# Patient Record
Sex: MALE | Race: WHITE | NOT HISPANIC OR LATINO | Employment: OTHER | URBAN - NONMETROPOLITAN AREA
[De-identification: names, ages, dates, MRNs, and addresses within clinical notes are randomized per-mention and may not be internally consistent; named-entity substitution may affect disease eponyms.]

---

## 2017-02-28 ENCOUNTER — TELEPHONE (OUTPATIENT)
Dept: CARDIOLOGY | Facility: CLINIC | Age: 72
End: 2017-02-28

## 2017-02-28 ENCOUNTER — OFFICE VISIT (OUTPATIENT)
Dept: CARDIOLOGY | Facility: CLINIC | Age: 72
End: 2017-02-28

## 2017-02-28 VITALS
BODY MASS INDEX: 31.35 KG/M2 | SYSTOLIC BLOOD PRESSURE: 120 MMHG | WEIGHT: 219 LBS | HEART RATE: 64 BPM | DIASTOLIC BLOOD PRESSURE: 68 MMHG | HEIGHT: 70 IN

## 2017-02-28 DIAGNOSIS — I10 ESSENTIAL HYPERTENSION: Primary | ICD-10-CM

## 2017-02-28 DIAGNOSIS — I25.10 CORONARY ARTERY DISEASE INVOLVING NATIVE CORONARY ARTERY OF NATIVE HEART WITHOUT ANGINA PECTORIS: Primary | ICD-10-CM

## 2017-02-28 DIAGNOSIS — E78.49 OTHER HYPERLIPIDEMIA: ICD-10-CM

## 2017-02-28 DIAGNOSIS — E03.8 OTHER SPECIFIED HYPOTHYROIDISM: ICD-10-CM

## 2017-02-28 DIAGNOSIS — I10 ESSENTIAL HYPERTENSION: ICD-10-CM

## 2017-02-28 DIAGNOSIS — I73.9 CLAUDICATION (HCC): ICD-10-CM

## 2017-02-28 PROBLEM — E03.9 HYPOTHYROID: Status: ACTIVE | Noted: 2017-02-28

## 2017-02-28 PROBLEM — E78.5 HYPERLIPEMIA: Status: ACTIVE | Noted: 2017-02-28

## 2017-02-28 PROCEDURE — 99214 OFFICE O/P EST MOD 30 MIN: CPT | Performed by: NURSE PRACTITIONER

## 2017-02-28 RX ORDER — ROSUVASTATIN CALCIUM 10 MG/1
10 TABLET, COATED ORAL DAILY
Qty: 90 TABLET | Refills: 2 | Status: SHIPPED | OUTPATIENT
Start: 2017-02-28 | End: 2017-08-31 | Stop reason: SDUPTHER

## 2017-02-28 RX ORDER — ROSUVASTATIN CALCIUM 10 MG/1
10 TABLET, COATED ORAL DAILY
Qty: 30 TABLET | Refills: 8 | Status: SHIPPED | OUTPATIENT
Start: 2017-02-28 | End: 2017-02-28 | Stop reason: SDUPTHER

## 2017-02-28 RX ORDER — ESOMEPRAZOLE MAGNESIUM 40 MG/1
40 CAPSULE, DELAYED RELEASE ORAL
COMMUNITY
End: 2017-08-31

## 2017-02-28 NOTE — PROGRESS NOTES
Chief Complaint   Patient presents with   • Follow-up     He reports has some sharp pain to right upper chest that comes and goes. He states has dizziness. Had labs obtained today.   • Shortness of Breath     He states all the time. He reports that he does not have PCP at this time.   • Med Refill     Needs 90 day refill on Rosvastatin.       Cardiac Complaints  chest pressure/discomfort, claudication and dyspnea      Subjective   Steve Santoyo is a 71 y.o. male diagnosed with ischemic heart disease in 2001. He underwent bypass surgery in 2003 and stenting in 2014. In the beginning of 2016, he started having some chest pain and dizziness. Cardiac workup showed ischemia involving the inferolateral wall and medication changes were made and the patient felt much better.  He returns today for follow up and reports he has chest discomfort that is improved but does still have some pain in the right side of his chest that comes and goes, he does not have to take any NTG and it is unrelated to exertion, denies left sided pain.  He also reports that he has shortness of breath but this is no worse than prior. Dizziness is present according to patient at times but he reports this as same as prior.  Lab order given today with more recommendations to follow.  Refills of crestor requested.        Cardiac History  Past Surgical History   Procedure Laterality Date   • Coronary artery bypass graft  12/18/2003     LIMA-LAD, SVG-OM.   • Cardiovascular stress test  09/20/2007     7 min, 70% THR, negative   • Echo - converted  09/20/2007     EF >60%, Mild to mod MR.   • Cardiovascular stress test  12/01/2008     7 min, 30 sec., 68% THR. R/O lateral ischemia   • Echo - converted  12/01/2008     EF>60%, Lateral WMA. RVSP- 42mmHg   • Cardiovascular stress test  04/12/2010     S. Echo- 7min, 30sec, 79% THR. Negative   • Us carotid unilateral  04/14/2011     no sig stenosis   • Other surgical history  04/14/2011     CT head- negative   •  Echo - converted  04/13/2012     EF 60-65%, aortic root 4.1cm   • Cardiovascular stress test  04/13/2012     L. Myoview- small apical ischemia   • Echo - converted  07/01/2015     EF 65%   • Cardiac catheterization  08/13/2001     100% LCX   • Cardiac catheterization  04/24/2002     100% LCX   • Cardiac catheterization  12/16/2003     50% LM, 70% LAD, 100% LCX   • Cardiac catheterization  06/24/2014     patent LIMA-LAD. 100% SVG-OM, 90% RCA-3.0x26 resolute   • Cardiovascular stress test  02/10/2016     L. Myoview- Inferolateral Ischemia.   • Echo - converted  02/10/2016     EF 60%       Current Outpatient Prescriptions   Medication Sig Dispense Refill   • aspirin 81 MG EC tablet Take 81 mg by mouth daily.     • esomeprazole (nexIUM) 40 MG capsule Take 40 mg by mouth Every Morning Before Breakfast.     • isosorbide mononitrate (IMDUR) 30 MG 24 hr tablet Take 1 tablet by mouth every morning. 90 tablet 3   • levothyroxine (SYNTHROID, LEVOTHROID) 75 MCG tablet Take 75 mcg by mouth daily.     • lisinopril (PRINIVIL,ZESTRIL) 10 MG tablet Take 1 tablet by mouth daily. 90 tablet 3   • meclizine (ANTIVERT) 25 MG tablet Take 25 mg by mouth 2 (Two) Times a Day.     • metoprolol tartrate (LOPRESSOR) 50 MG tablet Take 1 tablet by mouth 2 (two) times a day. 180 tablet 3   • rosuvastatin (CRESTOR) 10 MG tablet Take 1 tablet by mouth Daily. 90 tablet 2     No current facility-administered medications for this visit.        Review of patient's allergies indicates no known allergies.    Past Medical History   Diagnosis Date   • Arthritis    • GERD (gastroesophageal reflux disease)    • Hypercholesterolemia    • Hyperlipidemia    • Hypertension    • Hypothyroidism    • IHD (ischemic heart disease)    • PUD (peptic ulcer disease)        Social History     Social History   • Marital status:      Spouse name: N/A   • Number of children: N/A   • Years of education: N/A     Occupational History   • Not on file.     Social History  "Main Topics   • Smoking status: Former Smoker     Quit date: 1986   • Smokeless tobacco: Never Used   • Alcohol use No   • Drug use: No   • Sexual activity: Not on file     Other Topics Concern   • Not on file     Social History Narrative       Family History   Problem Relation Age of Onset   • Heart attack Mother    • Heart disease Mother      stent placement    • Heart attack Father      multiple   • Lung disease Father    • Heart attack Brother    • Heart disease Brother      CABG at age 42       Review of Systems   Constitutional: Negative.    HENT: Negative.    Respiratory: Positive for shortness of breath.    Cardiovascular: Positive for chest pain.   Gastrointestinal: Negative.    Endocrine: Negative.    Skin: Negative.    Neurological: Negative.    Psychiatric/Behavioral: Negative.        DiabetesNo  Thyroidnormal    Objective     Visit Vitals   • /68 (BP Location: Right arm)   • Pulse 64   • Ht 70\" (177.8 cm)   • Wt 219 lb (99.3 kg)   • BMI 31.42 kg/m2       Physical Exam   Constitutional: He is oriented to person, place, and time. He appears well-developed and well-nourished.   HENT:   Head: Normocephalic and atraumatic.   Eyes: EOM are normal. Pupils are equal, round, and reactive to light.   Neck: Normal range of motion. Neck supple.   Cardiovascular: Normal rate and regular rhythm.    Murmur heard.  Pulmonary/Chest: Effort normal and breath sounds normal.   Abdominal: Soft.   Musculoskeletal: Normal range of motion.   Neurological: He is alert and oriented to person, place, and time.   Skin: Skin is warm and dry.   Psychiatric: He has a normal mood and affect.       Procedures    Assessment/Plan     HR and BP are both stable.  No changes in meds will be made.  Since he remains short of breath, we did discuss cardiac cath since last stress testing was positive but he feels like it is no worse than before, he would like to wait on that.  Labs were done today but no copy available yet.  Refills of " crestor sent.  BISHNU recommended to look for any stenosis that may be present since patient reports a great deal of claudication pain with walking, more recommendations to follow. Good cardiac diet and activity as tolerated advised.  6 month follow up advised or sooner if needed.      Problems Addressed this Visit        Cardiovascular and Mediastinum    Coronary artery disease involving native coronary artery of native heart without angina pectoris - Primary    HTN (hypertension)    Hyperlipemia    Relevant Medications    rosuvastatin (CRESTOR) 10 MG tablet       Endocrine    Hypothyroid      Other Visit Diagnoses     Claudication        Relevant Orders    US Ankle / Brachial Indices Extremity Complete              Electronically signed by MARTHA Adames February 28, 2017 4:16 PM

## 2017-02-28 NOTE — TELEPHONE ENCOUNTER
Received call from patient reports has appt. Today and is wanting to know if you would like to order labs would like to get those done prior to appt. At [facility] lab, patient reports has not had labs done for quiet a while. Do you want to order labs for patient? Thank you.

## 2017-03-02 ENCOUNTER — TELEPHONE (OUTPATIENT)
Dept: CARDIOLOGY | Facility: CLINIC | Age: 72
End: 2017-03-02

## 2017-03-02 DIAGNOSIS — E87.5 HYPERKALEMIA: Primary | ICD-10-CM

## 2017-03-13 ENCOUNTER — TELEPHONE (OUTPATIENT)
Dept: CARDIOLOGY | Facility: CLINIC | Age: 72
End: 2017-03-13

## 2017-03-13 RX ORDER — SILDENAFIL CITRATE 100 MG
TABLET ORAL
Qty: 10 TABLET | Refills: 0 | Status: SHIPPED | OUTPATIENT
Start: 2017-03-13 | End: 2018-09-10

## 2017-03-13 NOTE — TELEPHONE ENCOUNTER
Patient returned instructed patient not to take viagara with Imdur, patient verbalizes understanding of instructions.

## 2017-03-13 NOTE — TELEPHONE ENCOUNTER
Received refill request from pharmacy for sildenafil Citrate  and is not on patient's medication list at last visit, is it okay to fill? Thank you.

## 2017-03-14 ENCOUNTER — TELEPHONE (OUTPATIENT)
Dept: CARDIOLOGY | Facility: CLINIC | Age: 72
End: 2017-03-14

## 2017-03-14 ENCOUNTER — DOCUMENTATION (OUTPATIENT)
Dept: CARDIOLOGY | Facility: CLINIC | Age: 72
End: 2017-03-14

## 2017-03-14 NOTE — TELEPHONE ENCOUNTER
"Patient aware that Viagra is not safe to take with Imdur. I explained to him that Sylwia said she could change the Imdur to Ranexa but she would not prescribe Viagra otherwise. He said that he has been taking Viagra while on the Imdur. I stressed the risk of the two medications together and he said \"Well, we can talk about this later\".  I got a sense he was avoiding the issue. So PA for Viagra or Cialis is discontinued.  "

## 2017-03-14 NOTE — TELEPHONE ENCOUNTER
Viagra needing PA. His insurance is suggesting Cialis which is on the formulary. Do you want to change? Dose/directions please.

## 2017-03-14 NOTE — PROGRESS NOTES
PA for Viagra completed and [facility] to [facility] 03/14/2017  Changing to Cialis

## 2017-03-14 NOTE — TELEPHONE ENCOUNTER
Called [facility] pharmacy and cancelled script for Viagara 100mg due to patient reports is taking Imdur.

## 2017-05-08 RX ORDER — ESOMEPRAZOLE MAGNESIUM 40 MG/1
CAPSULE, DELAYED RELEASE ORAL
Qty: 90 CAPSULE | Refills: 3 | OUTPATIENT
Start: 2017-05-08

## 2017-08-31 ENCOUNTER — OFFICE VISIT (OUTPATIENT)
Dept: CARDIOLOGY | Facility: CLINIC | Age: 72
End: 2017-08-31

## 2017-08-31 VITALS
HEIGHT: 70 IN | HEART RATE: 60 BPM | DIASTOLIC BLOOD PRESSURE: 94 MMHG | BODY MASS INDEX: 30.06 KG/M2 | WEIGHT: 210 LBS | SYSTOLIC BLOOD PRESSURE: 150 MMHG

## 2017-08-31 DIAGNOSIS — I10 ESSENTIAL HYPERTENSION: ICD-10-CM

## 2017-08-31 DIAGNOSIS — I25.10 CORONARY ARTERY DISEASE INVOLVING NATIVE CORONARY ARTERY OF NATIVE HEART WITHOUT ANGINA PECTORIS: Primary | ICD-10-CM

## 2017-08-31 PROCEDURE — 99214 OFFICE O/P EST MOD 30 MIN: CPT | Performed by: NURSE PRACTITIONER

## 2017-08-31 RX ORDER — ROSUVASTATIN CALCIUM 10 MG/1
10 TABLET, COATED ORAL DAILY
Qty: 90 TABLET | Refills: 3 | Status: SHIPPED | OUTPATIENT
Start: 2017-08-31 | End: 2018-09-07 | Stop reason: SDUPTHER

## 2017-08-31 RX ORDER — ISOSORBIDE MONONITRATE 60 MG/1
60 TABLET, EXTENDED RELEASE ORAL EVERY MORNING
Qty: 90 TABLET | Refills: 3 | Status: SHIPPED | OUTPATIENT
Start: 2017-08-31 | End: 2018-09-06 | Stop reason: ALTCHOICE

## 2017-08-31 RX ORDER — RANITIDINE 150 MG/1
150 CAPSULE ORAL 2 TIMES DAILY
Qty: 60 CAPSULE | Refills: 3 | Status: SHIPPED | OUTPATIENT
Start: 2017-08-31 | End: 2017-08-31 | Stop reason: SDUPTHER

## 2017-08-31 RX ORDER — LISINOPRIL 10 MG/1
10 TABLET ORAL DAILY
Qty: 90 TABLET | Refills: 3 | Status: SHIPPED | OUTPATIENT
Start: 2017-08-31 | End: 2018-03-07 | Stop reason: DRUGHIGH

## 2017-08-31 RX ORDER — RANITIDINE 150 MG/1
150 CAPSULE ORAL 2 TIMES DAILY
Qty: 60 CAPSULE | Refills: 6 | Status: SHIPPED | OUTPATIENT
Start: 2017-08-31 | End: 2018-03-05 | Stop reason: ALTCHOICE

## 2017-08-31 RX ORDER — METOPROLOL TARTRATE 50 MG/1
50 TABLET, FILM COATED ORAL 2 TIMES DAILY
Qty: 180 TABLET | Refills: 3 | Status: SHIPPED | OUTPATIENT
Start: 2017-08-31 | End: 2018-09-07 | Stop reason: SDUPTHER

## 2017-09-01 RX ORDER — NITROGLYCERIN 0.4 MG/1
TABLET SUBLINGUAL
Qty: 25 TABLET | Refills: 1 | Status: SHIPPED | OUTPATIENT
Start: 2017-09-01

## 2017-10-09 DIAGNOSIS — I10 ESSENTIAL HYPERTENSION: ICD-10-CM

## 2017-10-09 DIAGNOSIS — I25.10 CORONARY ARTERY DISEASE INVOLVING NATIVE CORONARY ARTERY OF NATIVE HEART WITHOUT ANGINA PECTORIS: ICD-10-CM

## 2017-10-09 RX ORDER — METOPROLOL TARTRATE 50 MG/1
TABLET, FILM COATED ORAL
Qty: 180 TABLET | Refills: 3 | Status: SHIPPED | OUTPATIENT
Start: 2017-10-09 | End: 2018-03-05 | Stop reason: SDUPTHER

## 2017-10-24 DIAGNOSIS — I25.10 CORONARY ARTERY DISEASE INVOLVING NATIVE CORONARY ARTERY OF NATIVE HEART WITHOUT ANGINA PECTORIS: ICD-10-CM

## 2017-10-24 RX ORDER — ISOSORBIDE MONONITRATE 30 MG/1
TABLET, EXTENDED RELEASE ORAL
Qty: 90 TABLET | Refills: 3 | OUTPATIENT
Start: 2017-10-24

## 2018-03-05 ENCOUNTER — OFFICE VISIT (OUTPATIENT)
Dept: CARDIOLOGY | Facility: CLINIC | Age: 73
End: 2018-03-05

## 2018-03-05 VITALS
SYSTOLIC BLOOD PRESSURE: 140 MMHG | BODY MASS INDEX: 30.64 KG/M2 | WEIGHT: 214 LBS | HEIGHT: 70 IN | HEART RATE: 64 BPM | DIASTOLIC BLOOD PRESSURE: 90 MMHG

## 2018-03-05 DIAGNOSIS — I10 ESSENTIAL HYPERTENSION: Primary | ICD-10-CM

## 2018-03-05 DIAGNOSIS — I25.10 CORONARY ARTERY DISEASE INVOLVING NATIVE CORONARY ARTERY OF NATIVE HEART WITHOUT ANGINA PECTORIS: ICD-10-CM

## 2018-03-05 DIAGNOSIS — E78.00 PURE HYPERCHOLESTEROLEMIA: ICD-10-CM

## 2018-03-05 DIAGNOSIS — E66.9 OBESITY (BMI 30-39.9): ICD-10-CM

## 2018-03-05 DIAGNOSIS — R39.15 URINARY URGENCY: ICD-10-CM

## 2018-03-05 DIAGNOSIS — E03.9 ACQUIRED HYPOTHYROIDISM: ICD-10-CM

## 2018-03-05 DIAGNOSIS — R39.11 URINARY HESITANCY: ICD-10-CM

## 2018-03-05 PROCEDURE — 99214 OFFICE O/P EST MOD 30 MIN: CPT | Performed by: NURSE PRACTITIONER

## 2018-03-05 RX ORDER — LEVOTHYROXINE SODIUM 0.1 MG/1
100 TABLET ORAL DAILY
COMMUNITY
End: 2018-09-06 | Stop reason: DRUGHIGH

## 2018-03-05 RX ORDER — ESOMEPRAZOLE MAGNESIUM 40 MG/1
40 CAPSULE, DELAYED RELEASE ORAL
COMMUNITY
End: 2019-06-20 | Stop reason: SDUPTHER

## 2018-03-05 NOTE — PROGRESS NOTES
Chief Complaint   Patient presents with   • Follow-up     For cardiac management. Would like to talk about getting some Viagara. Reports that he had some chest pains after he had the flu, but says they have gone away and feels better than he has in a long. Reports he always has shortness of breath. Last lab work was done when he was in [facility] ER for the flu, will attempt to obtain, in January. Reports he is dizzy a lot.   • Med Refill     Does not need refills at this time.       Cardiac Complaints  none      Subjective   Steve Santoyo is a 72 y.o. male with hypertension, hypothyroidism, hyperlipidemia, and  ischemic heart disease diagnosed in 2001. He underwent bypass surgery in 2003 and stenting in 2014. In the beginning of 2016, he started having some chest pain and dizziness. Cardiac workup showed ischemia involving the inferolateral wall and medication changes were made and the patient felt much better. He returns today for follow up and says from a cardiac standpoint he feels great.  Chest pain, shortness of breath, palpitations, and dizziness.  He does state he had some chest pain after the flu but this has since subsided and he has done better.  He does have shortness of breath but this is unchanged from prior.  His biggest problem today is urinary frequency/urgency, which he has been having problems with lately.  He does have some questions about possibly adding viagra to current routine.  No recent labs have been done. No refills are needed.      Cardiac History  Past Surgical History:   Procedure Laterality Date   • CARDIAC CATHETERIZATION  08/13/2001    100% LCX   • CARDIAC CATHETERIZATION  04/24/2002    100% LCX   • CARDIAC CATHETERIZATION  12/16/2003    50% LM, 70% LAD, 100% LCX   • CARDIAC CATHETERIZATION  06/24/2014    patent LIMA-LAD. 100% SVG-OM, 90% RCA-3.0x26 resolute   • CARDIOVASCULAR STRESS TEST  09/20/2007    7 min, 70% THR, negative   • CARDIOVASCULAR STRESS TEST  12/01/2008    7 min, 30  sec., 68% THR. R/O lateral ischemia   • CARDIOVASCULAR STRESS TEST  04/12/2010    S. Echo- 7min, 30sec, 79% THR. Negative   • CARDIOVASCULAR STRESS TEST  04/13/2012    L. Myoview- small apical ischemia   • CARDIOVASCULAR STRESS TEST  02/10/2016    L. Myoview- Inferolateral Ischemia.   • CORONARY ARTERY BYPASS GRAFT  12/18/2003    LIMA-LAD, SVG-OM.   • ECHO - CONVERTED  09/20/2007    EF >60%, Mild to mod MR.   • ECHO - CONVERTED  12/01/2008    EF>60%, Lateral WMA. RVSP- 42mmHg   • ECHO - CONVERTED  04/13/2012    EF 60-65%, aortic root 4.1cm   • ECHO - CONVERTED  07/01/2015    EF 65%   • ECHO - CONVERTED  02/10/2016    EF 60%   • OTHER SURGICAL HISTORY  04/14/2011    CT head- negative   • OTHER SURGICAL HISTORY  03/06/2017    BISHNU- no hemodynamically significant stenosis noted either side   • US CAROTID UNILATERAL  04/14/2011    no sig stenosis       Current Outpatient Prescriptions   Medication Sig Dispense Refill   • aspirin 81 MG EC tablet Take 81 mg by mouth daily.     • esomeprazole (nexIUM) 40 MG capsule Take 40 mg by mouth Every Morning Before Breakfast.     • isosorbide mononitrate (IMDUR) 60 MG 24 hr tablet Take 1 tablet by mouth Every Morning. 90 tablet 3   • levothyroxine (SYNTHROID, LEVOTHROID) 100 MCG tablet Take 100 mcg by mouth Daily.     • lisinopril (PRINIVIL,ZESTRIL) 10 MG tablet Take 1 tablet by mouth Daily. 90 tablet 3   • meclizine (ANTIVERT) 25 MG tablet Take 25 mg by mouth 2 (Two) Times a Day.     • metoprolol tartrate (LOPRESSOR) 50 MG tablet Take 1 tablet by mouth 2 (Two) Times a Day. 180 tablet 3   • nitroglycerin (NITROSTAT) 0.4 MG SL tablet 1 under the tongue as needed for angina, may repeat q5mins for up three doses 25 tablet 1   • rosuvastatin (CRESTOR) 10 MG tablet Take 1 tablet by mouth Daily. 90 tablet 3   • VIAGRA 100 MG tablet TAKE 1 TABLET BY MOUTH DAILY 10 tablet 0     No current facility-administered medications for this visit.        Review of patient's allergies indicates no  "known allergies.    Past Medical History:   Diagnosis Date   • Arthritis    • GERD (gastroesophageal reflux disease)    • Hypercholesterolemia    • Hyperlipidemia    • Hypertension    • Hypothyroidism    • IHD (ischemic heart disease)    • PUD (peptic ulcer disease)        Social History     Social History   • Marital status:      Spouse name: N/A   • Number of children: N/A   • Years of education: N/A     Occupational History   • Not on file.     Social History Main Topics   • Smoking status: Former Smoker     Quit date: 1986   • Smokeless tobacco: Never Used   • Alcohol use No   • Drug use: No   • Sexual activity: Not on file     Other Topics Concern   • Not on file     Social History Narrative       Family History   Problem Relation Age of Onset   • Heart attack Mother    • Heart disease Mother      stent placement    • Heart attack Father      multiple   • Lung disease Father    • Heart attack Brother    • Heart disease Brother      CABG at age 42       Review of Systems   Constitution: Negative for weakness and malaise/fatigue.   Cardiovascular: Negative for chest pain, dyspnea on exertion, irregular heartbeat, leg swelling, near-syncope, palpitations and syncope.   Respiratory: Negative for cough, shortness of breath and wheezing.    Musculoskeletal: Negative for falls, joint pain and muscle weakness.   Gastrointestinal: Negative for anorexia, heartburn and nausea.   Genitourinary: Positive for frequency, hesitancy and nocturia. Negative for dysuria.   Neurological: Negative for dizziness, light-headedness and loss of balance.   Psychiatric/Behavioral: Negative for depression and memory loss. The patient is not nervous/anxious.        DiabetesNo  Thyroidnormal    Objective     /90 (BP Location: Left arm)  Pulse 64  Ht 177.8 cm (70\")  Wt 97.1 kg (214 lb)  BMI 30.71 kg/m2    Physical Exam   Constitutional: He is oriented to person, place, and time. He appears well-developed and well-nourished. "   HENT:   Head: Normocephalic and atraumatic.   Eyes: EOM are normal. Pupils are equal, round, and reactive to light.   Neck: Normal range of motion.   Cardiovascular: Normal rate and regular rhythm.    Murmur heard.  Pulmonary/Chest: Effort normal and breath sounds normal.   Abdominal: Soft.   Musculoskeletal: Normal range of motion.   Neurological: He is alert and oriented to person, place, and time.   Skin: Skin is warm and dry.   Psychiatric: He has a normal mood and affect.       Procedures    Assessment/Plan     HR is stable. BP is slightly elevated at 140/90 but patient reports at home well controlled.  He was encouraged to keep an eye on blood pressure at home, DASH diet encouraged with limited sodium advised. He continues on current for hypertension management.   He was advised to continue on imdur therapy for CAD and most recent stress showing possible inferior ischemia.  He was encouraged he can not take the viagra with current imdur therapy.  For hypothyroidism, he continues on synthroid therapy no recent TSH has been done.  For hyperlipidemia management, he continues on crestor without problems. Lab order provided with more recommendations to follow.  No refills needed.  No new cardiac workup encouraged as no new symptoms are voiced.  BMI elevated at 30, limited caloric intake and carbohydrates encouraged.  6 month follow up advised or sooner if needed.      Problems Addressed this Visit        Cardiovascular and Mediastinum    Coronary artery disease involving native coronary artery of native heart without angina pectoris    Relevant Orders    CBC & Differential    Comprehensive Metabolic Panel    Lipid Panel    TSH    HTN (hypertension) - Primary    Relevant Orders    CBC & Differential    Comprehensive Metabolic Panel    Lipid Panel    TSH    Hyperlipemia    Relevant Orders    CBC & Differential    Comprehensive Metabolic Panel    Lipid Panel    TSH       Endocrine    Hypothyroid    Relevant  Medications    levothyroxine (SYNTHROID, LEVOTHROID) 100 MCG tablet    Other Relevant Orders    TSH      Other Visit Diagnoses     Urinary urgency        Relevant Orders    CBC & Differential    Comprehensive Metabolic Panel    Lipid Panel    TSH    PSA DIAGNOSTIC    Urinary hesitancy        Relevant Orders    CBC & Differential    Comprehensive Metabolic Panel    Lipid Panel    TSH    PSA DIAGNOSTIC          Patient's BMI is above normal parameters. Follow-up plan includes:  nutrition counseling.        Electronically signed by MARTHA Adames March 5, 2018 4:24 PM

## 2018-03-07 ENCOUNTER — TELEPHONE (OUTPATIENT)
Dept: CARDIOLOGY | Facility: CLINIC | Age: 73
End: 2018-03-07

## 2018-03-07 RX ORDER — AMLODIPINE BESYLATE 2.5 MG/1
2.5 TABLET ORAL DAILY
Qty: 90 TABLET | Refills: 3 | Status: SHIPPED | OUTPATIENT
Start: 2018-03-07 | End: 2018-09-07 | Stop reason: SDUPTHER

## 2018-03-07 RX ORDER — LISINOPRIL 5 MG/1
5 TABLET ORAL DAILY
Qty: 90 TABLET | Refills: 3 | Status: SHIPPED | OUTPATIENT
Start: 2018-03-07 | End: 2018-09-06 | Stop reason: ALTCHOICE

## 2018-03-07 RX ORDER — EZETIMIBE 10 MG/1
10 TABLET ORAL DAILY
Qty: 90 TABLET | Refills: 3 | Status: SHIPPED | OUTPATIENT
Start: 2018-03-07 | End: 2018-09-07 | Stop reason: SDUPTHER

## 2018-03-07 NOTE — TELEPHONE ENCOUNTER
----- Message from MARTHA Landis sent at 3/7/2018 10:14 AM EST -----  Please tell him cholesterol is high.  Please add zetia.  Also, his kidney function is declined.  Decrease his lisinopril to 5 and add norvasc 2.5

## 2018-03-07 NOTE — TELEPHONE ENCOUNTER
Patient aware of results of lab work. He was explained that due to cholesterol he needed to add zetia and explained the need to decrease lisinopril to 5 mg and to add amlodipine 2.5 mg. He verbalized understanding. He asked for 90 day supplys to be sent in to pharmacy.     Script for zetia 10 mg QD, lisinopril 5 mg QD, and amlodipine 2.5 mg QD sent to [facility] in [place] with 90 day tablets and 3 refills. [facility] in [place] made aware to discontinue script for lisinopril 10 mg QD.

## 2018-04-16 ENCOUNTER — TELEPHONE (OUTPATIENT)
Dept: CARDIOLOGY | Facility: CLINIC | Age: 73
End: 2018-04-16

## 2018-04-16 NOTE — TELEPHONE ENCOUNTER
Patient called requesting refill on Levothyroxine, advised patient to contact PCP for refills due to noncardiac medication, patient verbalized understanding.

## 2018-09-06 ENCOUNTER — OFFICE VISIT (OUTPATIENT)
Dept: CARDIOLOGY | Facility: CLINIC | Age: 73
End: 2018-09-06

## 2018-09-06 VITALS
HEART RATE: 64 BPM | WEIGHT: 209 LBS | DIASTOLIC BLOOD PRESSURE: 80 MMHG | SYSTOLIC BLOOD PRESSURE: 128 MMHG | HEIGHT: 70 IN | BODY MASS INDEX: 29.92 KG/M2

## 2018-09-06 DIAGNOSIS — I25.10 CORONARY ARTERY DISEASE INVOLVING NATIVE CORONARY ARTERY OF NATIVE HEART WITHOUT ANGINA PECTORIS: Primary | ICD-10-CM

## 2018-09-06 DIAGNOSIS — E03.8 OTHER SPECIFIED HYPOTHYROIDISM: ICD-10-CM

## 2018-09-06 DIAGNOSIS — I10 ESSENTIAL HYPERTENSION: ICD-10-CM

## 2018-09-06 DIAGNOSIS — E78.49 OTHER HYPERLIPIDEMIA: ICD-10-CM

## 2018-09-06 PROCEDURE — 99213 OFFICE O/P EST LOW 20 MIN: CPT | Performed by: NURSE PRACTITIONER

## 2018-09-06 RX ORDER — LEVOTHYROXINE SODIUM 0.07 MG/1
75 TABLET ORAL DAILY
COMMUNITY
End: 2019-06-20 | Stop reason: SDUPTHER

## 2018-09-06 RX ORDER — ISOSORBIDE MONONITRATE 30 MG/1
30 TABLET, EXTENDED RELEASE ORAL DAILY
COMMUNITY
End: 2018-09-07

## 2018-09-06 NOTE — PROGRESS NOTES
Chief Complaint   Patient presents with   • Follow-up     For cardiac management. Patient is on aspirin. Reports that he has not had viagra due to insruance not paying. Imdur is 30 mg. Reports that he does have shortness of breath. Reports that he is always dizzy. Last lab work was done in May 2018 per PCP, not in chart, lisinopril was stopped at that time.    • Med Refill     Needs refills on cardiac medications. 90 day supplys to [facility].       Subjective       Steve Santoyo is a 73 y.o. male with hypertension, hypothyroidism, hyperlipidemia, and IHD s/p bypass surgery in 2003 followed by stenting in 2014. He developed more chest pain in 2016, and stress test showed ischemia of the inferolateral wall.  Imdur was added and his symptoms resolved. He has been chest pain free since then. He came in today for follow up. He feels well. Denies CP, SOB, palpitations, dizziness. He returns today for follow up and says from a cardiac standpoint he feels great.  Chest pain, shortness of breath, palpitations,.  He does have dizziness which is chronic and unchanged. He has some questions about Viagra and wants to know if safe to take. Labs on 3/7/18: BUN/Cr 26/1.4, K 5.2, , Tri 102, HDL 43, . TSH 1.85. Zetia added for better LDL control. Lisinopril decreased to 5 mg and amlodipine 2.5 mg added. He did follow up with PCP and labs rechecked in May and lisinopril stopped.     HPI         Cardiac History:    Past Surgical History:   Procedure Laterality Date   • CARDIAC CATHETERIZATION  08/13/2001    100% LCX   • CARDIAC CATHETERIZATION  04/24/2002    100% LCX   • CARDIAC CATHETERIZATION  12/16/2003    50% LM, 70% LAD, 100% LCX   • CARDIAC CATHETERIZATION  06/24/2014    patent LIMA-LAD. 100% SVG-OM, 90% RCA-3.0x26 resolute   • CARDIOVASCULAR STRESS TEST  09/20/2007    7 min, 70% THR, negative   • CARDIOVASCULAR STRESS TEST  12/01/2008    7 min, 30 sec., 68% THR. R/O lateral ischemia   • CARDIOVASCULAR STRESS  TEST  04/12/2010    S. Echo- 7min, 30sec, 79% THR. Negative   • CARDIOVASCULAR STRESS TEST  04/13/2012    L. Myoview- small apical ischemia   • CARDIOVASCULAR STRESS TEST  02/10/2016    L. Myoview- Inferolateral Ischemia.   • CORONARY ARTERY BYPASS GRAFT  12/18/2003    LIMA-LAD, SVG-OM.   • ECHO - CONVERTED  09/20/2007    EF >60%, Mild to mod MR.   • ECHO - CONVERTED  12/01/2008    EF>60%, Lateral WMA. RVSP- 42mmHg   • ECHO - CONVERTED  04/13/2012    EF 60-65%, aortic root 4.1cm   • ECHO - CONVERTED  07/01/2015    EF 65%   • ECHO - CONVERTED  02/10/2016    EF 60%   • OTHER SURGICAL HISTORY  04/14/2011    CT head- negative   • OTHER SURGICAL HISTORY  03/06/2017    BISHNU- no hemodynamically significant stenosis noted either side   • US CAROTID UNILATERAL  04/14/2011    no sig stenosis       Current Outpatient Prescriptions   Medication Sig Dispense Refill   • amLODIPine (NORVASC) 2.5 MG tablet Take 1 tablet by mouth Daily. 90 tablet 3   • aspirin 81 MG EC tablet Take 81 mg by mouth daily.     • esomeprazole (nexIUM) 40 MG capsule Take 40 mg by mouth Every Morning Before Breakfast.     • ezetimibe (ZETIA) 10 MG tablet Take 1 tablet by mouth Daily. 90 tablet 3   • levothyroxine (SYNTHROID, LEVOTHROID) 75 MCG tablet Take 75 mcg by mouth Daily.     • meclizine (ANTIVERT) 25 MG tablet Take 25 mg by mouth 2 (Two) Times a Day.     • metoprolol tartrate (LOPRESSOR) 50 MG tablet Take 1 tablet by mouth Every 12 (Twelve) Hours. 180 tablet 3   • nitroglycerin (NITROSTAT) 0.4 MG SL tablet 1 under the tongue as needed for angina, may repeat q5mins for up three doses 25 tablet 1   • rosuvastatin (CRESTOR) 10 MG tablet Take 1 tablet by mouth Daily. 90 tablet 3   • VIAGRA 100 MG tablet TAKE 1 TABLET BY MOUTH DAILY 10 tablet 0     No current facility-administered medications for this visit.        Patient has no known allergies.    Past Medical History:   Diagnosis Date   • Arthritis    • GERD (gastroesophageal reflux disease)    •  "Hypercholesterolemia    • Hyperlipidemia    • Hypertension    • Hypothyroidism    • IHD (ischemic heart disease)    • PUD (peptic ulcer disease)        Social History     Social History   • Marital status:      Spouse name: N/A   • Number of children: N/A   • Years of education: N/A     Occupational History   • Not on file.     Social History Main Topics   • Smoking status: Former Smoker     Quit date: 1986   • Smokeless tobacco: Never Used   • Alcohol use No   • Drug use: No   • Sexual activity: Not on file     Other Topics Concern   • Not on file     Social History Narrative   • No narrative on file       Family History   Problem Relation Age of Onset   • Heart attack Mother    • Heart disease Mother         stent placement    • Heart attack Father         multiple   • Lung disease Father    • Heart attack Brother    • Heart disease Brother         CABG at age 42       Review of Systems   Constitution: Negative for decreased appetite, weakness and malaise/fatigue.   HENT: Negative.    Eyes: Negative.    Cardiovascular: Negative for chest pain, dyspnea on exertion, leg swelling, near-syncope, orthopnea, palpitations and syncope.   Respiratory: Negative for cough and shortness of breath.    Endocrine: Negative.    Hematologic/Lymphatic: Negative.    Musculoskeletal: Positive for arthritis and joint pain. Negative for myalgias.   Gastrointestinal: Negative for abdominal pain and melena.   Genitourinary: Negative for dysuria and hematuria.   Neurological: Positive for dizziness.   Psychiatric/Behavioral: Negative for altered mental status and depression.   Allergic/Immunologic: Negative.         Diabetes- No  Thyroid-normal    Objective     /80 (BP Location: Left arm)   Pulse 64   Ht 177.8 cm (70\")   Wt 94.8 kg (209 lb)   BMI 29.99 kg/m²     Physical Exam   Constitutional: He is oriented to person, place, and time. He appears well-developed and well-nourished.   HENT:   Head: Normocephalic and " atraumatic.   Eyes: Pupils are equal, round, and reactive to light.   Neck: Normal range of motion.   Cardiovascular: Normal rate, regular rhythm and intact distal pulses.    Pulmonary/Chest: Effort normal and breath sounds normal. No respiratory distress. He has no wheezes.   Abdominal: Soft. Bowel sounds are normal.   Musculoskeletal: Normal range of motion. He exhibits no edema.   Neurological: He is alert and oriented to person, place, and time.   Skin: Skin is warm and dry.   Psychiatric: He has a normal mood and affect.   Nursing note and vitals reviewed.     Procedures          Assessment/Plan    Heart rate stable. Blood pressure well controlled without lisinopril. We reviewed findings of most recent stress showing inferolateral changes. He remains asymptomatic,therefore medical management continued. He is advised to avoid using long-acting nitrates with PDE5 inhibitors. Discussed with Dr. Tracy. He can discontinue long-acting nitrates since he is asymptomatic. Use Viagra PRN. If he develops chest pain, he can use sl nitro or we can try adding Ranexa for angina. He was given an order to recheck lipids after starting Zetia as well as CMP, CBC, and TSH. Copy will be forwarded to you. BMI is elevated. Recommend walking as tolerated. He does have joint pain which limits his ability to exercise. Heart healthy diet, limiting sodium intake to less than 1,500 mg daily recommended. He appears stable. We will see him back in six months or sooner if needed.   Steve was seen today for follow-up and med refill.    Diagnoses and all orders for this visit:    Coronary artery disease involving native coronary artery of native heart without angina pectoris  -     CBC (No Diff); Future  -     Comprehensive Metabolic Panel; Future  -     Lipid Panel; Future  -     TSH; Future  -     metoprolol tartrate (LOPRESSOR) 50 MG tablet; Take 1 tablet by mouth Every 12 (Twelve) Hours.    Essential hypertension  -     CBC (No Diff);  Future  -     Comprehensive Metabolic Panel; Future  -     Lipid Panel; Future  -     TSH; Future  -     metoprolol tartrate (LOPRESSOR) 50 MG tablet; Take 1 tablet by mouth Every 12 (Twelve) Hours.    Other hyperlipidemia  -     CBC (No Diff); Future  -     Comprehensive Metabolic Panel; Future  -     Lipid Panel; Future  -     TSH; Future    Other specified hypothyroidism  -     CBC (No Diff); Future  -     Comprehensive Metabolic Panel; Future  -     Lipid Panel; Future  -     TSH; Future    Other orders  -     amLODIPine (NORVASC) 2.5 MG tablet; Take 1 tablet by mouth Daily.  -     rosuvastatin (CRESTOR) 10 MG tablet; Take 1 tablet by mouth Daily.  -     ezetimibe (ZETIA) 10 MG tablet; Take 1 tablet by mouth Daily.        Patient's Body mass index is 29.99 kg/m². BMI is above normal parameters. Recommendations include: nutrition counseling.                 Electronically signed by MARTHA Blackburn,  September 7, 2018 4:40 PM

## 2018-09-07 ENCOUNTER — TELEPHONE (OUTPATIENT)
Dept: CARDIOLOGY | Facility: CLINIC | Age: 73
End: 2018-09-07

## 2018-09-07 RX ORDER — AMLODIPINE BESYLATE 2.5 MG/1
2.5 TABLET ORAL DAILY
Qty: 90 TABLET | Refills: 3 | Status: SHIPPED | OUTPATIENT
Start: 2018-09-07 | End: 2019-06-20 | Stop reason: SDUPTHER

## 2018-09-07 RX ORDER — EZETIMIBE 10 MG/1
10 TABLET ORAL DAILY
Qty: 90 TABLET | Refills: 3 | Status: SHIPPED | OUTPATIENT
Start: 2018-09-07 | End: 2019-06-20 | Stop reason: SDUPTHER

## 2018-09-07 RX ORDER — METOPROLOL TARTRATE 50 MG/1
50 TABLET, FILM COATED ORAL EVERY 12 HOURS SCHEDULED
Qty: 180 TABLET | Refills: 3 | Status: SHIPPED | OUTPATIENT
Start: 2018-09-07 | End: 2019-06-20 | Stop reason: SDUPTHER

## 2018-09-07 RX ORDER — ROSUVASTATIN CALCIUM 10 MG/1
10 TABLET, COATED ORAL DAILY
Qty: 90 TABLET | Refills: 3 | Status: SHIPPED | OUTPATIENT
Start: 2018-09-07 | End: 2019-06-20 | Stop reason: SDUPTHER

## 2018-09-07 NOTE — TELEPHONE ENCOUNTER
Can we let Mr. Santoyo know that Dr. Tracy agreed to Viagra 50 mg PRN. He will need to discontinue isosorbide. If he develops CP, can use sl nitro or we can add Ranexa if CP is persistent.     Let me know if he agrees, and I will call in to pharmacy.     Thanks

## 2018-09-10 RX ORDER — SILDENAFIL 50 MG/1
50 TABLET, FILM COATED ORAL DAILY PRN
Qty: 30 TABLET | Refills: 8 | Status: SHIPPED | OUTPATIENT
Start: 2018-09-10 | End: 2019-06-20 | Stop reason: SDUPTHER

## 2018-09-10 NOTE — TELEPHONE ENCOUNTER
Attempted to notify patient of recommendations, unable to reach, left message for patient to call back.

## 2018-09-10 NOTE — TELEPHONE ENCOUNTER
Patient made aware of recommendations, patient wishes to proceed with Viagra 50mg prn and he will discontinue Isosorbide, will call if has persistent chest pain.

## 2018-11-01 RX ORDER — ISOSORBIDE MONONITRATE 60 MG/1
60 TABLET, EXTENDED RELEASE ORAL EVERY MORNING
Qty: 90 TABLET | Refills: 3 | OUTPATIENT
Start: 2018-11-01

## 2018-11-12 RX ORDER — ROSUVASTATIN CALCIUM 10 MG/1
10 TABLET, COATED ORAL DAILY
Qty: 90 TABLET | Refills: 2 | Status: SHIPPED | OUTPATIENT
Start: 2018-11-12 | End: 2019-06-20 | Stop reason: SDUPTHER

## 2019-03-07 ENCOUNTER — OFFICE VISIT (OUTPATIENT)
Dept: CARDIOLOGY | Facility: CLINIC | Age: 74
End: 2019-03-07

## 2019-03-07 VITALS
HEIGHT: 70 IN | WEIGHT: 221 LBS | SYSTOLIC BLOOD PRESSURE: 144 MMHG | DIASTOLIC BLOOD PRESSURE: 80 MMHG | BODY MASS INDEX: 31.64 KG/M2 | HEART RATE: 64 BPM

## 2019-03-07 DIAGNOSIS — I10 ESSENTIAL HYPERTENSION: ICD-10-CM

## 2019-03-07 DIAGNOSIS — E03.9 ACQUIRED HYPOTHYROIDISM: ICD-10-CM

## 2019-03-07 DIAGNOSIS — I25.118 CORONARY ARTERY DISEASE INVOLVING NATIVE CORONARY ARTERY OF NATIVE HEART WITH OTHER FORM OF ANGINA PECTORIS (HCC): ICD-10-CM

## 2019-03-07 DIAGNOSIS — R06.02 SHORTNESS OF BREATH: Primary | ICD-10-CM

## 2019-03-07 DIAGNOSIS — I20.8 ANGINAL EQUIVALENT (HCC): ICD-10-CM

## 2019-03-07 DIAGNOSIS — E66.9 OBESITY (BMI 30.0-34.9): ICD-10-CM

## 2019-03-07 DIAGNOSIS — R94.39 ABNORMAL STRESS TEST: ICD-10-CM

## 2019-03-07 PROCEDURE — 99214 OFFICE O/P EST MOD 30 MIN: CPT | Performed by: NURSE PRACTITIONER

## 2019-03-07 NOTE — PROGRESS NOTES
Chief Complaint   Patient presents with   • Follow-up     For cardiac management. Patient is on aspirin. Reports that he has been having shortness of breath, reports that it is the same as before. Last lab work was done on 09/11/18 per Shyla, in chart under labs.   • Med Refill     Did not bring medication list.        Cardiac Complaints  dyspnea      Subjective   Steve Santoyo is a 73 y.o. male with hypertension, hypothyroidism, hyperlipidemia, and IHD s/p bypass surgery in 2003 followed by stenting in 2014. He developed more chest pain in 2016, and stress test showed ischemia of the inferolateral wall.  Imdur was added and his symptoms resolved. At last visit, he denied any angina and imdur was stopped.  Patient returns today for follow up and denies chest pain, palpitations, or dizziness.  He does admit to shortness of breath but states it seems to be about the same. When questioned, he reports it may have worsened some with exertion but he can not be sure.  Labs have been followed by our office, most recently done in September.  At that time they showed:  H/H 14.1/43.3, , BUN 21, creatinine 1.2, Na 146, K 5.0, LDL 75, HDL 41, and TSH 0.02.  Patient was advised to follow with PCP in regards to synthroid adjustment.  No refills currently needed.        Cardiac History  Past Surgical History:   Procedure Laterality Date   • CARDIAC CATHETERIZATION  08/13/2001    100% LCX   • CARDIAC CATHETERIZATION  04/24/2002    100% LCX   • CARDIAC CATHETERIZATION  12/16/2003    50% LM, 70% LAD, 100% LCX   • CARDIAC CATHETERIZATION  06/24/2014    patent LIMA-LAD. 100% SVG-OM, 90% RCA-3.0x26 resolute   • CARDIOVASCULAR STRESS TEST  09/20/2007    7 min, 70% THR, negative   • CARDIOVASCULAR STRESS TEST  12/01/2008    7 min, 30 sec., 68% THR. R/O lateral ischemia   • CARDIOVASCULAR STRESS TEST  04/12/2010    S. Echo- 7min, 30sec, 79% THR. Negative   • CARDIOVASCULAR STRESS TEST  04/13/2012    L. Myoview- small apical ischemia    • CARDIOVASCULAR STRESS TEST  02/10/2016    L. Myoview- Inferolateral Ischemia.   • CORONARY ARTERY BYPASS GRAFT  12/18/2003    LIMA-LAD, SVG-OM.   • ECHO - CONVERTED  09/20/2007    EF >60%, Mild to mod MR.   • ECHO - CONVERTED  12/01/2008    EF>60%, Lateral WMA. RVSP- 42mmHg   • ECHO - CONVERTED  04/13/2012    EF 60-65%, aortic root 4.1cm   • ECHO - CONVERTED  07/01/2015    EF 65%   • ECHO - CONVERTED  02/10/2016    EF 60%   • OTHER SURGICAL HISTORY  04/14/2011    CT head- negative   • OTHER SURGICAL HISTORY  03/06/2017    BISHNU- no hemodynamically significant stenosis noted either side   • US CAROTID UNILATERAL  04/14/2011    no sig stenosis       Current Outpatient Medications   Medication Sig Dispense Refill   • amLODIPine (NORVASC) 2.5 MG tablet Take 1 tablet by mouth Daily. 90 tablet 3   • aspirin 81 MG EC tablet Take 81 mg by mouth daily.     • esomeprazole (nexIUM) 40 MG capsule Take 40 mg by mouth Every Morning Before Breakfast.     • ezetimibe (ZETIA) 10 MG tablet Take 1 tablet by mouth Daily. 90 tablet 3   • levothyroxine (SYNTHROID, LEVOTHROID) 75 MCG tablet Take 75 mcg by mouth Daily.     • meclizine (ANTIVERT) 25 MG tablet Take 25 mg by mouth 2 (Two) Times a Day.     • metoprolol tartrate (LOPRESSOR) 50 MG tablet Take 1 tablet by mouth Every 12 (Twelve) Hours. 180 tablet 3   • nitroglycerin (NITROSTAT) 0.4 MG SL tablet 1 under the tongue as needed for angina, may repeat q5mins for up three doses 25 tablet 1   • rosuvastatin (CRESTOR) 10 MG tablet Take 1 tablet by mouth Daily. 90 tablet 3   • rosuvastatin (CRESTOR) 10 MG tablet TAKE 1 TABLET BY MOUTH DAILY. 90 tablet 2   • sildenafil (VIAGRA) 50 MG tablet Take 1 tablet by mouth Daily As Needed for erectile dysfunction. Do not take with nitroglycerin or isosorbide 30 tablet 8     No current facility-administered medications for this visit.        Patient has no known allergies.    Past Medical History:   Diagnosis Date   • Arthritis    • GERD  (gastroesophageal reflux disease)    • Hypercholesterolemia    • Hyperlipidemia    • Hypertension    • Hypothyroidism    • IHD (ischemic heart disease)    • PUD (peptic ulcer disease)        Social History     Socioeconomic History   • Marital status:      Spouse name: Not on file   • Number of children: Not on file   • Years of education: Not on file   • Highest education level: Not on file   Social Needs   • Financial resource strain: Not on file   • Food insecurity - worry: Not on file   • Food insecurity - inability: Not on file   • Transportation needs - medical: Not on file   • Transportation needs - non-medical: Not on file   Occupational History   • Not on file   Tobacco Use   • Smoking status: Former Smoker     Last attempt to quit:      Years since quittin.2   • Smokeless tobacco: Never Used   Substance and Sexual Activity   • Alcohol use: No   • Drug use: No   • Sexual activity: Not on file   Other Topics Concern   • Not on file   Social History Narrative   • Not on file       Family History   Problem Relation Age of Onset   • Heart attack Mother    • Heart disease Mother         stent placement    • Heart attack Father         multiple   • Lung disease Father    • Heart attack Brother    • Heart disease Brother         CABG at age 42       Review of Systems   Constitution: Negative for weakness and malaise/fatigue.   Cardiovascular: Positive for dyspnea on exertion. Negative for chest pain, claudication, irregular heartbeat, leg swelling, orthopnea, palpitations and syncope.   Respiratory: Positive for shortness of breath. Negative for cough and wheezing.    Musculoskeletal: Negative for back pain, joint pain and joint swelling.   Gastrointestinal: Negative for anorexia, heartburn, nausea and vomiting.   Genitourinary: Negative for dysuria, hematuria, hesitancy and nocturia.   Neurological: Negative for dizziness, light-headedness and loss of balance.   Psychiatric/Behavioral: Negative  "for depression and memory loss. The patient is not nervous/anxious.            Objective     /80 (BP Location: Left arm)   Pulse 64   Ht 177.8 cm (70\")   Wt 100 kg (221 lb)   BMI 31.71 kg/m²     Physical Exam   Constitutional: He is oriented to person, place, and time. He appears well-developed and well-nourished.   HENT:   Head: Normocephalic and atraumatic.   Eyes: EOM are normal. Pupils are equal, round, and reactive to light.   Neck: Normal range of motion. Neck supple.   Cardiovascular: Normal rate and regular rhythm.   Murmur heard.  Pulmonary/Chest: Effort normal and breath sounds normal.   Abdominal: Soft.   Musculoskeletal: Normal range of motion.   Neurological: He is alert and oriented to person, place, and time.   Skin: Skin is warm and dry.   Psychiatric: He has a normal mood and affect. His behavior is normal.       Procedures    Assessment/Plan     HR is stable and regular.  HTN well controlled on present, same continued.  Repeat cardiac workup will be advised due to length of time since last testing and inferolateral ischemia noted on last stress.  He has stopped his imdur therapy due to wishing to take viagra, so patient advised LV function and assessment of ischemic burden needed as shortness of breath still reported. More recommendations to follow. For history of IHD, he has remained on ASA daily and tolerates well.  Bleeding and bruising denied.  Most recent FLP available shows lipids at goal with addition of zetia. He was advised to continue on both crestor and zetia as hyperlipidemia well managed.  Most recent labs show TSH low but unsure if synthroid dosing has been adjusted for his hypothyroidism. Repeat lab order provided, no refills needed. BMI elevated at 31.71, good cardiac diet with limited caloric intake, carbs, and activity as tolerated advised.  6 month follow up recommended or sooner if needed.        Problems Addressed this Visit        Cardiovascular and Mediastinum    " HTN (hypertension)    Relevant Orders    Adult Transthoracic Echo Complete W/ Cont if Necessary Per Protocol    Stress Test With Myocardial Perfusion One Day    CBC & Differential (Completed)    Comprehensive Metabolic Panel (Completed)    Lipid Panel (Completed)    TSH (Completed)       Endocrine    Hypothyroid    Relevant Orders    TSH (Completed)      Other Visit Diagnoses     Shortness of breath    -  Primary    Relevant Orders    Adult Transthoracic Echo Complete W/ Cont if Necessary Per Protocol    Stress Test With Myocardial Perfusion One Day    CBC & Differential (Completed)    Comprehensive Metabolic Panel (Completed)    Lipid Panel (Completed)    TSH (Completed)    Coronary artery disease involving native coronary artery of native heart with other form of angina pectoris (CMS/HCC)        Relevant Orders    Adult Transthoracic Echo Complete W/ Cont if Necessary Per Protocol    Stress Test With Myocardial Perfusion One Day    CBC & Differential (Completed)    Comprehensive Metabolic Panel (Completed)    Lipid Panel (Completed)    TSH (Completed)    Anginal equivalent (CMS/HCC)        Relevant Orders    Adult Transthoracic Echo Complete W/ Cont if Necessary Per Protocol    Stress Test With Myocardial Perfusion One Day    CBC & Differential (Completed)    Comprehensive Metabolic Panel (Completed)    Lipid Panel (Completed)    TSH (Completed)    Abnormal stress test        Obesity (BMI 30.0-34.9)              Patient's Body mass index is 31.71 kg/m². BMI is above normal parameters. Recommendations include: nutrition counseling.            Electronically signed by MARTHA Adames March 8, 2019 12:18 PM

## 2019-03-08 NOTE — PROGRESS NOTES
Not on any medication to increase K.  Please see eating potassium rich food.  Potatoes, tomatoes, green veggies, bananas?  If so, limit and repeat labs in one week. Push water.  Limit soda

## 2019-03-13 ENCOUNTER — HOSPITAL ENCOUNTER (OUTPATIENT)
Dept: CARDIOLOGY | Facility: HOSPITAL | Age: 74
Discharge: HOME | End: 2019-03-13

## 2019-03-13 DIAGNOSIS — R06.02 SHORTNESS OF BREATH: ICD-10-CM

## 2019-03-13 DIAGNOSIS — I25.118 CORONARY ARTERY DISEASE INVOLVING NATIVE CORONARY ARTERY OF NATIVE HEART WITH OTHER FORM OF ANGINA PECTORIS (HCC): ICD-10-CM

## 2019-03-13 DIAGNOSIS — I20.8 ANGINAL EQUIVALENT (HCC): ICD-10-CM

## 2019-03-13 DIAGNOSIS — I10 ESSENTIAL HYPERTENSION: ICD-10-CM

## 2019-03-13 LAB
BH CV ECHO MEAS - ACS: 2.2 CM
BH CV ECHO MEAS - AO MEAN PG: 3 MMHG
BH CV ECHO MEAS - AO ROOT AREA (BSA CORRECTED): 1.6
BH CV ECHO MEAS - AO ROOT AREA: 9.8 CM^2
BH CV ECHO MEAS - AO ROOT DIAM: 3.5 CM
BH CV ECHO MEAS - AO V2 MEAN: 82.9 CM/SEC
BH CV ECHO MEAS - AO V2 VTI: 25.2 CM
BH CV ECHO MEAS - BSA(HAYCOCK): 2.3 M^2
BH CV ECHO MEAS - BSA: 2.2 M^2
BH CV ECHO MEAS - BZI_BMI: 31.7 KILOGRAMS/M^2
BH CV ECHO MEAS - BZI_METRIC_HEIGHT: 177.8 CM
BH CV ECHO MEAS - BZI_METRIC_WEIGHT: 100.2 KG
BH CV ECHO MEAS - EDV(CUBED): 62.6 ML
BH CV ECHO MEAS - EDV(MOD-SP4): 60 ML
BH CV ECHO MEAS - EDV(TEICH): 68.8 ML
BH CV ECHO MEAS - EF(CUBED): 82.2 %
BH CV ECHO MEAS - EF(MOD-SP4): 53.3 %
BH CV ECHO MEAS - EF(TEICH): 75.5 %
BH CV ECHO MEAS - ESV(CUBED): 11.1 ML
BH CV ECHO MEAS - ESV(MOD-SP4): 28 ML
BH CV ECHO MEAS - ESV(TEICH): 16.8 ML
BH CV ECHO MEAS - FS: 43.7 %
BH CV ECHO MEAS - IVS/LVPW: 0.95
BH CV ECHO MEAS - IVSD: 1.2 CM
BH CV ECHO MEAS - LA DIMENSION: 3.9 CM
BH CV ECHO MEAS - LA/AO: 1.1
BH CV ECHO MEAS - LV DIASTOLIC VOL/BSA (35-75): 27.6 ML/M^2
BH CV ECHO MEAS - LV IVRT: 0.12 SEC
BH CV ECHO MEAS - LV MASS(C)D: 176.4 GRAMS
BH CV ECHO MEAS - LV MASS(C)DI: 81 GRAMS/M^2
BH CV ECHO MEAS - LV SYSTOLIC VOL/BSA (12-30): 12.9 ML/M^2
BH CV ECHO MEAS - LVIDD: 4 CM
BH CV ECHO MEAS - LVIDS: 2.2 CM
BH CV ECHO MEAS - LVLD AP4: 7.1 CM
BH CV ECHO MEAS - LVLS AP4: 5.8 CM
BH CV ECHO MEAS - LVOT AREA (M): 3.5 CM^2
BH CV ECHO MEAS - LVOT AREA: 3.5 CM^2
BH CV ECHO MEAS - LVOT DIAM: 2.1 CM
BH CV ECHO MEAS - LVPWD: 1.3 CM
BH CV ECHO MEAS - MV A MAX VEL: 85.9 CM/SEC
BH CV ECHO MEAS - MV DEC SLOPE: 319.3 CM/SEC^2
BH CV ECHO MEAS - MV E MAX VEL: 80.9 CM/SEC
BH CV ECHO MEAS - MV E/A: 0.94
BH CV ECHO MEAS - RVDD: 3 CM
BH CV ECHO MEAS - SI(AO): 113.8 ML/M^2
BH CV ECHO MEAS - SI(CUBED): 23.6 ML/M^2
BH CV ECHO MEAS - SI(MOD-SP4): 14.7 ML/M^2
BH CV ECHO MEAS - SI(TEICH): 23.9 ML/M^2
BH CV ECHO MEAS - SV(AO): 247.9 ML
BH CV ECHO MEAS - SV(CUBED): 51.5 ML
BH CV ECHO MEAS - SV(MOD-SP4): 32 ML
BH CV ECHO MEAS - SV(TEICH): 51.9 ML
BH CV STRESS COMMENTS STAGE 1: NORMAL
BH CV STRESS DOSE REGADENOSON STAGE 1: 0.4
BH CV STRESS DURATION MIN STAGE 1: 0
BH CV STRESS DURATION SEC STAGE 1: 10
BH CV STRESS PROTOCOL 1: NORMAL
BH CV STRESS RECOVERY BP: NORMAL MMHG
BH CV STRESS RECOVERY HR: 85 BPM
BH CV STRESS STAGE 1: 1
LV EF NUC BP: 66 %
MAXIMAL PREDICTED HEART RATE: 146 BPM
MAXIMAL PREDICTED HEART RATE: 146 BPM
PERCENT MAX PREDICTED HR: 58.9 %
STRESS BASELINE BP: NORMAL MMHG
STRESS BASELINE HR: 66 BPM
STRESS PERCENT HR: 69 %
STRESS POST PEAK BP: NORMAL MMHG
STRESS POST PEAK HR: 86 BPM
STRESS TARGET HR: 124 BPM
STRESS TARGET HR: 124 BPM

## 2019-03-13 PROCEDURE — 93306 TTE W/DOPPLER COMPLETE: CPT | Performed by: INTERNAL MEDICINE

## 2019-03-13 PROCEDURE — 25010000002 REGADENOSON 0.4 MG/5ML SOLUTION: Performed by: INTERNAL MEDICINE

## 2019-03-13 PROCEDURE — 0 TECHNETIUM SESTAMIBI: Performed by: INTERNAL MEDICINE

## 2019-03-13 PROCEDURE — A9500 TC99M SESTAMIBI: HCPCS | Performed by: INTERNAL MEDICINE

## 2019-03-13 PROCEDURE — 78452 HT MUSCLE IMAGE SPECT MULT: CPT | Performed by: INTERNAL MEDICINE

## 2019-03-13 PROCEDURE — 93017 CV STRESS TEST TRACING ONLY: CPT

## 2019-03-13 PROCEDURE — 93018 CV STRESS TEST I&R ONLY: CPT | Performed by: INTERNAL MEDICINE

## 2019-03-13 PROCEDURE — 93306 TTE W/DOPPLER COMPLETE: CPT

## 2019-03-13 PROCEDURE — 78452 HT MUSCLE IMAGE SPECT MULT: CPT

## 2019-03-13 RX ADMIN — TECHNETIUM TC 99M SESTAMIBI 1 DOSE: 1 INJECTION INTRAVENOUS at 13:53

## 2019-03-13 RX ADMIN — REGADENOSON 0.4 MG: 0.08 INJECTION, SOLUTION INTRAVENOUS at 13:57

## 2019-03-13 RX ADMIN — TECHNETIUM TC 99M SESTAMIBI 1 DOSE: 1 INJECTION INTRAVENOUS at 13:57

## 2019-03-18 ENCOUNTER — TELEPHONE (OUTPATIENT)
Dept: CARDIOLOGY | Facility: CLINIC | Age: 74
End: 2019-03-18

## 2019-03-18 DIAGNOSIS — E87.5 HYPERKALEMIA: Primary | ICD-10-CM

## 2019-06-19 ENCOUNTER — TELEPHONE (OUTPATIENT)
Dept: CARDIOLOGY | Facility: CLINIC | Age: 74
End: 2019-06-19

## 2019-06-19 NOTE — TELEPHONE ENCOUNTER
Patient has called and asked for recommendation/ appointment for a PCP . Do you have any suggestions ?

## 2019-06-19 NOTE — TELEPHONE ENCOUNTER
[facility] left [facility] on answering machine  [facility]  For [facility] for establish of care for patient.

## 2019-06-19 NOTE — TELEPHONE ENCOUNTER
If Dr. Mary Stinson is still taking new patients we can request appointment for him to establish care. Thank you

## 2019-06-20 DIAGNOSIS — I10 ESSENTIAL HYPERTENSION: ICD-10-CM

## 2019-06-20 DIAGNOSIS — E03.8 OTHER SPECIFIED HYPOTHYROIDISM: ICD-10-CM

## 2019-06-20 DIAGNOSIS — I25.10 CORONARY ARTERY DISEASE INVOLVING NATIVE CORONARY ARTERY OF NATIVE HEART WITHOUT ANGINA PECTORIS: ICD-10-CM

## 2019-06-20 RX ORDER — EZETIMIBE 10 MG/1
10 TABLET ORAL DAILY
Qty: 90 TABLET | Refills: 3 | Status: SHIPPED | OUTPATIENT
Start: 2019-06-20 | End: 2020-03-12 | Stop reason: SDUPTHER

## 2019-06-20 RX ORDER — ESOMEPRAZOLE MAGNESIUM 40 MG/1
40 CAPSULE, DELAYED RELEASE ORAL
Qty: 30 CAPSULE | Refills: 3 | Status: SHIPPED | OUTPATIENT
Start: 2019-06-20 | End: 2023-03-13 | Stop reason: SDUPTHER

## 2019-06-20 RX ORDER — EZETIMIBE 10 MG/1
10 TABLET ORAL DAILY
Qty: 90 TABLET | Refills: 3 | Status: SHIPPED | OUTPATIENT
Start: 2019-06-20 | End: 2019-06-20 | Stop reason: SDUPTHER

## 2019-06-20 RX ORDER — ASPIRIN 81 MG/1
81 TABLET ORAL DAILY
Qty: 30 TABLET | Refills: 3 | Status: SHIPPED | OUTPATIENT
Start: 2019-06-20

## 2019-06-20 RX ORDER — SILDENAFIL 50 MG/1
50 TABLET, FILM COATED ORAL DAILY PRN
Qty: 30 TABLET | Refills: 8 | Status: SHIPPED | OUTPATIENT
Start: 2019-06-20 | End: 2020-10-29

## 2019-06-20 RX ORDER — LEVOTHYROXINE SODIUM 0.07 MG/1
75 TABLET ORAL DAILY
Qty: 30 TABLET | Refills: 3 | Status: SHIPPED | OUTPATIENT
Start: 2019-06-20 | End: 2019-09-11 | Stop reason: DRUGHIGH

## 2019-06-20 RX ORDER — ROSUVASTATIN CALCIUM 10 MG/1
10 TABLET, COATED ORAL DAILY
Qty: 90 TABLET | Refills: 3 | Status: SHIPPED | OUTPATIENT
Start: 2019-06-20 | End: 2019-06-20 | Stop reason: SDUPTHER

## 2019-06-20 RX ORDER — ESOMEPRAZOLE MAGNESIUM 40 MG/1
40 CAPSULE, DELAYED RELEASE ORAL
Qty: 30 CAPSULE | Refills: 3 | Status: SHIPPED | OUTPATIENT
Start: 2019-06-20 | End: 2019-06-20 | Stop reason: SDUPTHER

## 2019-06-20 RX ORDER — ROSUVASTATIN CALCIUM 10 MG/1
10 TABLET, COATED ORAL DAILY
Qty: 90 TABLET | Refills: 2 | Status: SHIPPED | OUTPATIENT
Start: 2019-06-20 | End: 2019-09-11 | Stop reason: SDUPTHER

## 2019-06-20 RX ORDER — AMLODIPINE BESYLATE 2.5 MG/1
2.5 TABLET ORAL DAILY
Qty: 90 TABLET | Refills: 3 | Status: SHIPPED | OUTPATIENT
Start: 2019-06-20 | End: 2019-06-20 | Stop reason: SDUPTHER

## 2019-06-20 RX ORDER — METOPROLOL TARTRATE 50 MG/1
50 TABLET, FILM COATED ORAL EVERY 12 HOURS SCHEDULED
Qty: 180 TABLET | Refills: 3 | Status: SHIPPED | OUTPATIENT
Start: 2019-06-20 | End: 2020-03-12 | Stop reason: SDUPTHER

## 2019-06-20 RX ORDER — AMLODIPINE BESYLATE 2.5 MG/1
2.5 TABLET ORAL DAILY
Qty: 90 TABLET | Refills: 3 | Status: SHIPPED | OUTPATIENT
Start: 2019-06-20 | End: 2019-09-11 | Stop reason: SDUPTHER

## 2019-06-20 RX ORDER — METOPROLOL TARTRATE 50 MG/1
50 TABLET, FILM COATED ORAL EVERY 12 HOURS SCHEDULED
Qty: 180 TABLET | Refills: 3 | Status: SHIPPED | OUTPATIENT
Start: 2019-06-20 | End: 2019-06-20 | Stop reason: SDUPTHER

## 2019-06-20 RX ORDER — MECLIZINE HYDROCHLORIDE 25 MG/1
25 TABLET ORAL 2 TIMES DAILY
Qty: 30 TABLET | Refills: 3 | Status: SHIPPED | OUTPATIENT
Start: 2019-06-20 | End: 2019-09-11 | Stop reason: SDUPTHER

## 2019-06-20 RX ORDER — LEVOTHYROXINE SODIUM 0.07 MG/1
75 TABLET ORAL DAILY
Qty: 30 TABLET | Refills: 3 | Status: SHIPPED | OUTPATIENT
Start: 2019-06-20 | End: 2019-06-20 | Stop reason: SDUPTHER

## 2019-06-20 NOTE — TELEPHONE ENCOUNTER
Patient has called in office requesting refills  be sent into [facility] pharmacy.   He has appointment with PCP 8-5-19 with Mary Stinson .

## 2019-06-20 NOTE — TELEPHONE ENCOUNTER
I have made him an appointment with Mary Stinson 8-5-19 at 2pm  . He is almost out of medication refills , is it ok to send in his refills on all meds to do until he sees Mary Stinson ?

## 2019-09-11 ENCOUNTER — OFFICE VISIT (OUTPATIENT)
Dept: CARDIOLOGY | Facility: CLINIC | Age: 74
End: 2019-09-11

## 2019-09-11 VITALS
HEIGHT: 70 IN | DIASTOLIC BLOOD PRESSURE: 70 MMHG | HEART RATE: 60 BPM | OXYGEN SATURATION: 98 % | SYSTOLIC BLOOD PRESSURE: 140 MMHG | WEIGHT: 214.5 LBS | BODY MASS INDEX: 30.71 KG/M2

## 2019-09-11 DIAGNOSIS — R94.39 ABNORMAL NUCLEAR STRESS TEST: ICD-10-CM

## 2019-09-11 DIAGNOSIS — E03.8 OTHER SPECIFIED HYPOTHYROIDISM: ICD-10-CM

## 2019-09-11 DIAGNOSIS — E78.49 OTHER HYPERLIPIDEMIA: ICD-10-CM

## 2019-09-11 DIAGNOSIS — R42 DIZZINESS: ICD-10-CM

## 2019-09-11 DIAGNOSIS — I25.10 CORONARY ARTERY DISEASE INVOLVING NATIVE CORONARY ARTERY OF NATIVE HEART WITHOUT ANGINA PECTORIS: Primary | ICD-10-CM

## 2019-09-11 DIAGNOSIS — I10 ESSENTIAL HYPERTENSION: ICD-10-CM

## 2019-09-11 PROCEDURE — 99213 OFFICE O/P EST LOW 20 MIN: CPT | Performed by: NURSE PRACTITIONER

## 2019-09-11 RX ORDER — ROSUVASTATIN CALCIUM 10 MG/1
10 TABLET, COATED ORAL DAILY
Qty: 90 TABLET | Refills: 2 | Status: SHIPPED | OUTPATIENT
Start: 2019-09-11 | End: 2020-03-12 | Stop reason: SDUPTHER

## 2019-09-11 RX ORDER — LEVOTHYROXINE SODIUM 0.1 MG/1
100 TABLET ORAL DAILY
COMMUNITY

## 2019-09-11 RX ORDER — MECLIZINE HYDROCHLORIDE 25 MG/1
25 TABLET ORAL 2 TIMES DAILY
Qty: 30 TABLET | Refills: 3 | Status: SHIPPED | OUTPATIENT
Start: 2019-09-11 | End: 2019-09-11 | Stop reason: SDUPTHER

## 2019-09-11 RX ORDER — AMLODIPINE BESYLATE 2.5 MG/1
2.5 TABLET ORAL DAILY
Qty: 90 TABLET | Refills: 3 | Status: SHIPPED | OUTPATIENT
Start: 2019-09-11 | End: 2020-03-12 | Stop reason: SDUPTHER

## 2019-09-11 RX ORDER — MECLIZINE HYDROCHLORIDE 25 MG/1
25 TABLET ORAL 2 TIMES DAILY
Qty: 180 TABLET | Refills: 3 | Status: SHIPPED | OUTPATIENT
Start: 2019-09-11 | End: 2020-11-16

## 2019-09-11 RX ORDER — LISINOPRIL 10 MG/1
10 TABLET ORAL DAILY
COMMUNITY
End: 2021-03-30 | Stop reason: SDUPTHER

## 2019-09-11 NOTE — PATIENT INSTRUCTIONS
Potassium Content of Foods    Potassium is a mineral found in many foods and drinks. It affects how the heart works, and helps keep fluids and minerals balanced in the body.  The amount of potassium you need each day depends on your age and any medical conditions you may have. Talk to your health care provider or dietitian about how much potassium you need.  The following lists of foods provide the general serving size for foods and the approximate amount of potassium in each serving, listed in milligrams (mg). Actual values may vary depending on the product and how it is processed.  High in potassium  The following foods and beverages have 200 mg or more of potassium per serving:  · Apricots (raw) - 2 have 200 mg of potassium.  · Apricots (dry) - 5 have 200 mg of potassium.  · Artichoke - 1 medium has 345 mg of potassium.  · Avocado - ¼ fruit has 245 mg of potassium.  · Banana - 1 medium fruit has 425 mg of potassium.  · Lima or baked beans (canned) - ½ cup has 280 mg of potassium.  · White beans (canned) - ½ cup has 595 mg potassium.  · Beef roast - 3 oz has 320 mg of potassium.  · Ground beef - 3 oz has 270 mg of potassium.  · Beets (raw or cooked) - ½ cup has 260 mg of potassium.  · Bran muffin - 2 oz has 300 mg of potassium.  · Broccoli (cooked) - ½ cup has 230 mg of potassium.  · [place] sprouts - ½ cup has 250 mg of potassium.  · Cantaloupe - ½ cup has 215 mg of potassium.  · Cereal, 100% bran - ½ cup has 200-400 mg of potassium.  · Cheeseburger -1 single fast food burger has 225-400 mg of potassium.  · Chicken - 3 oz has 220 mg of potassium.  · Clams (canned) - 3 oz has 535 mg of potassium.  · Crab - 3 oz has 225 mg of potassium.  · Dates - 5 have 270 mg of potassium.  · Dried beans and peas - ½ cup has 300-475 mg of potassium.  · Figs (dried) - 2 have 260 mg of potassium.  · Fish (halibut, tuna, cod, snapper) - 3 oz has 480 mg of potassium.  · Fish (salmon, cristian, swordfish, perch) - 3 oz has 300 mg of  potassium.  · Fish (tuna, canned) - 3 oz has 200 mg of potassium.  · French fries (fast food) - 3 oz has 470 mg of potassium.  · Granola with fruit and nuts - ½ cup has 200 mg of potassium.  · Grapefruit juice - ½ cup has 200 mg of potassium.  · Honeydew melon - ½ cup has 200 mg of potassium.  · Kale (raw) - 1 cup has 300 mg of potassium.  · Kiwi - 1 medium fruit has 240 mg of potassium.  · Kohlrabi, rutabaga, parsnips - ½ cup has 280 mg of potassium.  · Lentils - ½ cup has 365 mg of potassium.  · Shukri - 1 each has 325 mg of potassium.  · Milk (nonfat, low-fat, whole, buttermilk) - 1 cup has 350-380 mg of potassium.  · Milk (chocolate) - 1 cup has 420 mg of potassium  · Molasses - 1 Tbsp has 295 mg of potassium.  · Mushrooms - ½ cup has 280 mg of potassium.  · Nectarine - 1 each has 275 mg of potassium.  · Nuts (almonds, peanuts, hazelnuts, [place], cashew, mixed) - 1 oz has 200 mg of potassium.  · Nuts (pistachios) - 1 oz has 295 mg of potassium.  · Orange - 1 fruit has 240 mg of potassium.  · Orange juice - ½ cup has 235 mg of potassium.  · Papaya - ½ medium fruit has 390 mg of potassium.  · Peanut butter (chunky) - 2 Tbsp has 240 mg of potassium.  · Peanut butter (smooth) - 2 Tbsp has 210 mg of potassium.  · Pear - 1 medium (200 mg) of potassium.  · Pomegranate - 1 whole fruit has 400 mg of potassium.  · Pomegranate juice - ½ cup has 215 mg of potassium.  · Pork - 3 oz has 350 mg of potassium.  · Potato chips (salted) - 1 oz has 465 mg of potassium.  · Potato (baked with skin) - 1 medium has 925 mg of potassium.  · Potato (boiled) - ½ cup has 255 mg of potassium.  · Potato (Mashed) - ½ cup has 330 mg of potassium.  · Prune juice - ½ cup has 370 mg of potassium.  · Prunes - 5 have 305 mg of potassium.  · Pudding (chocolate) - ½ cup has 230 mg of potassium.  · Pumpkin (canned) - ½ cup has 250 mg of potassium.  · Raisins (seedless) - ¼ cup has 270 mg of potassium.  · Seeds (sunflower or pumpkin) - 1 oz has 240 mg of  potassium.  · Soy milk - 1 cup has 300 mg of potassium.  · Spinach (cooked) - 1/2 cup has 420 mg of potassium.  · Spinach (canned) - ½ cup has 370 mg of potassium.  · Sweet potato (baked with skin) - 1 medium has 450 mg of potassium.  · Swiss chard - ½ cup has 480 mg of potassium.  · Tomato or vegetable juice - ½ cup has 275 mg of potassium.  · Tomato (sauce or puree) - ½ cup has 400-550 mg of potassium.  · Tomato (raw) - 1 medium has 290 mg of potassium.  · Tomato (canned) - ½ cup has 200-300 mg of potassium.  · Turkey - 3 oz has 250 mg of potassium.  · Wheat germ - 1 oz has 250 mg of potassium.  · Winter squash - ½ cup has 250 mg of potassium.  · Yogurt (plain or fruited) - 6 oz has 260-435 mg of potassium.  · Zucchini - ½ cup has 220 mg of potassium.  Moderate in potassium  The following foods and beverages have  mg of potassium per serving:  · Apple - 1 fruit has 150 mg of potassium  · Apple juice - ½ cup has 150 mg of potassium  · Applesauce - ½ cup has 90 mg of potassium  · Apricot nectar - ½ cup has 140 mg of potassium  · Asparagus (small milton) - ½ cup has 155 mg of potassium  · Asparagus (large milton) - 6 have 155 mg of potassium  · Bagel (cinnamon raisin) - 1 four-inch bagel has 130 mg of potassium  · Bagel (egg or plain) - 1 four- inch bagel has 70 mg of potassium  · Beans (green) - ½ cup has 90 mg of potassium  · Beans (yellow) - ½ cup has 190 mg of potassium  · Beer, regular - 12 oz has 100 mg of potassium  · Beets (canned) - ½ cup has 125 mg of potassium  · Blackberries - ½ cup has 115 mg of potassium  · Blueberries - ½ cup has 60 mg of potassium  · Bread (whole wheat) - 1 slice has 70 mg of potassium  · Broccoli (raw) - ½ cup has 145 mg of potassium  · Cabbage - ½ cup has 150 mg of potassium  · Carrots (cooked or raw) - ½ cup has 180 mg of potassium  · Cauliflower (raw) - ½ cup has 150 mg of potassium  · Celery (raw) - ½ cup has 155 mg of potassium  · Cereal, bran flakes - ½ cup has 120-150 mg  of potassium  · Cheese (cottage) - ½ cup has 110 mg of potassium  · Cherries - 10 have 150 mg of potassium  · Chocolate - 1½ oz bar has 165 mg of potassium  · Coffee (brewed) - 6 oz has 90 mg of potassium  · Corn - ½ cup or 1 ear has 195 mg of potassium  · Cucumbers - ½ cup has 80 mg of potassium  · Egg - 1 large egg has 60 mg of potassium  · Eggplant - ½ cup has 60 mg of potassium  · Endive (raw) - ½ cup has 80 mg of potassium  · English muffin - 1 has 65 mg of potassium  · Fish (ocean perch) - 3 oz has 192 mg of potassium  · Frankfurter, beef or pork - 1 has 75 mg of potassium  · Fruit cocktail - ½ cup has 115 mg of potassium  · Grape juice - ½ cup has 170 mg of potassium  · Grapefruit - ½ fruit has 175 mg of potassium  · Grapes - ½ cup has 155 mg of potassium  · Greens: kale, turnip, carlitos - ½ cup has 110-150 mg of potassium  · Ice cream or frozen yogurt (chocolate) - ½ cup has 175 mg of potassium  · Ice cream or frozen yogurt (vanilla) - ½ cup has 120-150 mg of potassium  · Get, limes - 1 each has 80 mg of potassium  · Lettuce - 1 cup has 100 mg of potassium  · Mixed vegetables - ½ cup has 150 mg of potassium  · Mushrooms, raw - ½ cup has 110 mg of potassium  · Nuts (walnuts, pecans, or macadamia) - 1 oz has 125 mg of potassium  · Oatmeal - ½ cup has 80 mg of potassium  · Okra - ½ cup has 110 mg of potassium  · Onions - ½ cup has 120 mg of potassium  · Peach - 1 has 185 mg of potassium  · Peaches (canned) - ½ cup has 120 mg of potassium  · Pears (canned) - ½ cup has 120 mg of potassium  · Peas, green (frozen) - ½ cup has 90 mg of potassium  · Peppers (Green) - ½ cup has 130 mg of potassium  · Peppers (Red) - ½ cup has 160 mg of potassium  · Pineapple juice - ½ cup has 165 mg of potassium  · Pineapple (fresh or canned) - ½ cup has 100 mg of potassium  · Plums - 1 has 105 mg of potassium  · Pudding, vanilla - ½ cup has 150 mg of potassium  · Raspberries - ½ cup has 90 mg of potassium  · Rhubarb - ½ cup has  115 mg of potassium  · Rice, wild - ½ cup has 80 mg of potassium  · Shrimp - 3 oz has 155 mg of potassium  · Spinach (raw) - 1 cup has 170 mg of potassium  · Strawberries - ½ cup has 125 mg of potassium  · Summer squash - ½ cup has 175-200 mg of potassium  · Swiss chard (raw) - 1 cup has 135 mg of potassium  · Tangerines - 1 fruit has 140 mg of potassium  · Tea, brewed - 6 oz has 65 mg of potassium  · Turnips - ½ cup has 140 mg of potassium  · Watermelon - ½ cup has 85 mg of potassium  · Wine (Red, table) - 5 oz has 180 mg of potassium  · Wine (White, table) - 5 oz 100 mg of potassium  Low in potassium  The following foods and beverages have less than 50 mg of potassium per serving.  · Bread (white) - 1 slice has 30 mg of potassium  · Carbonated beverages - 12 oz has less than 5 mg of potassium  · Cheese - 1 oz has 20-30 mg of potassium  · Cranberries - ½ cup has 45 mg of potassium  · Cranberry juice cocktail - ½ cup has 20 mg of potassium  · Fats and oils - 1 Tbsp has less than 5 mg of potassium  · Hummus - 1 Tbsp has 32 mg of potassium  · Nectar (papaya, marilin, or pear) - ½ cup has 35 mg of potassium  · Rice (white or brown) - ½ cup has 50 mg of potassium  · Spaghetti or macaroni (cooked) - ½ cup has 30 mg of potassium  · Tortilla, flour or corn - 1 has 50 mg of potassium  · Waffle - 1 four-inch waffle has 50 mg of potassium  · Water chestnuts - ½ cup has 40 mg of potassium  Summary  · Potassium is a mineral found in many foods and drinks. It affects how the heart works, and helps keep fluids and minerals balanced in the body.  · The amount of potassium you need each day depends on your age and any existing medical conditions you may have. Your health care provider or dietitian may recommend an amount of potassium that you should have each day.  This information is not intended to replace advice given to you by your health care provider. Make sure you discuss any questions you have with your health care  provider.  Document Released: 08/01/2006 Document Revised: 03/14/2018 Document Reviewed: 03/14/2018  [email or web address] Interactive Patient Education © 2019 [email or web address]    Physical Activity With Heart Disease  Being active has many benefits, especially if you have heart disease. Physical activity can help you do more and feel healthier. Start slowly, and increase the amount of time you spend being active. Most adults should aim for physical activity that:  · Makes you breathe harder and raises your heart rate (aerobic activity). Try to get at least 150 minutes of aerobic activity each week. This is about 30 minutes each day, 5 days a week.  · Helps build muscle strength (strengthening activity). Do this at least 2 times a week.  Always talk with your health care provider before starting any new activity program or if you have any changes in your condition.  What are the benefits of physical activity?  When you have heart disease, physical activity can help:  · Lower your blood pressure.  · Lower your cholesterol.  · Control your weight.  · Improve your sleep.  · Help control your blood sugar.  · Improve your heart and lung function.  · Reduce your risk for blood clots (thrombophlebitis).  · Improve your energy level.  · Reduce stress.  What are some types of physical activity I could try?  There are many ways to be active. Talk with your health care provider about what types and intensity of activity is right for you.  Aerobic activity    Aerobic (cardiovascular) activity can be moderate or vigorous intensity, depending on how hard you are working.  Moderate-intensity activity includes:  · Walking.  · Slow bicycling.  · Water aerobics.  · Dancing.  · Light gardening or house work.  Vigorous-intensity activity includes:  · Jogging or running.  · Stair climbing.  · Swimming laps.  · Hiking uphill.  · Heavy gardening, such as digging trenches.    Strengthening activity  Strengthening activities work your muscles to build  strength. Some examples include:  · Doing push-ups, sit-ups, or pull-ups.  · Lifting small weights.  · Using resistance bands.    Flexibility  Flexibility activities lengthen your muscles to keep them flexible and less tight and improve your balance. Some examples include:  · Stretching.  · Yoga.  · Hal chi.  · Ballet barre.    Follow these instructions at home:  How to get started  · Talk with your health care provider about:  ? What types of activities are safe for you.  ? If you should check your pulse or take other precautions during physical activity.  · Get a calendar. Write down a schedule and plan for your new routine.  · Take time to find out what works for you. Consider:  ? Joining a community program, such as a biking group, yoga class, local gym, or swimming pool membership.  ? Be active on your own by downloading free workout applications on a smartphone or other devices, or by purchasing workout DVDs.  · If you have not been active, begin with sessions that last 10-15 minutes. Gradually work up to sessions that last 20-30 minutes, 5 times a week. Follow all of your health care provider's recommendations.  · Be patient with yourself. It takes time to build up strength and lung capacity.  Safety  · Exercise in an indoor, climate-controlled facility, as told by your health care provider. You may need to do this if:  ? There are extreme outdoor conditions, such as heat, humidity, or cold.  ? There is an air pollution advisory. Your local news, board of health, or hospital can provide information on air quality.  · Take extra precautions as told by your health care provider. This may include:  ? Monitoring your heart rate.  ? Avoiding heavy lifting.  ? Understanding how your medicines can affect you during physical activity. Certain medicines may cause heat intolerance or changes in blood sugar.  ? Slowing down to rest when you need to.  ? Keeping nitroglycerin spray and tablets with you at all times if you  have angina. Use them as told to prevent and treat symptoms.  · Drink plenty of water before, during, and after physical activity.  · Know what symptoms may be signs of a problem. Stop physical activity right away if you have any of these symptoms.  Get help right away if you have any of the following during exercise:  · Chest pain, shortness of breath, or feel very tired.  · Pain in the arm, shoulder, neck, or jaw.  · Feel weak, dizzy, or light-headed.  · An irregular heart rate, or your heart rate is greater than 100 beats per minute (bpm) before exercise.  These symptoms may represent a serious problem that is an emergency. Do not wait to see if the symptoms go away. Get medical help right away. Call your local emergency services (911 in the [place] Do not drive yourself to the hospital.   Summary  · Physical activity has many benefits, especially if you have heart disease.  · Before starting an activity program, talk with your health care provider about how often to be active and what type of activity is safe for you.  · Your physical activity plan may include moderate or vigorous aerobic activity, strengthening activities, and flexibility.  · Know what symptoms may be signs of a problem. Stop physical activity right away and call emergency services (911 in the [place] if you have any of these symptoms.  This information is not intended to replace advice given to you by your health care provider. Make sure you discuss any questions you have with your health care provider.  Document Released: 07/15/2015 Document Revised: 01/09/2019 Document Reviewed: 01/09/2019  [employer] Interactive Patient Education © 2019 [employer]

## 2019-09-11 NOTE — PROGRESS NOTES
"Chief Complaint   Patient presents with   • Follow-up     Cardiac management. Denies chest pain, palpitations some shortness of breath.   • Med Refill     Needs refills on Amlodipine, Rosuvastatin, Meclizine 90 days sent to [facility]. Unsure about the Metoprolol -Dr. Stinson increased to  mg but he wants to be sure to take corredt dose       Subjective       Steve Santoyo is a 74 y.o. male with hypertension, hypothyroidism, hyperlipidemia, and IHD s/p bypass surgery in 2003 followed by stenting in 2014. He developed more chest pain in 2016, and stress test showed ischemia of the inferolateral wall.  Imdur was added and his symptoms resolved.  Later in 2018, he denied any angina and imdur was stopped.  In March 2019 repeat cardiac work-up was advised due to history and length of time from prior testing.  Lexiscan stress test showed area of ischemia in the basal inferior lateral wall area, similar to prior stress test.  Medical management was advised with plan for elective cardiac catheterization if developed symptoms.  Lab work showed mild increase in potassium and advised on low potassium foods.  Most recent labs showed potassium improved from 5.4 to 5.3.    Today he comes to the office for a follow-up visit.  He maintains his normal daily activities.  With exertion he does develop shortness of breath and a brief sharp chest pain that resolves quickly with rest.  He has not had to use Nitrostat.  He denies fatigue or palpitations.  He has had dizziness for \"years\" which he manages with meclizine.    HPI     Cardiac History:    Past Surgical History:   Procedure Laterality Date   • CARDIAC CATHETERIZATION  08/13/2001    100% LCX   • CARDIAC CATHETERIZATION  04/24/2002    100% LCX   • CARDIAC CATHETERIZATION  12/16/2003    50% LM, 70% LAD, 100% LCX   • CARDIAC CATHETERIZATION  06/24/2014    patent LIMA-LAD. 100% SVG-OM, 90% RCA-3.0x26 resolute   • CARDIOVASCULAR STRESS TEST  09/20/2007    7 min, 70% THR, negative   • " CARDIOVASCULAR STRESS TEST  12/01/2008    7 min, 30 sec., 68% THR. R/O lateral ischemia   • CARDIOVASCULAR STRESS TEST  04/12/2010    S. Echo- 7min, 30sec, 79% THR. Negative   • CARDIOVASCULAR STRESS TEST  04/13/2012    L. Myoview- small apical ischemia   • CARDIOVASCULAR STRESS TEST  02/10/2016    L. Myoview- Inferolateral Ischemia. EF > 65%   • CARDIOVASCULAR STRESS TEST  03/13/2019    L.Cardiolite- EF 66%. Inferolateral Ischemia.   • CORONARY ARTERY BYPASS GRAFT  12/18/2003    LIMA-LAD, SVG-OM.   • ECHO - CONVERTED  09/20/2007    EF >60%, Mild to mod MR.   • ECHO - CONVERTED  12/01/2008    EF>60%, Lateral WMA. RVSP- 42mmHg   • ECHO - CONVERTED  04/13/2012    EF 60-65%, aortic root 4.1cm   • ECHO - CONVERTED  07/01/2015    EF 65%   • ECHO - CONVERTED  02/10/2016    EF 60%   • ECHO - CONVERTED  03/13/2019    EF 65%. Mild MR. LA- 3.9 Cm   • OTHER SURGICAL HISTORY  04/14/2011    CT head- negative   • OTHER SURGICAL HISTORY  03/06/2017    BISHNU- no hemodynamically significant stenosis noted either side   • US CAROTID UNILATERAL  04/14/2011    no sig stenosis       Current Outpatient Medications   Medication Sig Dispense Refill   • aspirin 81 MG EC tablet Take 1 tablet by mouth Daily. 30 tablet 3   • esomeprazole (nexIUM) 40 MG capsule Take 1 capsule by mouth Every Morning Before Breakfast. 30 capsule 3   • ezetimibe (ZETIA) 10 MG tablet Take 1 tablet by mouth Daily. 90 tablet 3   • levothyroxine (SYNTHROID, LEVOTHROID) 100 MCG tablet Take 100 mcg by mouth Daily.     • lisinopril (PRINIVIL,ZESTRIL) 10 MG tablet Take 10 mg by mouth Daily.     • meclizine (ANTIVERT) 25 MG tablet Take 1 tablet by mouth 2 (Two) Times a Day. 180 tablet 3   • metoprolol tartrate (LOPRESSOR) 50 MG tablet Take 1 tablet by mouth Every 12 (Twelve) Hours. 180 tablet 3   • sildenafil (VIAGRA) 50 MG tablet Take 1 tablet by mouth Daily As Needed for erectile dysfunction. Do not take with nitroglycerin or isosorbide 30 tablet 8   • amLODIPine (NORVASC)  2.5 MG tablet Take 1 tablet by mouth Daily. 90 tablet 3   • nitroglycerin (NITROSTAT) 0.4 MG SL tablet 1 under the tongue as needed for angina, may repeat q5mins for up three doses 25 tablet 1   • rosuvastatin (CRESTOR) 10 MG tablet Take 1 tablet by mouth Daily. 90 tablet 2     No current facility-administered medications for this visit.        Patient has no known allergies.    Past Medical History:   Diagnosis Date   • Arthritis    • GERD (gastroesophageal reflux disease)    • Hypercholesterolemia    • Hyperlipidemia    • Hypertension    • Hypothyroidism    • IHD (ischemic heart disease)    • PUD (peptic ulcer disease)        Social History     Socioeconomic History   • Marital status:      Spouse name: Not on file   • Number of children: Not on file   • Years of education: Not on file   • Highest education level: Not on file   Tobacco Use   • Smoking status: Former Smoker     Last attempt to quit:      Years since quittin.7   • Smokeless tobacco: Never Used   Substance and Sexual Activity   • Alcohol use: No   • Drug use: No       Family History   Problem Relation Age of Onset   • Heart attack Mother    • Heart disease Mother         stent placement    • Heart attack Father         multiple   • Lung disease Father    • Heart attack Brother    • Heart disease Brother         CABG at age 42       Review of Systems   Constitution: Negative for decreased appetite and malaise/fatigue.   HENT: Negative for congestion, hoarse voice, nosebleeds and sore throat.    Eyes: Negative for blurred vision.   Cardiovascular: Negative for chest pain, irregular heartbeat, leg swelling, near-syncope and palpitations.   Respiratory: Positive for shortness of breath. Negative for cough.    Endocrine: Negative for cold intolerance and heat intolerance.   Hematologic/Lymphatic: Negative for adenopathy. Does not bruise/bleed easily.   Skin: Negative for color change, dry skin and itching.   Musculoskeletal: Positive for  "arthritis. Negative for muscle cramps and myalgias.   Gastrointestinal: Negative for abdominal pain, change in bowel habit, dysphagia, heartburn, melena and nausea.   Genitourinary: Negative for dysuria and hematuria.   Neurological: Negative for dizziness and light-headedness.   Psychiatric/Behavioral: Negative for altered mental status. The patient does not have insomnia.    Allergic/Immunologic: Negative for environmental allergies and hives.        Objective      Labs 3/8/2019: CBC unremarkable, glucose 96, BUN 19, creatinine 1.5, sodium 140, potassium 5.4, calcium 9.6, total protein 7.5, albumin 4.5, AST 26, ALT 26, ALP 72, GFR 49, total cholesterol 137, triglycerides 91, HDL 47, LDL 72, TSH 2.38.    /70 (BP Location: Left arm)   Pulse 60   Ht 177.8 cm (70\")   Wt 97.3 kg (214 lb 8 oz)   SpO2 98%   BMI 30.78 kg/m²     Physical Exam   Constitutional: He is oriented to person, place, and time. Vital signs are normal. He appears well-nourished.   HENT:   Head: Normocephalic.   Eyes: Conjunctivae are normal. Pupils are equal, round, and reactive to light.   Neck: Normal range of motion. Neck supple. Carotid bruit is not present.   Cardiovascular: Normal rate, regular rhythm, S1 normal and S2 normal.   No murmur heard.  Pulses:       Radial pulses are 2+ on the right side, and 2+ on the left side.   Pulmonary/Chest: Breath sounds normal.   Abdominal: Soft. Bowel sounds are normal.   Musculoskeletal: Normal range of motion.   Neurological: He is alert and oriented to person, place, and time.   Skin: Skin is warm and dry. No pallor.   Psychiatric: He has a normal mood and affect. His behavior is normal.        Procedures: none today         Assessment/Plan      Steve was seen today for follow-up and med refill.    Diagnoses and all orders for this visit:    Coronary artery disease involving native coronary artery of native heart without angina pectoris    Abnormal nuclear stress test    Essential " hypertension  -     amLODIPine (NORVASC) 2.5 MG tablet; Take 1 tablet by mouth Daily.    Other hyperlipidemia  -     rosuvastatin (CRESTOR) 10 MG tablet; Take 1 tablet by mouth Daily.    Other specified hypothyroidism    Dizziness    Other orders  -     Discontinue: meclizine (ANTIVERT) 25 MG tablet; Take 1 tablet by mouth 2 (Two) Times a Day.  -     meclizine (ANTIVERT) 25 MG tablet; Take 1 tablet by mouth 2 (Two) Times a Day.      The reports of his recent cardiac stress test and echocardiogram were reviewed.  He has mild area of ischemia which is similar to previous stress test in 2016.  Since he remains asymptomatic he has continued medical management.  If symptoms or concerns develop then elective cardiac catheterization will be advised.    His blood pressure, heart rate, and heart rhythm today are normal.  No change in cardiac medications made.  Refills given for Norvasc.  He is currently on metoprolol at 50 mg twice a day.  I advised him to monitor blood pressure and heart rate.     For hypercholesterolemia he is on high intensity statin in the form of Crestor.  He appears to be tolerating well and most recent lipid panel at goal.  Refills given to continue Crestor at 10 mg daily.    Patient's Body mass index is 30.78 kg/m². BMI is above normal parameters. Recommendations include: nutrition counseling.  His weight is down 7 pounds since last visit.  I encouraged him on diet for weight loss.  I gave him information handout on exercise which will also benefit weight management.    Historically he has had issues with dizziness managed with meclizine.  Refills given to continue same.  Should dizziness worsen then repeat carotid ultrasound will be considered.     A 6-month follow-up visit scheduled.  Please call sooner for any cardiac concerns.             Electronically signed by MARTHA Ellsworth,  September 11, 2019 2:30 PM

## 2019-09-26 RX ORDER — ESOMEPRAZOLE MAGNESIUM 40 MG/1
CAPSULE, DELAYED RELEASE ORAL
Qty: 90 CAPSULE | Refills: 1 | OUTPATIENT
Start: 2019-09-26

## 2020-03-12 ENCOUNTER — OFFICE VISIT (OUTPATIENT)
Dept: CARDIOLOGY | Facility: CLINIC | Age: 75
End: 2020-03-12

## 2020-03-12 VITALS
DIASTOLIC BLOOD PRESSURE: 84 MMHG | SYSTOLIC BLOOD PRESSURE: 168 MMHG | HEIGHT: 70 IN | HEART RATE: 68 BPM | BODY MASS INDEX: 31.78 KG/M2 | WEIGHT: 222 LBS

## 2020-03-12 DIAGNOSIS — R06.02 SHORTNESS OF BREATH: ICD-10-CM

## 2020-03-12 DIAGNOSIS — I10 ESSENTIAL HYPERTENSION: ICD-10-CM

## 2020-03-12 DIAGNOSIS — I25.9 IHD (ISCHEMIC HEART DISEASE): Primary | ICD-10-CM

## 2020-03-12 DIAGNOSIS — E88.81 METABOLIC SYNDROME: ICD-10-CM

## 2020-03-12 DIAGNOSIS — E03.9 HYPOTHYROIDISM, UNSPECIFIED TYPE: ICD-10-CM

## 2020-03-12 DIAGNOSIS — E78.00 HYPERCHOLESTEREMIA: ICD-10-CM

## 2020-03-12 PROBLEM — E78.5 HYPERLIPEMIA: Status: RESOLVED | Noted: 2017-02-28 | Resolved: 2020-03-12

## 2020-03-12 PROBLEM — E88.810 METABOLIC SYNDROME: Status: ACTIVE | Noted: 2020-03-12

## 2020-03-12 PROBLEM — I25.10 CORONARY ARTERY DISEASE INVOLVING NATIVE CORONARY ARTERY OF NATIVE HEART WITHOUT ANGINA PECTORIS: Status: RESOLVED | Noted: 2017-02-28 | Resolved: 2020-03-12

## 2020-03-12 PROCEDURE — 99213 OFFICE O/P EST LOW 20 MIN: CPT | Performed by: INTERNAL MEDICINE

## 2020-03-12 RX ORDER — ROSUVASTATIN CALCIUM 10 MG/1
10 TABLET, COATED ORAL DAILY
Qty: 90 TABLET | Refills: 3 | Status: SHIPPED | OUTPATIENT
Start: 2020-03-12 | End: 2021-03-30 | Stop reason: SDUPTHER

## 2020-03-12 RX ORDER — METOPROLOL TARTRATE 50 MG/1
50 TABLET, FILM COATED ORAL EVERY 12 HOURS SCHEDULED
Qty: 180 TABLET | Refills: 3 | Status: SHIPPED | OUTPATIENT
Start: 2020-03-12 | End: 2021-03-30 | Stop reason: SDUPTHER

## 2020-03-12 RX ORDER — HYDROCHLOROTHIAZIDE 12.5 MG/1
12.5 CAPSULE, GELATIN COATED ORAL DAILY
Qty: 90 CAPSULE | Refills: 3 | Status: SHIPPED | OUTPATIENT
Start: 2020-03-12 | End: 2021-03-30 | Stop reason: SDUPTHER

## 2020-03-12 RX ORDER — AMLODIPINE BESYLATE 2.5 MG/1
2.5 TABLET ORAL DAILY
Qty: 90 TABLET | Refills: 3 | Status: SHIPPED | OUTPATIENT
Start: 2020-03-12 | End: 2021-03-19

## 2020-03-12 RX ORDER — EZETIMIBE 10 MG/1
10 TABLET ORAL DAILY
Qty: 90 TABLET | Refills: 3 | Status: SHIPPED | OUTPATIENT
Start: 2020-03-12 | End: 2021-03-30 | Stop reason: SDUPTHER

## 2020-03-12 NOTE — PROGRESS NOTES
Chief Complaint   Patient presents with   • Follow-up     for cardiac management   • Med Refill     refills needed on cardiac meds.  90 days to [facility]   • Labs     PCP checked 2/7/20   • Hypertension     BP elevated over past few weeks, 168/84 today, comparable to his readings at home.    • Medication Problem     PCP stopped Lisinopril due to K+being elevated several months ago.        CARDIAC COMPLAINTS  Fatigue, BP Problem        Subjective   Steve Santoyo is a 74 y.o. male came in today for his follow-up visit.  He has history of ischemic heart disease diagnosed in 2001 when he was found to have an occluded circumflex which was confirmed in 2002 also.  In 2003 found to have worsening of disease involving the left main and the LAD and underwent two-vessel bypass surgery.  In 2014 found that the vein graft to the obtuse marginal was occluded and RCA had new lesion for which he underwent stenting.  Since then he has undergone stress test twice once in 2016 other one is 2019 and both of them showed some changes in the inferior wall.  He did not have any more anginal symptoms have been managing him medically.  He came today stating that his blood pressure has been little more elevated recently.  Apparently the lisinopril was stopped secondary to elevated potassium level.  He has some occasional problems of feeling tired and fatigue and also when the blood pressure goes up he feels different.  He had some labs done recently and according to him other than the elevated potassium everything else was normal.  His renal function though was borderline.              Cardiac History  Past Surgical History:   Procedure Laterality Date   • CARDIAC CATHETERIZATION  08/13/2001    100% LCX   • CARDIAC CATHETERIZATION  04/24/2002    100% LCX   • CARDIAC CATHETERIZATION  12/16/2003    50% LM, 70% LAD, 100% LCX   • CARDIAC CATHETERIZATION  06/24/2014    patent LIMA-LAD. 100% SVG-OM, 90% RCA-3.0x26 resolute   • CARDIOVASCULAR STRESS  TEST  09/20/2007    7 min, 70% THR, negative   • CARDIOVASCULAR STRESS TEST  12/01/2008    7 min, 30 sec., 68% THR. R/O lateral ischemia   • CARDIOVASCULAR STRESS TEST  04/12/2010    S. Echo- 7min, 30sec, 79% THR. Negative   • CARDIOVASCULAR STRESS TEST  04/13/2012    L. Myoview- small apical ischemia   • CARDIOVASCULAR STRESS TEST  02/10/2016    L. Myoview- Inferolateral Ischemia. EF > 65%   • CARDIOVASCULAR STRESS TEST  03/13/2019    L.Cardiolite- EF 66%. Inferolateral Ischemia.   • CORONARY ARTERY BYPASS GRAFT  12/18/2003    LIMA-LAD, SVG-OM.   • ECHO - CONVERTED  09/20/2007    EF >60%, Mild to mod MR.   • ECHO - CONVERTED  12/01/2008    EF>60%, Lateral WMA. RVSP- 42mmHg   • ECHO - CONVERTED  04/13/2012    EF 60-65%, aortic root 4.1cm   • ECHO - CONVERTED  07/01/2015    EF 65%   • ECHO - CONVERTED  02/10/2016    EF 60%   • ECHO - CONVERTED  03/13/2019    EF 65%. Mild MR. LA- 3.9 Cm   • OTHER SURGICAL HISTORY  04/14/2011    CT head- negative   • OTHER SURGICAL HISTORY  03/06/2017    BISHNU- no hemodynamically significant stenosis noted either side   • US CAROTID UNILATERAL  04/14/2011    no sig stenosis       Current Outpatient Medications   Medication Sig Dispense Refill   • amLODIPine (NORVASC) 2.5 MG tablet Take 1 tablet by mouth Daily. 90 tablet 3   • aspirin 81 MG EC tablet Take 1 tablet by mouth Daily. 30 tablet 3   • esomeprazole (nexIUM) 40 MG capsule Take 1 capsule by mouth Every Morning Before Breakfast. 30 capsule 3   • ezetimibe (ZETIA) 10 MG tablet Take 1 tablet by mouth Daily. 90 tablet 3   • levothyroxine (SYNTHROID, LEVOTHROID) 100 MCG tablet Take 100 mcg by mouth Daily.     • meclizine (ANTIVERT) 25 MG tablet Take 1 tablet by mouth 2 (Two) Times a Day. 180 tablet 3   • metoprolol tartrate (LOPRESSOR) 50 MG tablet Take 1 tablet by mouth Every 12 (Twelve) Hours. 180 tablet 3   • nitroglycerin (NITROSTAT) 0.4 MG SL tablet 1 under the tongue as needed for angina, may repeat q5mins for up three doses 25  tablet 1   • rosuvastatin (CRESTOR) 10 MG tablet Take 1 tablet by mouth Daily. 90 tablet 3   • sildenafil (VIAGRA) 50 MG tablet Take 1 tablet by mouth Daily As Needed for erectile dysfunction. Do not take with nitroglycerin or isosorbide 30 tablet 8   • hydroCHLOROthiazide (MICROZIDE) 12.5 MG capsule Take 1 capsule by mouth Daily. 90 capsule 3   • lisinopril (PRINIVIL,ZESTRIL) 10 MG tablet Take 10 mg by mouth Daily.       No current facility-administered medications for this visit.        Allergies  :  Patient has no known allergies.       Past Medical History:   Diagnosis Date   • Arthritis    • GERD (gastroesophageal reflux disease)    • Hypercholesterolemia    • Hyperlipidemia    • Hypertension    • Hypothyroidism    • IHD (ischemic heart disease)    • PUD (peptic ulcer disease)        Social History     Socioeconomic History   • Marital status:      Spouse name: Not on file   • Number of children: Not on file   • Years of education: Not on file   • Highest education level: Not on file   Tobacco Use   • Smoking status: Former Smoker     Last attempt to quit:      Years since quittin.2   • Smokeless tobacco: Never Used   Substance and Sexual Activity   • Alcohol use: No   • Drug use: No       Family History   Problem Relation Age of Onset   • Heart attack Mother    • Heart disease Mother         stent placement    • Heart attack Father         multiple   • Lung disease Father    • Heart attack Brother    • Heart disease Brother         CABG at age 42       Review of Systems   Constitution: Positive for malaise/fatigue. Negative for decreased appetite.   HENT: Negative for congestion and sore throat.    Eyes: Negative for blurred vision.   Cardiovascular: Negative for chest pain and dyspnea on exertion.   Respiratory: Negative for shortness of breath and snoring.    Endocrine: Negative for cold intolerance and heat intolerance.   Hematologic/Lymphatic: Negative for adenopathy. Does not bruise/bleed  "easily.   Skin: Negative for itching, nail changes and skin cancer.   Musculoskeletal: Negative for arthritis and myalgias.   Gastrointestinal: Negative for abdominal pain, dysphagia and heartburn.   Genitourinary: Negative for bladder incontinence and frequency.   Neurological: Negative for dizziness, light-headedness, seizures and vertigo.   Psychiatric/Behavioral: Negative for altered mental status.   Allergic/Immunologic: Negative for environmental allergies and hives.       Diabetes- No  Thyroid- normal    Objective     /84   Pulse 68   Ht 177.8 cm (70\")   Wt 101 kg (222 lb)   BMI 31.85 kg/m²     Physical Exam   Constitutional: He is oriented to person, place, and time. He appears well-developed and well-nourished.   HENT:   Head: Normocephalic.   Nose: Nose normal.   Eyes: Pupils are equal, round, and reactive to light. EOM are normal.   Neck: Normal range of motion. Neck supple.   Cardiovascular: Normal rate, regular rhythm, S1 normal and S2 normal.   Murmur heard.  Pulmonary/Chest: Effort normal and breath sounds normal.   Abdominal: Soft. Bowel sounds are normal.   Musculoskeletal: Normal range of motion. He exhibits no edema.   Neurological: He is alert and oriented to person, place, and time.   Skin: Skin is warm and dry.   Psychiatric: He has a normal mood and affect.     Procedures            Assessment/Plan   Patient's Body mass index is 31.85 kg/m². BMI is above normal parameters. Recommendations include: educational material, exercise counseling and nutrition counseling.     Steve was seen today for follow-up, med refill, labs, hypertension and medication problem.    Diagnoses and all orders for this visit:    IHD (ischemic heart disease)  -     metoprolol tartrate (LOPRESSOR) 50 MG tablet; Take 1 tablet by mouth Every 12 (Twelve) Hours.  -     CBC & Differential; Future    Essential hypertension  -     metoprolol tartrate (LOPRESSOR) 50 MG tablet; Take 1 tablet by mouth Every 12 (Twelve) " Hours.  -     amLODIPine (NORVASC) 2.5 MG tablet; Take 1 tablet by mouth Daily.  -     hydroCHLOROthiazide (MICROZIDE) 12.5 MG capsule; Take 1 capsule by mouth Daily.  -     Comprehensive Metabolic Panel; Future    Hypercholesteremia  -     rosuvastatin (CRESTOR) 10 MG tablet; Take 1 tablet by mouth Daily.  -     ezetimibe (ZETIA) 10 MG tablet; Take 1 tablet by mouth Daily.  -     Lipid Panel; Future    Hypothyroidism, unspecified type    Metabolic syndrome    Shortness of breath       At baseline his heart rate is stable.  His blood pressure is moderately elevated.  His BMI is around 32.  His cardiovascular examination reveals slightly loud second heart sound and short systolic murmur at the mitral area.  He has no pedal edema and normal peripheral pulse.    Regarding his ischemic heart disease, he is not having any anginal symptoms. At this time continue the low-dose of beta-blockers, aspirin and a statin.    Regarding his elevated blood pressure, he is on metoprolol and amlodipine.  Continue the same.  Since he is off the ACE inhibitors, I did add very low-dose of diuretic in the form of HCTZ 12.5 mg once a day.  He apparently eats a lot of salt.  Especially at night he does eat potato chips.    Regarding his hypercholesterolemia, continue the Crestor and Zetia.  During his next blood test, I did advise him to check his electrolytes, liver function, lipids as well as the blood count.    Regarding the metabolic syndrome, I had a very long talk with him about his diet.  I talked to him about different diets and things to be avoided.  I did give him copy of Mediterranean diet.    Overall his cardiac status appears stable, I will see him back in 6 months or sooner if needed.                Electronically signed by Ronen Tracy MD March 12, 2020 15:17

## 2020-03-12 NOTE — PATIENT INSTRUCTIONS
Mediterranean Diet  A Mediterranean diet refers to food and lifestyle choices that are based on the traditions of countries located on the [place]. This way of eating has been shown to help prevent certain conditions and improve outcomes for people who have chronic diseases, like kidney disease and heart disease.  What are tips for following this plan?  Lifestyle  · Cook and eat meals together with your family, when possible.  · Drink enough fluid to keep your urine clear or pale yellow.  · Be physically active every day. This includes:  ? Aerobic exercise like running or swimming.  ? Leisure activities like gardening, walking, or housework.  · Get 7-8 hours of sleep each night.  · If recommended by your health care provider, drink red wine in moderation. This means 1 glass a day for nonpregnant women and 2 glasses a day for men. A glass of wine equals 5 oz (150 mL).  Reading food labels    · Check the serving size of packaged foods. For foods such as rice and pasta, the serving size refers to the amount of cooked product, not dry.  · Check the total fat in packaged foods. Avoid foods that have saturated fat or trans fats.  · Check the ingredients list for added sugars, such as corn syrup.  Shopping  · At the grocery store, buy most of your food from the areas near the walls of the store. This includes:  ? Fresh fruits and vegetables (produce).  ? Grains, beans, nuts, and seeds. Some of these may be available in unpackaged forms or large amounts (in bulk).  ? Fresh seafood.  ? Poultry and eggs.  ? Low-fat dairy products.  · Buy whole ingredients instead of prepackaged foods.  · Buy fresh fruits and vegetables in-season from local farmers markets.  · Buy frozen fruits and vegetables in resealable bags.  · If you do not have access to quality fresh seafood, buy precooked frozen shrimp or canned fish, such as tuna, salmon, or sardines.  · Buy small amounts of raw or cooked vegetables, salads, or olives from  the deli or salad bar at your store.  · Stock your pantry so you always have certain foods on hand, such as olive oil, canned tuna, canned tomatoes, rice, pasta, and beans.  Cooking  · Cook foods with extra-virgin olive oil instead of using butter or other vegetable oils.  · Have meat as a side dish, and have vegetables or grains as your main dish. This means having meat in small portions or adding small amounts of meat to foods like pasta or stew.  · Use beans or vegetables instead of meat in common dishes like chili or lasagna.  · [place] with different cooking methods. Try roasting or broiling vegetables instead of steaming or sautéeing them.  · Add frozen vegetables to soups, stews, pasta, or rice.  · Add nuts or seeds for added healthy fat at each meal. You can add these to yogurt, salads, or vegetable dishes.  · Marinate fish or vegetables using olive oil, lemon juice, garlic, and fresh herbs.  Meal planning    · Plan to eat 1 vegetarian meal one day each week. Try to work up to 2 vegetarian meals, if possible.  · Eat seafood 2 or more times a week.  · Have healthy snacks readily available, such as:  ? Vegetable sticks with hummus.  ? Greek yogurt.  ? Fruit and nut trail mix.  · Eat balanced meals throughout the week. This includes:  ? Fruit: 2-3 servings a day  ? Vegetables: 4-5 servings a day  ? Low-fat dairy: 2 servings a day  ? Fish, poultry, or lean meat: 1 serving a day  ? Beans and legumes: 2 or more servings a week  ? Nuts and seeds: 1-2 servings a day  ? Whole grains: 6-8 servings a day  ? Extra-virgin olive oil: 3-4 servings a day  · Limit red meat and sweets to only a few servings a month  What are my food choices?  · Mediterranean diet  ? Recommended  ? Grains: Whole-grain pasta. Brown rice. Bulgar wheat. Polenta. Couscous. Whole-wheat bread. Oatmeal. Quinoa.  ? Vegetables: Artichokes. Beets. Broccoli. Cabbage. Carrots. Eggplant. Green beans. Chard. Kale. Spinach. Onions. Leeks. Peas. Squash.  Tomatoes. Peppers. Radishes.  ? Fruits: Apples. Apricots. Avocado. Berries. Bananas. Cherries. Dates. Figs. Grapes. Get. Melon. Oranges. Peaches. Plums. Pomegranate.  ? Meats and other protein foods: Beans. Almonds. Sunflower seeds. Pine nuts. Peanuts. Cod. [place]. Scallops. Shrimp. Tuna. Tilapia. Clams. Oysters. Eggs.  ? Dairy: Low-fat milk. Cheese. Greek yogurt.  ? Beverages: Water. Red wine. Herbal tea.  ? Fats and oils: Extra virgin olive oil. Avocado oil. Grape seed oil.  ? Sweets and desserts: Greek yogurt with honey. Baked apples. Poached pears. Trail mix.  ? Seasoning and other foods: Basil. Cilantro. Coriander. Cumin. Mint. Parsley. Rhys. Rosemary. Tarragon. Garlic. Oregano. Thyme. Pepper. Balsalmic vinegar. Tahini. Hummus. Tomato sauce. Olives. Mushrooms.  ? Limit these  ? Grains: Prepackaged pasta or rice dishes. Prepackaged cereal with added sugar.  ? Vegetables: Deep fried potatoes (french fries).  ? Fruits: Fruit canned in syrup.  ? Meats and other protein foods: Beef. Pork. Lamb. Poultry with skin. Hot dogs. Patel.  ? Dairy: Ice cream. Sour cream. Whole milk.  ? Beverages: Juice. Sugar-sweetened soft drinks. Beer. Liquor and spirits.  ? Fats and oils: Butter. Canola oil. Vegetable oil. Beef fat (tallow). Lard.  ? Sweets and desserts: Cookies. Cakes. Pies. Candy.  ? Seasoning and other foods: Mayonnaise. Premade sauces and marinades.  ? The items listed may not be a complete list. Talk with your dietitian about what dietary choices are right for you.  Summary  · The Mediterranean diet includes both food and lifestyle choices.  · Eat a variety of fresh fruits and vegetables, beans, nuts, seeds, and whole grains.  · Limit the amount of red meat and sweets that you eat.  · Talk with your health care provider about whether it is safe for you to drink red wine in moderation. This means 1 glass a day for nonpregnant women and 2 glasses a day for men. A glass of wine equals 5 oz (150 mL).  This information  is not intended to replace advice given to you by your health care provider. Make sure you discuss any questions you have with your health care provider.  Document Released: 08/10/2017 Document Revised: 09/12/2017 Document Reviewed: 08/10/2017  [employer] Interactive Patient Education © 2020 [employer]

## 2020-09-22 ENCOUNTER — TELEPHONE (OUTPATIENT)
Dept: CARDIOLOGY | Facility: CLINIC | Age: 75
End: 2020-09-22

## 2020-09-22 NOTE — TELEPHONE ENCOUNTER
Patient called in asking if you could send a lab order for his labs to be done at [facility] lab prior to his next office visit .

## 2020-09-23 DIAGNOSIS — I10 ESSENTIAL HYPERTENSION: Primary | ICD-10-CM

## 2020-09-23 DIAGNOSIS — E03.9 HYPOTHYROIDISM, UNSPECIFIED TYPE: ICD-10-CM

## 2020-09-23 DIAGNOSIS — Z79.899 MEDICATION MANAGEMENT: ICD-10-CM

## 2020-09-23 DIAGNOSIS — E78.00 HYPERCHOLESTEREMIA: ICD-10-CM

## 2020-09-24 ENCOUNTER — APPOINTMENT (OUTPATIENT)
Dept: LAB | Facility: HOSPITAL | Age: 75
End: 2020-09-24

## 2020-09-29 ENCOUNTER — OFFICE VISIT (OUTPATIENT)
Dept: CARDIOLOGY | Facility: CLINIC | Age: 75
End: 2020-09-29

## 2020-09-29 VITALS
BODY MASS INDEX: 31.18 KG/M2 | WEIGHT: 217.8 LBS | HEIGHT: 70 IN | SYSTOLIC BLOOD PRESSURE: 140 MMHG | HEART RATE: 72 BPM | DIASTOLIC BLOOD PRESSURE: 70 MMHG | TEMPERATURE: 97 F

## 2020-09-29 DIAGNOSIS — R06.02 SHORTNESS OF BREATH: ICD-10-CM

## 2020-09-29 DIAGNOSIS — E78.00 HYPERCHOLESTEREMIA: ICD-10-CM

## 2020-09-29 DIAGNOSIS — I25.9 IHD (ISCHEMIC HEART DISEASE): Primary | ICD-10-CM

## 2020-09-29 DIAGNOSIS — I10 ESSENTIAL HYPERTENSION: ICD-10-CM

## 2020-09-29 PROCEDURE — 99213 OFFICE O/P EST LOW 20 MIN: CPT | Performed by: NURSE PRACTITIONER

## 2020-09-29 NOTE — PROGRESS NOTES
"Chief Complaint   Patient presents with   • Follow-up     Cardiac management .    • Labs     Last labs done 9-, results on chart   • Shortness of Breath     Reports he gets short of breath \" all the time \" ,. He adds that he had A CT scan on lung and 3 small nodules were found. He is following with PCP on this matter .   • Med Refill     No refills needed . Reviewed med list verbally       Subjective       Steve Santoyo is a 75 y.o. male  history of ischemic heart disease diagnosed in 2001 when he was found to have an occluded circumflex which was confirmed in 2002 also.  In 2003 found to have worsening of disease involving the left main and the LAD and underwent two-vessel bypass surgery.  In 2014 found that the vein graft to the obtuse marginal was occluded and RCA had new lesion for which he underwent stenting.  Since then he has undergone stress test twice once in 2016 other one is 2019 and both of them showed some changes in the inferior wall.  He did not have any more anginal symptoms have been managing him medically.    Today comes the office for follow-up visit.  He continues to have shortness of breath but denies chest discomfort, palpitations, dizziness, or edema.  No recent change in cardiac medications noted.  He has not had to use Nitrostat but states he does have it available if needed.  He is maintaining his normal daily activities only limited by lower back hip and leg pain.  According to patient he has had a recent CT scan that did show nodules with plan to repeat scan for monitoring.       Cardiac History:    Past Surgical History:   Procedure Laterality Date   • CARDIAC CATHETERIZATION  08/13/2001    100% LCX   • CARDIAC CATHETERIZATION  04/24/2002    100% LCX   • CARDIAC CATHETERIZATION  12/16/2003    50% LM, 70% LAD, 100% LCX   • CARDIAC CATHETERIZATION  06/24/2014    patent LIMA-LAD. 100% SVG-OM, 90% [id number] resolute   • CARDIOVASCULAR STRESS TEST  09/20/2007    7 min, 70% THR, " negative   • CARDIOVASCULAR STRESS TEST  12/01/2008    7 min, 30 sec., 68% THR. R/O lateral ischemia   • CARDIOVASCULAR STRESS TEST  04/12/2010    S. Echo- 7min, 30sec, 79% THR. Negative   • CARDIOVASCULAR STRESS TEST  04/13/2012    L. Myoview- small apical ischemia   • CARDIOVASCULAR STRESS TEST  02/10/2016    L. Myoview- Inferolateral Ischemia. EF > 65%   • CARDIOVASCULAR STRESS TEST  03/13/2019    L.Cardiolite- EF 66%. Inferolateral Ischemia.   • CORONARY ARTERY BYPASS GRAFT  12/18/2003    LIMA-LAD, SVG-OM.   • ECHO - CONVERTED  09/20/2007    EF >60%, Mild to mod MR.   • ECHO - CONVERTED  12/01/2008    EF>60%, Lateral WMA. RVSP- 42mmHg   • ECHO - CONVERTED  04/13/2012    EF 60-65%, aortic root 4.1cm   • ECHO - CONVERTED  07/01/2015    EF 65%   • ECHO - CONVERTED  02/10/2016    EF 60%   • ECHO - CONVERTED  03/13/2019    EF 65%. Mild MR. LA- 3.9 Cm   • OTHER SURGICAL HISTORY  04/14/2011    CT head- negative   • OTHER SURGICAL HISTORY  03/06/2017    BISHNU- no hemodynamically significant stenosis noted either side   • US CAROTID UNILATERAL  04/14/2011    no sig stenosis       Current Outpatient Medications   Medication Sig Dispense Refill   • amLODIPine (NORVASC) 2.5 MG tablet Take 1 tablet by mouth Daily. 90 tablet 3   • aspirin 81 MG EC tablet Take 1 tablet by mouth Daily. 30 tablet 3   • esomeprazole (nexIUM) 40 MG capsule Take 1 capsule by mouth Every Morning Before Breakfast. 30 capsule 3   • ezetimibe (ZETIA) 10 MG tablet Take 1 tablet by mouth Daily. 90 tablet 3   • hydroCHLOROthiazide (MICROZIDE) 12.5 MG capsule Take 1 capsule by mouth Daily. 90 capsule 3   • levothyroxine (SYNTHROID, LEVOTHROID) 100 MCG tablet Take 100 mcg by mouth Daily.     • lisinopril (PRINIVIL,ZESTRIL) 10 MG tablet Take 10 mg by mouth Daily.     • meclizine (ANTIVERT) 25 MG tablet Take 1 tablet by mouth 2 (Two) Times a Day. 180 tablet 3   • metoprolol tartrate (LOPRESSOR) 50 MG tablet Take 1 tablet by mouth Every 12 (Twelve) Hours. 180  tablet 3   • nitroglycerin (NITROSTAT) 0.4 MG SL tablet 1 under the tongue as needed for angina, may repeat q5mins for up three doses 25 tablet 1   • rosuvastatin (CRESTOR) 10 MG tablet Take 1 tablet by mouth Daily. 90 tablet 3   • sildenafil (VIAGRA) 50 MG tablet Take 1 tablet by mouth Daily As Needed for erectile dysfunction. Do not take with nitroglycerin or isosorbide 30 tablet 8     No current facility-administered medications for this visit.        Patient has no known allergies.    Past Medical History:   Diagnosis Date   • Arthritis    • GERD (gastroesophageal reflux disease)    • Hypercholesterolemia    • Hyperlipidemia    • Hypertension    • Hypothyroidism    • IHD (ischemic heart disease)    • PUD (peptic ulcer disease)        Social History     Socioeconomic History   • Marital status:      Spouse name: Not on file   • Number of children: Not on file   • Years of education: Not on file   • Highest education level: Not on file   Tobacco Use   • Smoking status: Former Smoker     Quit date:      Years since quittin.7   • Smokeless tobacco: Never Used   Substance and Sexual Activity   • Alcohol use: No   • Drug use: No       Family History   Problem Relation Age of Onset   • Heart attack Mother    • Heart disease Mother         stent placement    • Heart attack Father         multiple   • Lung disease Father    • Heart attack Brother    • Heart disease Brother         CABG at age 42       Review of Systems   Constitution: Negative for decreased appetite, diaphoresis and malaise/fatigue.   HENT: Negative for nosebleeds.    Eyes: Negative for blurred vision.   Cardiovascular: Positive for dyspnea on exertion. Negative for chest pain, claudication, cyanosis, irregular heartbeat, leg swelling, near-syncope, orthopnea, palpitations, paroxysmal nocturnal dyspnea and syncope.   Respiratory: Positive for shortness of breath.    Endocrine: Negative for cold intolerance and heat intolerance.  "  Hematologic/Lymphatic: Does not bruise/bleed easily.   Skin: Negative for rash.   Musculoskeletal: Positive for back pain, joint swelling and stiffness. Negative for falls and myalgias.   Gastrointestinal: Negative for heartburn, melena and nausea.   Genitourinary: Negative for dysuria and hematuria.   Neurological: Negative for dizziness, light-headedness and loss of balance.   Psychiatric/Behavioral: The patient does not have insomnia and is not nervous/anxious.         Objective      Labs 9/24/2020: CBC within normal limits, glucose 98, BUN 14, creatinine 1.3, sodium 140, potassium 5.2, CO2 26, calcium 9.6, total protein 7.3, albumin 4.6, AST 26, ALP 68, total bili 0.6, GFR 57 total cholesterol 147, triglycerides 93, LDL 80, HDL 48, TSH 2.3    /70 (BP Location: Left arm)   Pulse 72   Temp 97 °F (36.1 °C)   Ht 177.8 cm (70\")   Wt 98.8 kg (217 lb 12.8 oz)   BMI 31.25 kg/m²     Eyes:      Pupils: Pupils are equal, round, and reactive to light.   HENT:      Head: Normocephalic.   Neck:      Musculoskeletal: Normal range of motion.   Pulmonary:      Breath sounds: Normal breath sounds.   Cardiovascular:      Normal rate. Regular rhythm.   Edema:     Peripheral edema absent.   Abdominal:      General: Bowel sounds are normal.      Palpations: Abdomen is soft.   Musculoskeletal: Normal range of motion.   Skin:     General: Skin is warm.   Neurological:      Mental Status: Alert and oriented to person, place, and time.          Procedures : none today       Problem List Items Addressed This Visit        Cardiovascular and Mediastinum    IHD (ischemic heart disease) - Primary    Essential hypertension    Hypercholesteremia       Respiratory    Shortness of breath         IHD-last stress test in 2019 was similar to prior with small area of ischemia being managed medically.  Patient continues to have shortness of breath but denies being any worse over the last year.  He denies chest pain or palpitations.  " Continue aspirin, statin, blood pressure management.  He admits to having Nitrostat.  No medication refills needed at this time.  Should he have increased shortness of breath or develop other cardiac symptoms then repeat cardiac work-up will be advised including consider cardiac catheterization.    His blood pressure, heart rate, and heart rhythm are normal.  Continue Norvasc 2.5 daily, hydrochlorothiazide 12.5 mg daily, lisinopril 10 mg daily, and Lopressor 50 mg twice daily.    Hypercholesterolemia- on Crestor without side effects noted.  Recent lab report reviewed which showed lipids well controlled.  Continue Crestor at 10 mg daily.  Continue diet and exercise.    Patient's Body mass index is 31.25 kg/m². BMI is above normal parameters. Recommendations include: nutrition counseling. His weight is down 5 pounds.  He admits to recently monitoring caloric intake for benefit of weight loss.     A 6-month follow-up visit scheduled.  Please call sooner for cardiac concerns.         Electronically signed by MARTHA Ellsworth,  September 29, 2020 14:47 EDT

## 2020-10-29 RX ORDER — SILDENAFIL 50 MG/1
TABLET, FILM COATED ORAL
Qty: 10 TABLET | Refills: 6 | Status: SHIPPED | OUTPATIENT
Start: 2020-10-29 | End: 2021-11-08

## 2020-11-16 RX ORDER — MECLIZINE HYDROCHLORIDE 25 MG/1
TABLET ORAL
Qty: 180 TABLET | Refills: 0 | Status: SHIPPED | OUTPATIENT
Start: 2020-11-16 | End: 2023-01-05

## 2020-11-16 NOTE — TELEPHONE ENCOUNTER
Please inform patient gave refill for meclizine 180 days.  Given non-cardiac drug request PCP to take over management to ensure patient safety. Thank you.

## 2020-12-15 RX ORDER — MECLIZINE HYDROCHLORIDE 25 MG/1
TABLET ORAL
Qty: 180 TABLET | Refills: 0 | OUTPATIENT
Start: 2020-12-15

## 2020-12-23 RX ORDER — MECLIZINE HYDROCHLORIDE 25 MG/1
TABLET ORAL
Qty: 180 TABLET | Refills: 0 | OUTPATIENT
Start: 2020-12-23

## 2021-01-04 RX ORDER — MECLIZINE HYDROCHLORIDE 25 MG/1
TABLET ORAL
Qty: 180 TABLET | Refills: 0 | OUTPATIENT
Start: 2021-01-04

## 2021-01-29 RX ORDER — MECLIZINE HYDROCHLORIDE 25 MG/1
TABLET ORAL
Qty: 180 TABLET | Refills: 0 | OUTPATIENT
Start: 2021-01-29

## 2021-03-17 DIAGNOSIS — I10 ESSENTIAL HYPERTENSION: ICD-10-CM

## 2021-03-19 RX ORDER — AMLODIPINE BESYLATE 2.5 MG/1
TABLET ORAL
Qty: 90 TABLET | Refills: 3 | Status: SHIPPED | OUTPATIENT
Start: 2021-03-19 | End: 2021-03-30 | Stop reason: SDUPTHER

## 2021-03-30 ENCOUNTER — OFFICE VISIT (OUTPATIENT)
Dept: CARDIOLOGY | Facility: CLINIC | Age: 76
End: 2021-03-30

## 2021-03-30 VITALS
TEMPERATURE: 98.2 F | SYSTOLIC BLOOD PRESSURE: 130 MMHG | WEIGHT: 218 LBS | HEART RATE: 57 BPM | DIASTOLIC BLOOD PRESSURE: 82 MMHG | HEIGHT: 70 IN | BODY MASS INDEX: 31.21 KG/M2

## 2021-03-30 DIAGNOSIS — I10 ESSENTIAL HYPERTENSION: ICD-10-CM

## 2021-03-30 DIAGNOSIS — I25.9 IHD (ISCHEMIC HEART DISEASE): ICD-10-CM

## 2021-03-30 DIAGNOSIS — E03.9 ACQUIRED HYPOTHYROIDISM: Primary | ICD-10-CM

## 2021-03-30 DIAGNOSIS — R00.1 BRADYCARDIA, SINUS: ICD-10-CM

## 2021-03-30 DIAGNOSIS — E78.00 HYPERCHOLESTEREMIA: ICD-10-CM

## 2021-03-30 DIAGNOSIS — E66.9 OBESITY (BMI 30.0-34.9): ICD-10-CM

## 2021-03-30 PROCEDURE — 99214 OFFICE O/P EST MOD 30 MIN: CPT | Performed by: NURSE PRACTITIONER

## 2021-03-30 PROCEDURE — 93000 ELECTROCARDIOGRAM COMPLETE: CPT | Performed by: NURSE PRACTITIONER

## 2021-03-30 RX ORDER — METOPROLOL TARTRATE 50 MG/1
50 TABLET, FILM COATED ORAL EVERY 12 HOURS SCHEDULED
Qty: 180 TABLET | Refills: 3 | Status: SHIPPED | OUTPATIENT
Start: 2021-03-30 | End: 2021-10-25 | Stop reason: SDUPTHER

## 2021-03-30 RX ORDER — AMLODIPINE BESYLATE 2.5 MG/1
2.5 TABLET ORAL DAILY
Qty: 90 TABLET | Refills: 3 | Status: SHIPPED | OUTPATIENT
Start: 2021-03-30 | End: 2021-10-25 | Stop reason: SDUPTHER

## 2021-03-30 RX ORDER — HYDROCHLOROTHIAZIDE 12.5 MG/1
12.5 CAPSULE, GELATIN COATED ORAL DAILY
Qty: 90 CAPSULE | Refills: 3 | Status: SHIPPED | OUTPATIENT
Start: 2021-03-30 | End: 2021-10-25 | Stop reason: SDUPTHER

## 2021-03-30 RX ORDER — LISINOPRIL 10 MG/1
10 TABLET ORAL DAILY
Qty: 90 TABLET | Refills: 3 | Status: SHIPPED | OUTPATIENT
Start: 2021-03-30 | End: 2021-10-25 | Stop reason: SDUPTHER

## 2021-03-30 RX ORDER — ROSUVASTATIN CALCIUM 10 MG/1
10 TABLET, COATED ORAL DAILY
Qty: 90 TABLET | Refills: 3 | Status: SHIPPED | OUTPATIENT
Start: 2021-03-30 | End: 2021-10-25 | Stop reason: SDUPTHER

## 2021-03-30 RX ORDER — EZETIMIBE 10 MG/1
10 TABLET ORAL DAILY
Qty: 90 TABLET | Refills: 3 | Status: SHIPPED | OUTPATIENT
Start: 2021-03-30 | End: 2021-10-25 | Stop reason: SDUPTHER

## 2021-03-30 NOTE — PROGRESS NOTES
Chief Complaint   Patient presents with   • Follow-up     cardiac management. Updated EKG on file.   • Med Refill     will need cardiac meds refilled 90 days to [facility] in [place]. Brought med list with visit.   • Labs     last labs 1/21 with PCP. Not in chart.   • Aspirin     does take a daily ASA   • Shortness of Breath     on exertion.       Cardiac Complaints  dyspnea      Subjective   Steve Santoyo is a 75 y.o. 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  In 2003 found to have worsening of disease involving the left main and the LAD and underwent two-vessel bypass surgery.  In 2014 found that the vein graft to the obtuse marginal was occluded and RCA had new lesion for which he underwent stenting.  Since then he has undergone stress test twice once in 2016 other one is 2019 and both of them showed some changes in the inferior wall.  He did not have any more anginal symptoms have been managing him medically.      He comes today for follow up and new concerns are denied. He does admit to continued SOA but no worse than prior. Chest pain, palpitations, dizziness, edema, and syncope denied. He denies any use of SL NTG prn. Labs most recently done in Jan 2021, no current available. Refills requested.         Cardiac History  Past Surgical History:   Procedure Laterality Date   • CARDIAC CATHETERIZATION  08/13/2001    100% LCX   • CARDIAC CATHETERIZATION  04/24/2002    100% LCX   • CARDIAC CATHETERIZATION  12/16/2003    50% LM, 70% LAD, 100% LCX   • CARDIAC CATHETERIZATION  06/24/2014    patent LIMA-LAD. 100% SVG-OM, 90% RCA-3.0x26 resolute   • CARDIOVASCULAR STRESS TEST  09/20/2007    7 min, 70% THR, negative   • CARDIOVASCULAR STRESS TEST  12/01/2008    7 min, 30 sec., 68% THR. R/O lateral ischemia   • CARDIOVASCULAR STRESS TEST  04/12/2010    S. Echo- 7min, 30sec, 79% THR. Negative   • CARDIOVASCULAR STRESS TEST   04/13/2012    L. Myoview- small apical ischemia   • CARDIOVASCULAR STRESS TEST  02/10/2016    L. Myoview- Inferolateral Ischemia. EF > 65%   • CARDIOVASCULAR STRESS TEST  03/13/2019    L.Cardiolite- EF 66%. Inferolateral Ischemia.   • CORONARY ARTERY BYPASS GRAFT  12/18/2003    LIMA-LAD, SVG-OM.   • ECHO - CONVERTED  09/20/2007    EF >60%, Mild to mod MR.   • ECHO - CONVERTED  12/01/2008    EF>60%, Lateral WMA. RVSP- 42mmHg   • ECHO - CONVERTED  04/13/2012    EF 60-65%, aortic root 4.1cm   • ECHO - CONVERTED  07/01/2015    EF 65%   • ECHO - CONVERTED  02/10/2016    EF 60%   • ECHO - CONVERTED  03/13/2019    EF 65%. Mild MR. LA- 3.9 Cm   • OTHER SURGICAL HISTORY  04/14/2011    CT head- negative   • OTHER SURGICAL HISTORY  03/06/2017    BISHNU- no hemodynamically significant stenosis noted either side   • US CAROTID UNILATERAL  04/14/2011    no sig stenosis       Current Outpatient Medications   Medication Sig Dispense Refill   • amLODIPine (NORVASC) 2.5 MG tablet Take 1 tablet by mouth Daily. 90 tablet 3   • aspirin 81 MG EC tablet Take 1 tablet by mouth Daily. 30 tablet 3   • esomeprazole (nexIUM) 40 MG capsule Take 1 capsule by mouth Every Morning Before Breakfast. 30 capsule 3   • ezetimibe (ZETIA) 10 MG tablet Take 1 tablet by mouth Daily. 90 tablet 3   • hydroCHLOROthiazide (MICROZIDE) 12.5 MG capsule Take 1 capsule by mouth Daily. 90 capsule 3   • levothyroxine (SYNTHROID, LEVOTHROID) 100 MCG tablet Take 100 mcg by mouth Daily.     • lisinopril (PRINIVIL,ZESTRIL) 10 MG tablet Take 1 tablet by mouth Daily. 90 tablet 3   • meclizine (ANTIVERT) 25 MG tablet TAKE 1 TABLET BY MOUTH TWICE A  tablet 0   • metoprolol tartrate (LOPRESSOR) 50 MG tablet Take 1 tablet by mouth Every 12 (Twelve) Hours. 180 tablet 3   • nitroglycerin (NITROSTAT) 0.4 MG SL tablet 1 under the tongue as needed for angina, may repeat q5mins for up three doses 25 tablet 1   • rosuvastatin (CRESTOR) 10 MG tablet Take 1 tablet by mouth Daily.  90 tablet 3   • sildenafil (VIAGRA) 50 MG tablet TAKE 1 TAB ORALLY DAILY AS NEEDED FOR ERECTILE DYSFUNCTION. DONT TAKE W/ NITROGLYCERIN OR ISOSORBIDE 10 tablet 6     No current facility-administered medications for this visit.       Patient has no known allergies.    Past Medical History:   Diagnosis Date   • Arthritis    • GERD (gastroesophageal reflux disease)    • Hypercholesterolemia    • Hyperlipidemia    • Hypertension    • Hypothyroidism    • IHD (ischemic heart disease)    • PUD (peptic ulcer disease)        Social History     Socioeconomic History   • Marital status:      Spouse name: Not on file   • Number of children: Not on file   • Years of education: Not on file   • Highest education level: Not on file   Tobacco Use   • Smoking status: Former Smoker     Quit date:      Years since quittin.2   • Smokeless tobacco: Never Used   Vaping Use   • Vaping Use: Never used   Substance and Sexual Activity   • Alcohol use: No   • Drug use: No       Family History   Problem Relation Age of Onset   • Heart attack Mother    • Heart disease Mother         stent placement    • Heart attack Father         multiple   • Lung disease Father    • Heart attack Brother    • Heart disease Brother         CABG at age 42       Review of Systems   Constitutional: Negative for malaise/fatigue and night sweats.   Cardiovascular: Positive for dyspnea on exertion. Negative for chest pain, irregular heartbeat, leg swelling, near-syncope, orthopnea, palpitations and syncope.   Respiratory: Positive for shortness of breath. Negative for cough and wheezing.    Musculoskeletal: Negative for back pain, joint pain, joint swelling and stiffness.   Gastrointestinal: Negative for anorexia, heartburn, melena, nausea and vomiting.   Genitourinary: Negative for dysuria, hematuria, hesitancy and nocturia.   Neurological: Negative for dizziness, light-headedness and loss of balance.   Psychiatric/Behavioral: Negative for depression  "and memory loss. The patient is not nervous/anxious.            Objective     /82 (BP Location: Right arm)   Pulse 57   Temp 98.2 °F (36.8 °C)   Ht 177.8 cm (70\")   Wt 98.9 kg (218 lb)   BMI 31.28 kg/m²     Constitutional:       Appearance: Healthy appearance. Not in distress.   Eyes:      Pupils: Pupils are equal, round, and reactive to light.   HENT:      Nose: Nose normal.   Pulmonary:      Effort: Pulmonary effort is normal.      Breath sounds: Normal breath sounds.   Cardiovascular:      PMI at left midclavicular line. Normal rate. Regular rhythm.      Murmurs: There is a systolic murmur.   Abdominal:      Palpations: Abdomen is soft.   Musculoskeletal: Normal range of motion.      Cervical back: Normal range of motion and neck supple. Skin:     General: Skin is warm and dry.   Neurological:      Mental Status: Oriented to person, place and time.           ECG 12 Lead    Date/Time: 3/30/2021 12:39 PM  Performed by: Sylwia Syed APRN  Authorized by: Sylwia Syed APRN   Comparison: compared with previous ECG from 4/12/2012  Similar to previous ECG  Comparison to previous ECG: Rate slightly lower today  Rhythm: sinus bradycardia  BPM: 57  Conduction: 1st degree AV block            Assessment/Plan     IHD:  Status stable. No new concerns voiced. Chest pain remains well managed. ASA therapy continued at current, bleeding and bruising denied. No repeat workup will be recommended.    HTN:  Blood pressure stable. No adjustment to current norvasc, microzide, lisinopril, and lopressor therapy.  Limited sodium advised.     Bradycardia:  EKG done today in regards shows a sinus jayro with normal QT, no acute ST changes.      Hyperlipidemia:  On statin therapy with crestor and zetia therapy without concerns. FLP managed with your office. Can we have for review?      Hypothyroid:  On synthroid therapy without concerns. TSH followed by your office. Can we have for review?    BMI noted at 31.28, good " cardiac diet with limited carbs, calories, and activity as tolerated advised.     Refills per request    6 month follow up recommended or sooner if needed.       Problems Addressed this Visit        Cardiac and Vasculature    IHD (ischemic heart disease)    Relevant Medications    amLODIPine (NORVASC) 2.5 MG tablet    metoprolol tartrate (LOPRESSOR) 50 MG tablet    Essential hypertension    Relevant Medications    amLODIPine (NORVASC) 2.5 MG tablet    hydroCHLOROthiazide (MICROZIDE) 12.5 MG capsule    lisinopril (PRINIVIL,ZESTRIL) 10 MG tablet    metoprolol tartrate (LOPRESSOR) 50 MG tablet    Hypercholesteremia    Relevant Medications    ezetimibe (ZETIA) 10 MG tablet    rosuvastatin (CRESTOR) 10 MG tablet       Endocrine and Metabolic    Hypothyroidism - Primary    Relevant Medications    metoprolol tartrate (LOPRESSOR) 50 MG tablet      Other Visit Diagnoses     Bradycardia, sinus        Relevant Medications    amLODIPine (NORVASC) 2.5 MG tablet    metoprolol tartrate (LOPRESSOR) 50 MG tablet    Other Relevant Orders    ECG 12 Lead    Obesity (BMI 30.0-34.9)          Diagnoses       Codes Comments    Acquired hypothyroidism    -  Primary ICD-10-CM: E03.9  ICD-9-CM: 244.9     IHD (ischemic heart disease)     ICD-10-CM: I25.9  ICD-9-CM: 414.9     Essential hypertension     ICD-10-CM: I10  ICD-9-CM: 401.9     Bradycardia, sinus     ICD-10-CM: R00.1  ICD-9-CM: 427.89     Hypercholesteremia     ICD-10-CM: E78.00  ICD-9-CM: 272.0     Obesity (BMI 30.0-34.9)     ICD-10-CM: E66.9  ICD-9-CM: 278.00           Patient's Body mass index is 31.28 kg/m². BMI is above normal parameters. Recommendations include: nutrition counseling.              Electronically signed by MARTHA Adames March 30, 2021 15:12 EDT

## 2021-10-25 ENCOUNTER — OFFICE VISIT (OUTPATIENT)
Dept: CARDIOLOGY | Facility: CLINIC | Age: 76
End: 2021-10-25

## 2021-10-25 VITALS
TEMPERATURE: 97.3 F | WEIGHT: 219.6 LBS | HEIGHT: 70 IN | HEART RATE: 64 BPM | BODY MASS INDEX: 31.44 KG/M2 | DIASTOLIC BLOOD PRESSURE: 80 MMHG | SYSTOLIC BLOOD PRESSURE: 164 MMHG

## 2021-10-25 DIAGNOSIS — E66.2 CLASS 1 OBESITY WITH ALVEOLAR HYPOVENTILATION, SERIOUS COMORBIDITY, AND BODY MASS INDEX (BMI) OF 30.0 TO 30.9 IN ADULT (HCC): ICD-10-CM

## 2021-10-25 DIAGNOSIS — E78.00 HYPERCHOLESTEREMIA: ICD-10-CM

## 2021-10-25 DIAGNOSIS — R06.02 SHORTNESS OF BREATH: Primary | ICD-10-CM

## 2021-10-25 DIAGNOSIS — M25.50 MULTIPLE JOINT PAIN: ICD-10-CM

## 2021-10-25 DIAGNOSIS — I25.9 IHD (ISCHEMIC HEART DISEASE): ICD-10-CM

## 2021-10-25 DIAGNOSIS — I10 ESSENTIAL HYPERTENSION: ICD-10-CM

## 2021-10-25 PROCEDURE — 99214 OFFICE O/P EST MOD 30 MIN: CPT | Performed by: NURSE PRACTITIONER

## 2021-10-25 RX ORDER — AMLODIPINE BESYLATE 2.5 MG/1
2.5 TABLET ORAL DAILY
Qty: 90 TABLET | Refills: 3 | Status: SHIPPED | OUTPATIENT
Start: 2021-10-25 | End: 2022-05-17 | Stop reason: SDUPTHER

## 2021-10-25 RX ORDER — EZETIMIBE 10 MG/1
10 TABLET ORAL DAILY
Qty: 90 TABLET | Refills: 3 | Status: SHIPPED | OUTPATIENT
Start: 2021-10-25 | End: 2023-01-05 | Stop reason: SDUPTHER

## 2021-10-25 RX ORDER — METOPROLOL TARTRATE 50 MG/1
50 TABLET, FILM COATED ORAL EVERY 12 HOURS SCHEDULED
Qty: 180 TABLET | Refills: 3 | Status: SHIPPED | OUTPATIENT
Start: 2021-10-25 | End: 2022-05-17

## 2021-10-25 RX ORDER — LISINOPRIL 10 MG/1
10 TABLET ORAL DAILY
Qty: 90 TABLET | Refills: 3 | Status: SHIPPED | OUTPATIENT
Start: 2021-10-25 | End: 2022-05-17 | Stop reason: ALTCHOICE

## 2021-10-25 RX ORDER — HYDROCHLOROTHIAZIDE 12.5 MG/1
12.5 CAPSULE, GELATIN COATED ORAL DAILY
Qty: 90 CAPSULE | Refills: 3 | Status: SHIPPED | OUTPATIENT
Start: 2021-10-25 | End: 2022-05-17 | Stop reason: SDDI

## 2021-10-25 RX ORDER — ROSUVASTATIN CALCIUM 10 MG/1
10 TABLET, COATED ORAL DAILY
Qty: 90 TABLET | Refills: 3 | Status: SHIPPED | OUTPATIENT
Start: 2021-10-25 | End: 2023-01-05 | Stop reason: SDUPTHER

## 2021-10-25 NOTE — PROGRESS NOTES
Chief Complaint   Patient presents with   • Follow-up     Pt here for cardiac follow up.  Pt denies CP, dizziness or palpitations.  He does admit SOB with activity, but states he is able to recover quickly if he sits down.  Pt takes a daily ASA.   • Med Refill     Pt needs refills on cardiac meds.  He requests 90 day refills.    • Lab Work     Last labs were on 8/9/21 with his PCP- they are in his chart.       Cardiac Complaints  dyspnea      Subjective   Steve Santoyo is a 76 y.o. 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  In 2003 found to have worsening of disease involving the left main and the LAD and underwent two-vessel bypass surgery.  In 2014 found that the vein graft to the obtuse marginal was occluded and RCA had new lesion for which he underwent stenting.  Since then he has undergone stress test twice once in 2016 other one is 2019 and both of them showed some changes in the inferior wall.  He did not have any more anginal symptoms have been managing him medically.       He comes today for follow up and new concerns are denied. He does admit to continued SOA, but no worse than prior. Patient states with rest the symptoms of breathlessness quickly resolve. Chest pain, palpitations, dizziness, edema, and syncope denied. He denies any use of SL NTG prn. Labs done August 2021 and showed: HH 14.2/44.5, BUN 17, Creatinine 1.3, Na 141, K 5.2, AST 27, ALT 22, GFR 57, TRIG 107, HDL 52, LDL 72, TSH 2.34.  Refills requested.         Cardiac History  Past Surgical History:   Procedure Laterality Date   • CARDIAC CATHETERIZATION  08/13/2001    100% LCX   • CARDIAC CATHETERIZATION  04/24/2002    100% LCX   • CARDIAC CATHETERIZATION  12/16/2003    50% LM, 70% LAD, 100% LCX   • CARDIAC CATHETERIZATION  06/24/2014    patent LIMA-LAD. 100% SVG-OM, 90% RCA-3.0x26 resolute   • CARDIOVASCULAR STRESS TEST  09/20/2007    7 min,  70% THR, negative   • CARDIOVASCULAR STRESS TEST  12/01/2008    7 min, 30 sec., 68% THR. R/O lateral ischemia   • CARDIOVASCULAR STRESS TEST  04/12/2010    S. Echo- 7min, 30sec, 79% THR. Negative   • CARDIOVASCULAR STRESS TEST  04/13/2012    L. Myoview- small apical ischemia   • CARDIOVASCULAR STRESS TEST  02/10/2016    L. Myoview- Inferolateral Ischemia. EF > 65%   • CARDIOVASCULAR STRESS TEST  03/13/2019    L.Cardiolite- EF 66%. Inferolateral Ischemia.   • CORONARY ARTERY BYPASS GRAFT  12/18/2003    LIMA-LAD, SVG-OM.   • ECHO - CONVERTED  09/20/2007    EF >60%, Mild to mod MR.   • ECHO - CONVERTED  12/01/2008    EF>60%, Lateral WMA. RVSP- 42mmHg   • ECHO - CONVERTED  04/13/2012    EF 60-65%, aortic root 4.1cm   • ECHO - CONVERTED  07/01/2015    EF 65%   • ECHO - CONVERTED  02/10/2016    EF 60%   • ECHO - CONVERTED  03/13/2019    EF 65%. Mild MR. LA- 3.9 Cm   • OTHER SURGICAL HISTORY  04/14/2011    CT head- negative   • OTHER SURGICAL HISTORY  03/06/2017    BISHNU- no hemodynamically significant stenosis noted either side   • US CAROTID UNILATERAL  04/14/2011    no sig stenosis       Current Outpatient Medications   Medication Sig Dispense Refill   • amLODIPine (NORVASC) 2.5 MG tablet Take 1 tablet by mouth Daily. 90 tablet 3   • aspirin 81 MG EC tablet Take 1 tablet by mouth Daily. 30 tablet 3   • esomeprazole (nexIUM) 40 MG capsule Take 1 capsule by mouth Every Morning Before Breakfast. 30 capsule 3   • ezetimibe (ZETIA) 10 MG tablet Take 1 tablet by mouth Daily. 90 tablet 3   • levothyroxine (SYNTHROID, LEVOTHROID) 100 MCG tablet Take 100 mcg by mouth Daily.     • metoprolol tartrate (LOPRESSOR) 50 MG tablet Take 1 tablet by mouth Every 12 (Twelve) Hours. 180 tablet 3   • nitroglycerin (NITROSTAT) 0.4 MG SL tablet 1 under the tongue as needed for angina, may repeat q5mins for up three doses 25 tablet 1   • rosuvastatin (CRESTOR) 10 MG tablet Take 1 tablet by mouth Daily. 90 tablet 3   • sildenafil (VIAGRA) 50 MG  tablet TAKE 1 TAB ORALLY DAILY AS NEEDED FOR ERECTILE DYSFUNCTION. DONT TAKE W/ NITROGLYCERIN OR ISOSORBIDE 10 tablet 6   • hydroCHLOROthiazide (MICROZIDE) 12.5 MG capsule Take 1 capsule by mouth Daily. 90 capsule 3   • lisinopril (PRINIVIL,ZESTRIL) 10 MG tablet Take 1 tablet by mouth Daily. 90 tablet 3   • meclizine (ANTIVERT) 25 MG tablet TAKE 1 TABLET BY MOUTH TWICE A  tablet 0     No current facility-administered medications for this visit.       Patient has no known allergies.    Past Medical History:   Diagnosis Date   • Arthritis    • GERD (gastroesophageal reflux disease)    • Hypercholesterolemia    • Hyperlipidemia    • Hypertension    • Hypothyroidism    • IHD (ischemic heart disease)    • PUD (peptic ulcer disease)        Social History     Socioeconomic History   • Marital status:    Tobacco Use   • Smoking status: Former Smoker     Quit date:      Years since quittin.8   • Smokeless tobacco: Never Used   Vaping Use   • Vaping Use: Never used   Substance and Sexual Activity   • Alcohol use: No   • Drug use: No       Family History   Problem Relation Age of Onset   • Heart attack Mother    • Heart disease Mother         stent placement    • Heart attack Father         multiple   • Lung disease Father    • Heart attack Brother    • Heart disease Brother         CABG at age 42       Review of Systems   Constitutional: Negative for malaise/fatigue and night sweats.   Cardiovascular: Positive for dyspnea on exertion. Negative for chest pain, claudication, irregular heartbeat, leg swelling, near-syncope, orthopnea, palpitations and syncope.   Respiratory: Positive for shortness of breath. Negative for cough and wheezing.    Musculoskeletal: Positive for stiffness. Negative for back pain and joint pain.   Gastrointestinal: Negative for anorexia, heartburn, melena, nausea and vomiting.   Genitourinary: Negative for dysuria, hematuria, hesitancy and nocturia.   Neurological: Negative for  "dizziness, headaches, light-headedness and loss of balance.   Psychiatric/Behavioral: Negative for depression and memory loss. The patient is not nervous/anxious.            Objective     /80 (BP Location: Right arm, Patient Position: Sitting)   Pulse 64   Temp 97.3 °F (36.3 °C)   Ht 177.8 cm (70\")   Wt 99.6 kg (219 lb 9.6 oz)   BMI 31.51 kg/m²     Constitutional:       Appearance: Not in distress.   Eyes:      Pupils: Pupils are equal, round, and reactive to light.   HENT:      Nose: Nose normal.   Pulmonary:      Effort: Pulmonary effort is normal.      Breath sounds: Normal breath sounds.   Cardiovascular:      PMI at left midclavicular line. Normal rate. Regular rhythm.      Murmurs: There is a systolic murmur.   Abdominal:      Palpations: Abdomen is soft.   Musculoskeletal: Normal range of motion.      Cervical back: Neck supple. Skin:     General: Skin is warm and dry.   Neurological:      Mental Status: Oriented to person, place and time.         Procedures    Assessment/Plan     IHD:  Status stable. No new concerns voiced. Chest pain remains well managed. ASA therapy continued at current, bleeding and bruising denied. No repeat workup will be recommended.     HTN:  Blood pressure elevated, but after recheck, BP noted at 132/81.  He does think much of it is pain from his right knee and hip pain.  No adjustment to current norvasc, microzide, lisinopril, and lopressor therapy.  For now, current continued. Limited sodium advised.      Hyperlipidemia:  On statin therapy with crestor and zetia therapy without concerns. FLP managed with your office. Most recent showed lipids at goal.     Hypothyroid:  On synthroid therapy without concerns, HR at goal.  TSH showed hypothyroidism at goal.      BMI noted at 31.28, good cardiac diet with limited carbs, calories, and activity as tolerated advised.      Refills per request     6 month follow up recommended or sooner if needed.       Problems Addressed this " Visit        Cardiac and Vasculature    IHD (ischemic heart disease)    Relevant Medications    metoprolol tartrate (LOPRESSOR) 50 MG tablet    amLODIPine (NORVASC) 2.5 MG tablet    Essential hypertension    Relevant Medications    metoprolol tartrate (LOPRESSOR) 50 MG tablet    lisinopril (PRINIVIL,ZESTRIL) 10 MG tablet    hydroCHLOROthiazide (MICROZIDE) 12.5 MG capsule    amLODIPine (NORVASC) 2.5 MG tablet    Hypercholesteremia    Relevant Medications    rosuvastatin (CRESTOR) 10 MG tablet    ezetimibe (ZETIA) 10 MG tablet       Pulmonary and Pneumonias    Shortness of breath - Primary      Other Visit Diagnoses     Multiple joint pain        Class 1 obesity with alveolar hypoventilation, serious comorbidity, and body mass index (BMI) of 30.0 to 30.9 in adult ([facility])          Diagnoses       Codes Comments    Shortness of breath    -  Primary ICD-10-CM: R06.02  ICD-9-CM: 786.05     IHD (ischemic heart disease)     ICD-10-CM: I25.9  ICD-9-CM: 414.9     Essential hypertension     ICD-10-CM: I10  ICD-9-CM: 401.9     Hypercholesteremia     ICD-10-CM: E78.00  ICD-9-CM: 272.0     Multiple joint pain     ICD-10-CM: M25.50  ICD-9-CM: 719.49     Class 1 obesity with alveolar hypoventilation, serious comorbidity, and body mass index (BMI) of 30.0 to 30.9 in adult ([facility])     ICD-10-CM: E66.2, Z68.30  ICD-9-CM: 278.03, V85.30           Patient's Body mass index is 31.51 kg/m². indicating that he is obese. Good cardiac diet with limited carbs, calories, and activity as tolerated.                  Electronically signed by MARTHA Adames October 25, 2021 16:59 EDT

## 2021-11-08 RX ORDER — SILDENAFIL 50 MG/1
TABLET, FILM COATED ORAL
Qty: 10 TABLET | Refills: 6 | Status: SHIPPED | OUTPATIENT
Start: 2021-11-08 | End: 2023-01-05

## 2022-05-17 ENCOUNTER — OFFICE VISIT (OUTPATIENT)
Dept: CARDIOLOGY | Facility: CLINIC | Age: 77
End: 2022-05-17

## 2022-05-17 VITALS
BODY MASS INDEX: 31.21 KG/M2 | HEART RATE: 59 BPM | WEIGHT: 218 LBS | DIASTOLIC BLOOD PRESSURE: 94 MMHG | OXYGEN SATURATION: 98 % | SYSTOLIC BLOOD PRESSURE: 164 MMHG | HEIGHT: 70 IN

## 2022-05-17 DIAGNOSIS — R06.02 SHORTNESS OF BREATH: ICD-10-CM

## 2022-05-17 DIAGNOSIS — R42 DIZZINESS: ICD-10-CM

## 2022-05-17 DIAGNOSIS — I25.9 IHD (ISCHEMIC HEART DISEASE): Primary | ICD-10-CM

## 2022-05-17 DIAGNOSIS — I10 PRIMARY HYPERTENSION: ICD-10-CM

## 2022-05-17 DIAGNOSIS — I10 ESSENTIAL HYPERTENSION: ICD-10-CM

## 2022-05-17 DIAGNOSIS — I20.8 STABLE ANGINA PECTORIS: ICD-10-CM

## 2022-05-17 DIAGNOSIS — E78.00 HYPERCHOLESTEREMIA: ICD-10-CM

## 2022-05-17 PROCEDURE — 99214 OFFICE O/P EST MOD 30 MIN: CPT | Performed by: NURSE PRACTITIONER

## 2022-05-17 RX ORDER — DICYCLOMINE HYDROCHLORIDE 10 MG/1
10 CAPSULE ORAL 2 TIMES DAILY
Qty: 60 CAPSULE | Refills: 3 | Status: SHIPPED | OUTPATIENT
Start: 2022-05-17 | End: 2022-08-15

## 2022-05-17 RX ORDER — TAMSULOSIN HYDROCHLORIDE 0.4 MG/1
1 CAPSULE ORAL DAILY
COMMUNITY
End: 2023-01-05

## 2022-05-17 RX ORDER — AMLODIPINE BESYLATE 5 MG/1
TABLET ORAL
Qty: 90 TABLET | Refills: 3 | Status: SHIPPED | OUTPATIENT
Start: 2022-05-17 | End: 2022-07-07 | Stop reason: SDUPTHER

## 2022-05-17 NOTE — PROGRESS NOTES
Chief Complaint   Patient presents with   • Follow-up     Cardiac management-Patient reports occasional chest pain, dizziness when his blood pressure is elevated, and SOA with exertion. Patient denies palpitations.   • Med Refill     Patient brought medication list with him today. Verified all current medications. No refills needed at this time.   • Labs     Patient states he had labs with PCP in 02/2022, but he is not positive on this.      Subjective       Steve Santoyo is a 76 y.o. male with HTN, hyperlipidemia, and IHD diagnosed in 2001 when he was found to have an occluded circumflex which was confirmed in 2002 also.  In 2003 found to have worsening of disease involving the left main and the LAD and underwent two-vessel bypass surgery.  In 2014 found that the vein graft to the obtuse marginal was occluded and RCA had new lesion for which he underwent stenting.  Since then he has undergone stress test twice once in 2016 other one is 2019 and both of them showed some changes in the inferior wall.  He did not have any more anginal symptoms have been managing him medically.      He returns today for follow up. He admits to increasing midsternal chest pressure and SOA more with exertion, relieved with rest. He has consistently elevated blood pressure. He associates dizziness when bp high. He brought a note from Dr. Stinson recommending reducing beta blocker dose to help with dizziness. He does have bradycardia often. He admits to some amount of reflux, fullness in chest after eating.  Labs 8/9/2021 normal CBC, Cr 1.3, K 5.2, HDL 52, LDL 72, TSH 2.3.          Cardiac History:    Past Surgical History:   Procedure Laterality Date   • CARDIAC CATHETERIZATION  08/13/2001    100% LCX   • CARDIAC CATHETERIZATION  04/24/2002    100% LCX   • CARDIAC CATHETERIZATION  12/16/2003    50% LM, 70% LAD, 100% LCX   • CARDIAC CATHETERIZATION  06/24/2014    patent LIMA-LAD. 100% SVG-OM, 90% RCA-3.0x26 resolute   • CARDIOVASCULAR STRESS  TEST  09/20/2007    7 min, 70% THR, negative   • CARDIOVASCULAR STRESS TEST  12/01/2008    7 min, 30 sec., 68% THR. R/O lateral ischemia   • CARDIOVASCULAR STRESS TEST  04/12/2010    S. Echo- 7min, 30sec, 79% THR. Negative   • CARDIOVASCULAR STRESS TEST  04/13/2012    L. Myoview- small apical ischemia   • CARDIOVASCULAR STRESS TEST  02/10/2016    L. Myoview- Inferolateral Ischemia. EF > 65%   • CARDIOVASCULAR STRESS TEST  03/13/2019    L.Cardiolite- EF 66%. Inferolateral Ischemia.   • CORONARY ARTERY BYPASS GRAFT  12/18/2003    LIMA-LAD, SVG-OM.   • ECHO - CONVERTED  09/20/2007    EF >60%, Mild to mod MR.   • ECHO - CONVERTED  12/01/2008    EF>60%, Lateral WMA. RVSP- 42mmHg   • ECHO - CONVERTED  04/13/2012    EF 60-65%, aortic root 4.1cm   • ECHO - CONVERTED  07/01/2015    EF 65%   • ECHO - CONVERTED  02/10/2016    EF 60%   • ECHO - CONVERTED  03/13/2019    EF 65%. Mild MR. LA- 3.9 Cm   • OTHER SURGICAL HISTORY  04/14/2011    CT head- negative   • OTHER SURGICAL HISTORY  03/06/2017    BISHNU- no hemodynamically significant stenosis noted either side   • US CAROTID UNILATERAL  04/14/2011    no sig stenosis     Current Outpatient Medications   Medication Sig Dispense Refill   • amLODIPine (NORVASC) 2.5 MG tablet Take 1 tablet by mouth Daily. 90 tablet 3   • aspirin 81 MG EC tablet Take 1 tablet by mouth Daily. 30 tablet 3   • esomeprazole (nexIUM) 40 MG capsule Take 1 capsule by mouth Every Morning Before Breakfast. 30 capsule 3   • ezetimibe (ZETIA) 10 MG tablet Take 1 tablet by mouth Daily. 90 tablet 3   • levothyroxine (SYNTHROID, LEVOTHROID) 100 MCG tablet Take 100 mcg by mouth Daily.     • meclizine (ANTIVERT) 25 MG tablet TAKE 1 TABLET BY MOUTH TWICE A  tablet 0   • metoprolol tartrate (LOPRESSOR) 50 MG tablet Take 1 tablet by mouth Every 12 (Twelve) Hours. 180 tablet 3   • nitroglycerin (NITROSTAT) 0.4 MG SL tablet 1 under the tongue as needed for angina, may repeat q5mins for up three doses 25 tablet 1   •  rosuvastatin (CRESTOR) 10 MG tablet Take 1 tablet by mouth Daily. 90 tablet 3   • sildenafil (VIAGRA) 50 MG tablet TAKE 1 TAB ORALLY DAILY AS NEEDED FOR ERECTILE DYSFUNCTION. DONT TAKE W/ NITROGLYCERIN OR ISOSORBIDE 10 tablet 6   • tamsulosin (FLOMAX) 0.4 MG capsule 24 hr capsule Take 1 capsule by mouth Daily.     • hydroCHLOROthiazide (MICROZIDE) 12.5 MG capsule Take 1 capsule by mouth Daily. 90 capsule 3     No current facility-administered medications for this visit.     Patient has no known allergies.    Past Medical History:   Diagnosis Date   • Arthritis    • GERD (gastroesophageal reflux disease)    • Hypercholesterolemia    • Hyperlipidemia    • Hypertension    • Hypothyroidism    • IHD (ischemic heart disease)    • PUD (peptic ulcer disease)      Social History     Socioeconomic History   • Marital status:    Tobacco Use   • Smoking status: Former Smoker     Quit date:      Years since quittin.3   • Smokeless tobacco: Never Used   Vaping Use   • Vaping Use: Never used   Substance and Sexual Activity   • Alcohol use: No   • Drug use: No     Family History   Problem Relation Age of Onset   • Heart attack Mother    • Heart disease Mother         stent placement    • Heart attack Father         multiple   • Lung disease Father    • Heart attack Brother    • Heart disease Brother         CABG at age 42     Review of Systems   Constitutional: Positive for weight loss (-1). Negative for decreased appetite and malaise/fatigue.   HENT: Negative.    Eyes: Negative for blurred vision.   Cardiovascular: Positive for chest pain and dyspnea on exertion. Negative for leg swelling, palpitations and syncope.   Respiratory: Positive for shortness of breath. Negative for sleep disturbances due to breathing.    Endocrine: Negative.    Hematologic/Lymphatic: Negative for bleeding problem. Does not bruise/bleed easily.   Skin: Negative.    Musculoskeletal: Negative for falls and myalgias.   Gastrointestinal:  "Negative for abdominal pain, heartburn and melena.   Genitourinary: Negative for hematuria.   Neurological: Positive for dizziness. Negative for light-headedness.   Psychiatric/Behavioral: Negative for altered mental status.   Allergic/Immunologic: Negative.       Objective     /94 (BP Location: Right arm, Patient Position: Sitting, Cuff Size: Adult)   Pulse 59   Ht 177.8 cm (70\")   Wt 98.9 kg (218 lb)   SpO2 98%   BMI 31.28 kg/m²     Vitals and nursing note reviewed.   Constitutional:       General: Not in acute distress.     Appearance: Well-developed. Not diaphoretic.   Eyes:      Pupils: Pupils are equal, round, and reactive to light.   HENT:      Head: Normocephalic.   Pulmonary:      Effort: Pulmonary effort is normal. No respiratory distress.      Breath sounds: Normal breath sounds.   Cardiovascular:      Bradycardia present. Regular rhythm.      Murmurs: There is a grade 2/6 systolic murmur.   Pulses:     Intact distal pulses.   Abdominal:      General: Bowel sounds are normal.      Palpations: Abdomen is soft.   Musculoskeletal: Normal range of motion.      Cervical back: Normal range of motion. Skin:     General: Skin is warm and dry.   Neurological:      Mental Status: Alert and oriented to person, place, and time.        Procedures          Problem List Items Addressed This Visit        Cardiac and Vasculature    IHD (ischemic heart disease) - Primary    Relevant Orders    Stress Test With Myocardial Perfusion One Day    Adult Transthoracic Echo Complete W/ Cont if Necessary Per Protocol    US Carotid Bilateral    Hypercholesteremia    Relevant Orders    Stress Test With Myocardial Perfusion One Day    Adult Transthoracic Echo Complete W/ Cont if Necessary Per Protocol    US Carotid Bilateral       Pulmonary and Pneumonias    Shortness of breath    Relevant Orders    Stress Test With Myocardial Perfusion One Day    Adult Transthoracic Echo Complete W/ Cont if Necessary Per Protocol    US " Carotid Bilateral      Other Visit Diagnoses     Dizziness        Relevant Orders    US Carotid Bilateral    Stable angina pectoris (HCC)        Relevant Orders    Stress Test With Myocardial Perfusion One Day    Adult Transthoracic Echo Complete W/ Cont if Necessary Per Protocol    Primary hypertension        Relevant Orders    Stress Test With Myocardial Perfusion One Day    Adult Transthoracic Echo Complete W/ Cont if Necessary Per Protocol         1. CAD- s/p bypass, stenting RCA in 2014, stress in 2019 inferolateral ischemia. Repeat nuclear stress to evaluate ischemic burden with increasing chest discomfort. He was given a prescription for Bentyl to see if relieves GI symptomology. SL nitro PRN.     2. HTN- elevated. Appears lisinopril and HCTZ have been d/c'd, ?Renal function/K. Advised to increase amlodipine to 5 mg. Agree, he may reduce metoprolol from 50 mg BID to 25 mg BID secondary to dizziness and bradycardia. Advised to monitor bp.     3. Hyperlipidemia- LDL 72/HDL 52 at goal with Crestor and Zetia.     4. Dizziness- carotid US to rule out stenosis causing the dizziness.     Further recommendations to follow. FU in 6 months with Dr. Tracy as requested.     BMI is >= 30 and <= 34.9 (Class 1 obesity). The following options were offered after discussion: nutrition counseling/recommendations               Electronically signed by MARTHA Blackburn,  May 17, 2022 12:45 EDT

## 2022-07-06 ENCOUNTER — HOSPITAL ENCOUNTER (OUTPATIENT)
Dept: CARDIOLOGY | Facility: HOSPITAL | Age: 77
Discharge: HOME | End: 2022-07-06

## 2022-07-06 ENCOUNTER — APPOINTMENT (OUTPATIENT)
Dept: CARDIOLOGY | Facility: HOSPITAL | Age: 77
End: 2022-07-06

## 2022-07-06 VITALS — BODY MASS INDEX: 31.21 KG/M2 | WEIGHT: 218.03 LBS | HEIGHT: 70 IN

## 2022-07-06 DIAGNOSIS — I10 PRIMARY HYPERTENSION: ICD-10-CM

## 2022-07-06 DIAGNOSIS — I20.8 STABLE ANGINA PECTORIS: ICD-10-CM

## 2022-07-06 DIAGNOSIS — R06.02 SHORTNESS OF BREATH: ICD-10-CM

## 2022-07-06 DIAGNOSIS — I25.9 IHD (ISCHEMIC HEART DISEASE): ICD-10-CM

## 2022-07-06 DIAGNOSIS — E78.00 HYPERCHOLESTEREMIA: ICD-10-CM

## 2022-07-06 LAB
AORTIC DIMENSIONLESS INDEX: 0.89 (DI)
BH CV ECHO MEAS - ACS: 1.93 CM
BH CV ECHO MEAS - AO MAX PG: 5.6 MMHG
BH CV ECHO MEAS - AO MEAN PG: 2.8 MMHG
BH CV ECHO MEAS - AO ROOT DIAM: 3.4 CM
BH CV ECHO MEAS - AO V2 MAX: 118.8 CM/SEC
BH CV ECHO MEAS - AO V2 VTI: 25.7 CM
BH CV ECHO MEAS - EDV(CUBED): 110.1 ML
BH CV ECHO MEAS - EF_3D-VOL: 67 %
BH CV ECHO MEAS - ESV(CUBED): 35 ML
BH CV ECHO MEAS - FS: 31.8 %
BH CV ECHO MEAS - IVS/LVPW: 1.02 CM
BH CV ECHO MEAS - IVSD: 1.3 CM
BH CV ECHO MEAS - LA DIMENSION: 4.4 CM
BH CV ECHO MEAS - LAT PEAK E' VEL: 5.9 CM/SEC
BH CV ECHO MEAS - LV MASS(C)D: 241.3 GRAMS
BH CV ECHO MEAS - LV MAX PG: 4.5 MMHG
BH CV ECHO MEAS - LV MEAN PG: 2.05 MMHG
BH CV ECHO MEAS - LV V1 MAX: 106.4 CM/SEC
BH CV ECHO MEAS - LV V1 VTI: 28.1 CM
BH CV ECHO MEAS - LVIDD: 4.8 CM
BH CV ECHO MEAS - LVIDS: 3.3 CM
BH CV ECHO MEAS - LVPWD: 1.27 CM
BH CV ECHO MEAS - MED PEAK E' VEL: 4.6 CM/SEC
BH CV ECHO MEAS - MV A MAX VEL: 73.5 CM/SEC
BH CV ECHO MEAS - MV DEC SLOPE: 451.8 CM/SEC2
BH CV ECHO MEAS - MV DEC TIME: 0.25 MSEC
BH CV ECHO MEAS - MV E MAX VEL: 93.9 CM/SEC
BH CV ECHO MEAS - MV E/A: 1.28
BH CV ECHO MEAS - MV MAX PG: 4.1 MMHG
BH CV ECHO MEAS - MV MEAN PG: 1.52 MMHG
BH CV ECHO MEAS - MV P1/2T: 65.8 MSEC
BH CV ECHO MEAS - MV V2 VTI: 34.5 CM
BH CV ECHO MEAS - MVA(P1/2T): 3.3 CM2
BH CV ECHO MEAS - PA V2 MAX: 102.4 CM/SEC
BH CV ECHO MEAS - RAP SYSTOLE: 10 MMHG
BH CV ECHO MEAS - RV MAX PG: 1.87 MMHG
BH CV ECHO MEAS - RV V1 MAX: 68.3 CM/SEC
BH CV ECHO MEAS - RV V1 VTI: 19.6 CM
BH CV ECHO MEAS - RVDD: 3.3 CM
BH CV ECHO MEAS - RVSP: 23.9 MMHG
BH CV ECHO MEAS - TAPSE (>1.6): 2.21 CM
BH CV ECHO MEAS - TR MAX PG: 13.9 MMHG
BH CV ECHO MEAS - TR MAX VEL: 186.4 CM/SEC
BH CV ECHO MEASUREMENTS AVERAGE E/E' RATIO: 17.89
BH CV REST NUCLEAR ISOTOPE DOSE: 10 MCI
BH CV STRESS COMMENTS STAGE 1: NORMAL
BH CV STRESS DOSE REGADENOSON STAGE 1: 0.4
BH CV STRESS DURATION MIN STAGE 1: 0
BH CV STRESS DURATION SEC STAGE 1: 10
BH CV STRESS NUCLEAR ISOTOPE DOSE: 30 MCI
BH CV STRESS PROTOCOL 1: NORMAL
BH CV STRESS RECOVERY BP: NORMAL MMHG
BH CV STRESS RECOVERY HR: 71 BPM
BH CV STRESS STAGE 1: 1
BH CV XLRA - TDI S': 12.7 CM/SEC
IVRT: 132 MSEC
LV EF NUC BP: 61 %
MAXIMAL PREDICTED HEART RATE: 142 BPM
MAXIMAL PREDICTED HEART RATE: 142 BPM
PERCENT MAX PREDICTED HR: 56.34 %
SINUS: 3.4 CM
STRESS BASELINE BP: NORMAL MMHG
STRESS BASELINE HR: 62 BPM
STRESS PERCENT HR: 66 %
STRESS POST PEAK BP: NORMAL MMHG
STRESS POST PEAK HR: 80 BPM
STRESS TARGET HR: 121 BPM
STRESS TARGET HR: 121 BPM

## 2022-07-06 PROCEDURE — A9500 TC99M SESTAMIBI: HCPCS | Performed by: INTERNAL MEDICINE

## 2022-07-06 PROCEDURE — 93306 TTE W/DOPPLER COMPLETE: CPT

## 2022-07-06 PROCEDURE — 25010000002 REGADENOSON 0.4 MG/5ML SOLUTION: Performed by: INTERNAL MEDICINE

## 2022-07-06 PROCEDURE — 93017 CV STRESS TEST TRACING ONLY: CPT

## 2022-07-06 PROCEDURE — 93018 CV STRESS TEST I&R ONLY: CPT | Performed by: INTERNAL MEDICINE

## 2022-07-06 PROCEDURE — 78452 HT MUSCLE IMAGE SPECT MULT: CPT

## 2022-07-06 PROCEDURE — 0 TECHNETIUM SESTAMIBI: Performed by: INTERNAL MEDICINE

## 2022-07-06 PROCEDURE — 93306 TTE W/DOPPLER COMPLETE: CPT | Performed by: INTERNAL MEDICINE

## 2022-07-06 PROCEDURE — 78452 HT MUSCLE IMAGE SPECT MULT: CPT | Performed by: INTERNAL MEDICINE

## 2022-07-06 RX ADMIN — TECHNETIUM TC 99M SESTAMIBI 1 DOSE: 1 INJECTION INTRAVENOUS at 09:41

## 2022-07-06 RX ADMIN — TECHNETIUM TC 99M SESTAMIBI 1 DOSE: 1 INJECTION INTRAVENOUS at 11:15

## 2022-07-06 RX ADMIN — REGADENOSON 0.4 MG: 0.08 INJECTION, SOLUTION INTRAVENOUS at 11:14

## 2022-07-07 DIAGNOSIS — I10 ESSENTIAL HYPERTENSION: ICD-10-CM

## 2022-07-07 RX ORDER — AMLODIPINE BESYLATE 10 MG/1
TABLET ORAL
Qty: 90 TABLET | Refills: 3 | Status: SHIPPED | OUTPATIENT
Start: 2022-07-07 | End: 2023-01-05

## 2022-08-15 RX ORDER — DICYCLOMINE HYDROCHLORIDE 10 MG/1
10 CAPSULE ORAL 2 TIMES DAILY
Qty: 180 CAPSULE | Refills: 1 | Status: SHIPPED | OUTPATIENT
Start: 2022-08-15 | End: 2023-01-05

## 2022-11-10 ENCOUNTER — APPOINTMENT (OUTPATIENT)
Dept: CARDIOLOGY | Facility: HOSPITAL | Age: 77
End: 2022-11-10

## 2023-01-05 ENCOUNTER — OFFICE VISIT (OUTPATIENT)
Dept: CARDIOLOGY | Facility: CLINIC | Age: 78
End: 2023-01-05
Payer: MEDICARE

## 2023-01-05 VITALS
HEART RATE: 66 BPM | DIASTOLIC BLOOD PRESSURE: 78 MMHG | BODY MASS INDEX: 30.64 KG/M2 | WEIGHT: 214 LBS | HEIGHT: 70 IN | SYSTOLIC BLOOD PRESSURE: 148 MMHG

## 2023-01-05 DIAGNOSIS — E66.9 OBESITY (BMI 30.0-34.9): ICD-10-CM

## 2023-01-05 DIAGNOSIS — E78.00 HYPERCHOLESTEREMIA: ICD-10-CM

## 2023-01-05 DIAGNOSIS — I10 ESSENTIAL HYPERTENSION: ICD-10-CM

## 2023-01-05 DIAGNOSIS — I25.9 IHD (ISCHEMIC HEART DISEASE): Primary | ICD-10-CM

## 2023-01-05 DIAGNOSIS — R06.02 SHORTNESS OF BREATH: ICD-10-CM

## 2023-01-05 PROCEDURE — 99214 OFFICE O/P EST MOD 30 MIN: CPT | Performed by: NURSE PRACTITIONER

## 2023-01-05 RX ORDER — ROSUVASTATIN CALCIUM 10 MG/1
10 TABLET, COATED ORAL DAILY
Qty: 90 TABLET | Refills: 3 | Status: SHIPPED | OUTPATIENT
Start: 2023-01-05

## 2023-01-05 RX ORDER — AMLODIPINE BESYLATE 10 MG/1
10 TABLET ORAL DAILY
Qty: 90 TABLET | Refills: 3
Start: 2023-01-05

## 2023-01-05 RX ORDER — METOPROLOL TARTRATE 50 MG/1
50 TABLET, FILM COATED ORAL 2 TIMES DAILY
Qty: 180 TABLET | Refills: 3 | Status: SHIPPED | OUTPATIENT
Start: 2023-01-05

## 2023-01-05 RX ORDER — EZETIMIBE 10 MG/1
10 TABLET ORAL DAILY
Qty: 90 TABLET | Refills: 3 | Status: SHIPPED | OUTPATIENT
Start: 2023-01-05

## 2023-01-05 NOTE — ACP (ADVANCE CARE PLANNING)
Advance Care Planning   ACP discussion was held with the patient during this visit. Patient does not have an advance directive, information provided.

## 2023-01-05 NOTE — PROGRESS NOTES
Chief Complaint   Patient presents with   • Follow-up     Pt is here for cardiac follow up.  Pt states he does have some SOB and dizziness.  He denies CP or palpitations.  Pt does take a daily ASA     • Med Refill     Pt request 90 day refills to be sent to [facility].  Medications were verified by the pt.     • Lab Work     Pt states their last labs were 10/17/22 with the [facility]- pt brought copy in for review.         Cardiac Complaints  dyspnea      Subjective   Steve Santoyo is a 77 y.o. male with HTN, hyperlipidemia, and IHD diagnosed in 2001 when he was found to have an occluded circumflex which was confirmed in 2002 also.  In 2003 found to have worsening of disease involving the left main and the LAD and underwent two-vessel bypass surgery.  In 2014 found that the vein graft to the obtuse marginal was occluded and RCA had new lesion for which he underwent stenting.  Since then he has undergone stress test twice once in 2016 other one is 2019 and both of them showed some changes in the inferior wall.  He did not have any more anginal symptoms have been managing him medically.  In 2022, more chest pain and SOA noted, stress and echo advised. Stress showed EF 61% with inferolateral infarct and mild michela infarct ischemia, norvasc dosing increased for HTN response.    He comes today for follow up and does report some SOA and dizziness which he reports has been the same for years. No CP, palpitations, or syncope noted. He does report he is using exercises for dizziness management per [facility]. He brings list with him that shows elevated BP at home, but he admits he is only taking norvasc 5mg vs 10mg that was recommended after stress. Labs from 10/2022 showed: HH 14.8/47.3, TRIG 106, HDL 50, LDL 83, GFR 49, BUN 16, Creatinine 1.47, AST 23, ALT 24, TSH 3.6691.                 Cardiac History  Past Surgical History:   Procedure Laterality Date   • CARDIAC CATHETERIZATION  08/13/2001    100% LCX   • CARDIAC CATHETERIZATION  04/24/2002     100% LCX   • CARDIAC CATHETERIZATION  12/16/2003    50% LM, 70% LAD, 100% LCX   • CARDIAC CATHETERIZATION  06/24/2014    patent LIMA-LAD. 100% SVG-OM, 90% RCA-3.0x26 resolute   • CARDIOVASCULAR STRESS TEST  09/20/2007    7 min, 70% THR, negative   • CARDIOVASCULAR STRESS TEST  12/01/2008    7 min, 30 sec., 68% THR. R/O lateral ischemia   • CARDIOVASCULAR STRESS TEST  04/12/2010    S. Echo- 7min, 30sec, 79% THR. Negative   • CARDIOVASCULAR STRESS TEST  04/13/2012    L. Myoview- small apical ischemia   • CARDIOVASCULAR STRESS TEST  02/10/2016    L. Myoview- Inferolateral Ischemia. EF > 65%   • CARDIOVASCULAR STRESS TEST  03/13/2019    L.Cardiolite- EF 66%. Inferolateral Ischemia.   • CARDIOVASCULAR STRESS TEST  07/06/2022    Lexiscan- EF 61%. BP- 178/87. Inferolateral Infarct with Krissy Infarct Ischemia   • CORONARY ARTERY BYPASS GRAFT  12/18/2003    LIMA-LAD, SVG-OM.   • ECHO - CONVERTED  09/20/2007    EF >60%, Mild to mod MR.   • ECHO - CONVERTED  12/01/2008    EF>60%, Lateral WMA. RVSP- 42mmHg   • ECHO - CONVERTED  04/13/2012    EF 60-65%, aortic root 4.1cm   • ECHO - CONVERTED  07/01/2015    EF 65%   • ECHO - CONVERTED  02/10/2016    EF 60%   • ECHO - CONVERTED  03/13/2019    EF 65%. Mild MR. LA- 3.9 Cm   • ECHO - CONVERTED  07/06/2022    TLS. EF 65%. LA- 4.4. Mild MR. RVSP- 24 mmHg   • OTHER SURGICAL HISTORY  04/14/2011    CT head- negative   • OTHER SURGICAL HISTORY  03/06/2017    BISHNU- no hemodynamically significant stenosis noted either side   • US CAROTID UNILATERAL  04/14/2011    no sig stenosis       Current Outpatient Medications   Medication Sig Dispense Refill   • aspirin 81 MG EC tablet Take 1 tablet by mouth Daily. 30 tablet 3   • esomeprazole (nexIUM) 40 MG capsule Take 1 capsule by mouth Every Morning Before Breakfast. 30 capsule 3   • ezetimibe (ZETIA) 10 MG tablet Take 1 tablet by mouth Daily. 90 tablet 3   • levothyroxine (SYNTHROID, LEVOTHROID) 100 MCG tablet Take 100 mcg by mouth Daily.     •  nitroglycerin (NITROSTAT) 0.4 MG SL tablet 1 under the tongue as needed for angina, may repeat q5mins for up three doses 25 tablet 1   • rosuvastatin (CRESTOR) 10 MG tablet Take 1 tablet by mouth Daily. 90 tablet 3   • amLODIPine (NORVASC) 10 MG tablet Take 1 tablet by mouth Daily. 90 tablet 3   • metoprolol tartrate (LOPRESSOR) 50 MG tablet Take 1 tablet by mouth 2 (Two) Times a Day. 180 tablet 3     No current facility-administered medications for this visit.       Patient has no known allergies.    Past Medical History:   Diagnosis Date   • Arthritis    • GERD (gastroesophageal reflux disease)    • Hypercholesterolemia    • Hyperlipidemia    • Hypertension    • Hypothyroidism    • IHD (ischemic heart disease)    • PUD (peptic ulcer disease)        Social History     Socioeconomic History   • Marital status:    Tobacco Use   • Smoking status: Former     Types: Cigarettes     Quit date:      Years since quittin.0   • Smokeless tobacco: Never   Vaping Use   • Vaping Use: Never used   Substance and Sexual Activity   • Alcohol use: No   • Drug use: No       Family History   Problem Relation Age of Onset   • Heart attack Mother    • Heart disease Mother         stent placement    • Heart attack Father         multiple   • Lung disease Father    • Heart attack Brother    • Heart disease Brother         CABG at age 42       Review of Systems   Constitutional: Negative for malaise/fatigue and night sweats.   Cardiovascular: Positive for dyspnea on exertion. Negative for chest pain, irregular heartbeat, leg swelling, near-syncope, orthopnea, palpitations and syncope.   Respiratory: Positive for shortness of breath. Negative for cough and wheezing.    Musculoskeletal: Positive for stiffness. Negative for back pain and joint pain.   Gastrointestinal: Negative for anorexia, heartburn, nausea and vomiting.   Genitourinary: Negative for dysuria, hematuria, hesitancy and nocturia.   Neurological: Positive for  dizziness. Negative for headaches and light-headedness.   Psychiatric/Behavioral: Negative for depression and memory loss. The patient is not nervous/anxious.            Objective     /78 (BP Location: Left arm, Patient Position: Sitting)   Pulse 66   Ht 177.8 cm (70\")   Wt 97.1 kg (214 lb)   BMI 30.71 kg/m²     Constitutional:       Appearance: Not in distress.   Eyes:      Pupils: Pupils are equal, round, and reactive to light.   HENT:      Nose: Nose normal.   Pulmonary:      Effort: Pulmonary effort is normal.      Breath sounds: Normal breath sounds.   Cardiovascular:      PMI at left midclavicular line. Normal rate. Regular rhythm.      Murmurs: There is a systolic murmur.   Abdominal:      Palpations: Abdomen is soft.   Musculoskeletal: Normal range of motion.      Cervical back: Normal range of motion and neck supple. Skin:     General: Skin is warm and dry.   Neurological:      Mental Status: Alert.         Procedures         Diagnoses and all orders for this visit:    1. IHD (ischemic heart disease) (Primary)    2. Essential hypertension    3. Hypercholesteremia  -     ezetimibe (ZETIA) 10 MG tablet; Take 1 tablet by mouth Daily.  Dispense: 90 tablet; Refill: 3  -     rosuvastatin (CRESTOR) 10 MG tablet; Take 1 tablet by mouth Daily.  Dispense: 90 tablet; Refill: 3    4. Shortness of breath    5. Obesity (BMI 30.0-34.9)    Other orders  -     amLODIPine (NORVASC) 10 MG tablet; Take 1 tablet by mouth Daily.  Dispense: 90 tablet; Refill: 3  -     metoprolol tartrate (LOPRESSOR) 50 MG tablet; Take 1 tablet by mouth 2 (Two) Times a Day.  Dispense: 180 tablet; Refill: 3             IHD:  Status stable. No new concerns voiced.  Most recent stress showed mild ischemia, no new or worsening symptoms reported. Chest pain remains well managed. ASA therapy continued at current, bleeding and bruising denied. No repeat workup will be recommended.     HTN:  Blood pressure elevated, he has not increased norvasc  to 10mg. We will urge to do so as his log showed elevation too.  Lopressor continued at 50mg BID. Limited sodium urged.     Hyperlipidemia:  On statin therapy with crestor and zetia therapy without concerns. FLP from Oct 2022 showed lipids at goal. Most recent showed lipids at goal.     Hypothyroid:  On synthroid therapy without concerns, HR at goal.  TSH showed hypothyroidism at goal.      BMI noted at 30.70, good cardiac diet with limited carbs, calories, and activity as tolerated advised.      Refills per request     6 month follow up recommended or sooner if needed        Problems Addressed this Visit        Cardiac and Vasculature    IHD (ischemic heart disease) - Primary    Relevant Medications    amLODIPine (NORVASC) 10 MG tablet    metoprolol tartrate (LOPRESSOR) 50 MG tablet    Essential hypertension    Relevant Medications    amLODIPine (NORVASC) 10 MG tablet    metoprolol tartrate (LOPRESSOR) 50 MG tablet    Hypercholesteremia    Relevant Medications    ezetimibe (ZETIA) 10 MG tablet    rosuvastatin (CRESTOR) 10 MG tablet       Pulmonary and Pneumonias    Shortness of breath   Other Visit Diagnoses     Obesity (BMI 30.0-34.9)          Diagnoses       Codes Comments    IHD (ischemic heart disease)    -  Primary ICD-10-CM: I25.9  ICD-9-CM: 414.9     Essential hypertension     ICD-10-CM: I10  ICD-9-CM: 401.9     Hypercholesteremia     ICD-10-CM: E78.00  ICD-9-CM: 272.0     Shortness of breath     ICD-10-CM: R06.02  ICD-9-CM: 786.05     Obesity (BMI 30.0-34.9)     ICD-10-CM: E66.9  ICD-9-CM: 278.00                   Electronically signed by MARTHA Adames January 5, 2023 13:18 EST

## 2023-01-05 NOTE — LETTER
January 5, 2023     Mary Stinson DO  [address] [facility] [place] [address]    Patient: Steve Santoyo   YOB: 1945   Date of Visit: 1/5/2023       Dear Mayr Stinson DO    Steve Santoyo was in my office today. Below is a copy of my note.    If you have questions, please do not hesitate to call me. I look forward to following Steve along with you.         Sincerely,        MARTHA Adames        CC: No Recipients    Chief Complaint   Patient presents with   • Follow-up     Pt is here for cardiac follow up.  Pt states he does have some SOB and dizziness.  He denies CP or palpitations.  Pt does take a daily ASA     • Med Refill     Pt request 90 day refills to be sent to [facility].  Medications were verified by the pt.     • Lab Work     Pt states their last [facility]- pt brought copy in for review.         Cardiac Complaints  dyspnea      Subjective    Steve Santoyo is a 77 y.o. male with HTN, hyperlipidemia, and IHD diagnosed in 2001 when he was found to have an occluded circumflex which was confirmed in 2002 also.  In 2003 found to have worsening of disease involving the left main and the LAD and underwent two-vessel bypass surgery.  In 2014 found that the vein graft to the obtuse marginal was occluded and RCA had new lesion for which he underwent stenting.  Since then he has undergone stress test twice once in 2016 other one is 2019 and both of them showed some changes in the inferior wall.  He did not have any more anginal symptoms have been managing him medically.  In 2022, more chest pain and SOA noted, stress and echo advised. Stress showed EF 61% with inferolateral infarct and mild michela infarct ischemia, norvasc dosing increased for HTN response.    He comes today for follow up and does report some SOA and dizziness which he reports has been the same for years. No CP, palpitations, or syncope noted. He does report he is using exercises for dizziness [facility]. He brings  list with him that shows elevated BP at home, but he admits he is only taking norvasc 5mg vs 10mg that was recommended after stress. Labs from 10/2022 showed: HH 14.8/47.3, TRIG 106, HDL 50, LDL 83, GFR 49, BUN 16, Creatinine 1.47, AST 23, ALT 24, TSH 3.6691.                 Cardiac History  Past Surgical History:   Procedure Laterality Date   • CARDIAC CATHETERIZATION  08/13/2001    100% LCX   • CARDIAC CATHETERIZATION  04/24/2002    100% LCX   • CARDIAC CATHETERIZATION  12/16/2003    50% LM, 70% LAD, 100% LCX   • CARDIAC CATHETERIZATION  06/24/2014    patent LIMA-LAD. 100% SVG-OM, 90% RCA-3.0x26 resolute   • CARDIOVASCULAR STRESS TEST  09/20/2007    7 min, 70% THR, negative   • CARDIOVASCULAR STRESS TEST  12/01/2008    7 min, 30 sec., 68% THR. R/O lateral ischemia   • CARDIOVASCULAR STRESS TEST  04/12/2010    S. Echo- 7min, 30sec, 79% THR. Negative   • CARDIOVASCULAR STRESS TEST  04/13/2012    L. Myoview- small apical ischemia   • CARDIOVASCULAR STRESS TEST  02/10/2016    L. Myoview- Inferolateral Ischemia. EF > 65%   • CARDIOVASCULAR STRESS TEST  03/13/2019    L.Cardiolite- EF 66%. Inferolateral Ischemia.   • CARDIOVASCULAR STRESS TEST  07/06/2022    Lexiscan- EF 61%. BP- 178/87. Inferolateral Infarct with Krissy Infarct Ischemia   • CORONARY ARTERY BYPASS GRAFT  12/18/2003    LIMA-LAD, SVG-OM.   • ECHO - CONVERTED  09/20/2007    EF >60%, Mild to mod MR.   • ECHO - CONVERTED  12/01/2008    EF>60%, Lateral WMA. RVSP- 42mmHg   • ECHO - CONVERTED  04/13/2012    EF 60-65%, aortic root 4.1cm   • ECHO - CONVERTED  07/01/2015    EF 65%   • ECHO - CONVERTED  02/10/2016    EF 60%   • ECHO - CONVERTED  03/13/2019    EF 65%. Mild MR. LA- 3.9 Cm   • ECHO - CONVERTED  07/06/2022    TLS. EF 65%. LA- 4.4. Mild MR. RVSP- 24 mmHg   • OTHER SURGICAL HISTORY  04/14/2011    CT head- negative   • OTHER SURGICAL HISTORY  03/06/2017    BISHNU- no hemodynamically significant stenosis noted either side   •  CAROTID UNILATERAL  04/14/2011     no sig stenosis       Current Outpatient Medications   Medication Sig Dispense Refill   • aspirin 81 MG EC tablet Take 1 tablet by mouth Daily. 30 tablet 3   • esomeprazole (nexIUM) 40 MG capsule Take 1 capsule by mouth Every Morning Before Breakfast. 30 capsule 3   • ezetimibe (ZETIA) 10 MG tablet Take 1 tablet by mouth Daily. 90 tablet 3   • levothyroxine (SYNTHROID, LEVOTHROID) 100 MCG tablet Take 100 mcg by mouth Daily.     • nitroglycerin (NITROSTAT) 0.4 MG SL tablet 1 under the tongue as needed for angina, may repeat q5mins for up three doses 25 tablet 1   • rosuvastatin (CRESTOR) 10 MG tablet Take 1 tablet by mouth Daily. 90 tablet 3   • amLODIPine (NORVASC) 10 MG tablet Take 1 tablet by mouth Daily. 90 tablet 3   • metoprolol tartrate (LOPRESSOR) 50 MG tablet Take 1 tablet by mouth 2 (Two) Times a Day. 180 tablet 3     No current facility-administered medications for this visit.       Patient has no known allergies.    Past Medical History:   Diagnosis Date   • Arthritis    • GERD (gastroesophageal reflux disease)    • Hypercholesterolemia    • Hyperlipidemia    • Hypertension    • Hypothyroidism    • IHD (ischemic heart disease)    • PUD (peptic ulcer disease)        Social History     Socioeconomic History   • Marital status:    Tobacco Use   • Smoking status: Former     Types: Cigarettes     Quit date:      Years since quittin.0   • Smokeless tobacco: Never   Vaping Use   • Vaping Use: Never used   Substance and Sexual Activity   • Alcohol use: No   • Drug use: No       Family History   Problem Relation Age of Onset   • Heart attack Mother    • Heart disease Mother         stent placement    • Heart attack Father         multiple   • Lung disease Father    • Heart attack Brother    • Heart disease Brother         CABG at age 42       Review of Systems   Constitutional: Negative for malaise/fatigue and night sweats.   Cardiovascular: Positive for dyspnea on exertion. Negative for chest  pain, irregular heartbeat, leg swelling, near-syncope, orthopnea, palpitations and syncope.   Respiratory: Positive for shortness of breath. Negative for cough and wheezing.    Musculoskeletal: Positive for stiffness. Negative for back pain and joint pain.   Gastrointestinal: Negative for anorexia, heartburn, nausea and vomiting.   Genitourinary: Negative for dysuria, hematuria, hesitancy and nocturia.   Neurological: Positive for dizziness. Negative for headaches and light-headedness.   Psychiatric/Behavioral: Negative for depression and memory loss. The patient is not nervous/anxious.            Objective      /78 (BP Location: Left arm, Patient Position: Sitting)   Pulse 66   Ht 177.8 cm (70\")   Wt 97.1 kg (214 lb)   BMI 30.71 kg/m²     Constitutional:       Appearance: Not in distress.   Eyes:      Pupils: Pupils are equal, round, and reactive to light.   HENT:      Nose: Nose normal.   Pulmonary:      Effort: Pulmonary effort is normal.      Breath sounds: Normal breath sounds.   Cardiovascular:      PMI at left midclavicular line. Normal rate. Regular rhythm.      Murmurs: There is a systolic murmur.   Abdominal:      Palpations: Abdomen is soft.   Musculoskeletal: Normal range of motion.      Cervical back: Normal range of motion and neck supple. Skin:     General: Skin is warm and dry.   Neurological:      Mental Status: Alert.         Procedures        Diagnoses and all orders for this visit:    1. IHD (ischemic heart disease) (Primary)    2. Essential hypertension    3. Hypercholesteremia  -     ezetimibe (ZETIA) 10 MG tablet; Take 1 tablet by mouth Daily.  Dispense: 90 tablet; Refill: 3  -     rosuvastatin (CRESTOR) 10 MG tablet; Take 1 tablet by mouth Daily.  Dispense: 90 tablet; Refill: 3    4. Shortness of breath    5. Obesity (BMI 30.0-34.9)    Other orders  -     amLODIPine (NORVASC) 10 MG tablet; Take 1 tablet by mouth Daily.  Dispense: 90 tablet; Refill: 3  -     metoprolol tartrate  (LOPRESSOR) 50 MG tablet; Take 1 tablet by mouth 2 (Two) Times a Day.  Dispense: 180 tablet; Refill: 3            IHD:  Status stable. No new concerns voiced.  Most recent stress showed mild ischemia, no new or worsening symptoms reported. Chest pain remains well managed. ASA therapy continued at current, bleeding and bruising denied. No repeat workup will be recommended.     HTN:  Blood pressure elevated, he has not increased norvasc to 10mg. We will urge to do so as his log showed elevation too.  Lopressor continued at 50mg BID. Limited sodium urged.     Hyperlipidemia:  On statin therapy with crestor and zetia therapy without concerns. FLP from Oct 2022 showed lipids at goal. Most recent showed lipids at goal.     Hypothyroid:  On synthroid therapy without concerns, HR at goal.  TSH showed hypothyroidism at goal.      BMI noted at 30.70, good cardiac diet with limited carbs, calories, and activity as tolerated advised.      Refills per request     6 month follow up recommended or sooner if needed        Problems Addressed this Visit        Cardiac and Vasculature    IHD (ischemic heart disease) - Primary    Relevant Medications    amLODIPine (NORVASC) 10 MG tablet    metoprolol tartrate (LOPRESSOR) 50 MG tablet    Essential hypertension    Relevant Medications    amLODIPine (NORVASC) 10 MG tablet    metoprolol tartrate (LOPRESSOR) 50 MG tablet    Hypercholesteremia    Relevant Medications    ezetimibe (ZETIA) 10 MG tablet    rosuvastatin (CRESTOR) 10 MG tablet       Pulmonary and Pneumonias    Shortness of breath   Other Visit Diagnoses     Obesity (BMI 30.0-34.9)          Diagnoses       Codes Comments    IHD (ischemic heart disease)    -  Primary ICD-10-CM: I25.9  ICD-9-CM: 414.9     Essential hypertension     ICD-10-CM: I10  ICD-9-CM: 401.9     Hypercholesteremia     ICD-10-CM: E78.00  ICD-9-CM: 272.0     Shortness of breath     ICD-10-CM: R06.02  ICD-9-CM: 786.05     Obesity (BMI 30.0-34.9)     ICD-10-CM:  E66.9  ICD-9-CM: 278.00                   Electronically signed by Sylwia Syed, APRN January 5, 2023 13:18 EST

## 2023-03-01 RX ORDER — SILDENAFIL 50 MG/1
TABLET, FILM COATED ORAL
Qty: 10 TABLET | Refills: 6 | OUTPATIENT
Start: 2023-03-01

## 2023-03-13 ENCOUNTER — OFFICE VISIT (OUTPATIENT)
Dept: FAMILY MEDICINE CLINIC | Facility: CLINIC | Age: 78
End: 2023-03-13
Payer: MEDICARE

## 2023-03-13 VITALS
TEMPERATURE: 96.4 F | OXYGEN SATURATION: 98 % | RESPIRATION RATE: 18 BRPM | SYSTOLIC BLOOD PRESSURE: 128 MMHG | HEIGHT: 70 IN | HEART RATE: 69 BPM | DIASTOLIC BLOOD PRESSURE: 70 MMHG | WEIGHT: 212.8 LBS | BODY MASS INDEX: 30.46 KG/M2

## 2023-03-13 DIAGNOSIS — I10 ESSENTIAL HYPERTENSION: ICD-10-CM

## 2023-03-13 DIAGNOSIS — K21.9 GASTROESOPHAGEAL REFLUX DISEASE, UNSPECIFIED WHETHER ESOPHAGITIS PRESENT: ICD-10-CM

## 2023-03-13 DIAGNOSIS — N52.9 ERECTILE DYSFUNCTION, UNSPECIFIED ERECTILE DYSFUNCTION TYPE: ICD-10-CM

## 2023-03-13 DIAGNOSIS — E03.9 ACQUIRED HYPOTHYROIDISM: ICD-10-CM

## 2023-03-13 DIAGNOSIS — H53.9 VISION CHANGES: ICD-10-CM

## 2023-03-13 DIAGNOSIS — Z00.00 ANNUAL PHYSICAL EXAM: Primary | ICD-10-CM

## 2023-03-13 DIAGNOSIS — Z00.00 ANNUAL PHYSICAL EXAM: ICD-10-CM

## 2023-03-13 DIAGNOSIS — Z76.89 ENCOUNTER TO ESTABLISH CARE: ICD-10-CM

## 2023-03-13 LAB
ALBUMIN SERPL-MCNC: 4.8 G/DL (ref 3.5–5.2)
ALBUMIN/GLOB SERPL: 1.5 G/DL
ALP SERPL-CCNC: 91 U/L (ref 39–117)
ALT SERPL W P-5'-P-CCNC: 21 U/L (ref 1–41)
ANION GAP SERPL CALCULATED.3IONS-SCNC: 10.3 MMOL/L (ref 5–15)
AST SERPL-CCNC: 22 U/L (ref 1–40)
BASOPHILS # BLD AUTO: 0.04 10*3/MM3 (ref 0–0.2)
BASOPHILS NFR BLD AUTO: 0.4 % (ref 0–1.5)
BILIRUB SERPL-MCNC: 0.5 MG/DL (ref 0–1.2)
BUN SERPL-MCNC: 19 MG/DL (ref 8–23)
BUN/CREAT SERPL: 13.8 (ref 7–25)
CALCIUM SPEC-SCNC: 10 MG/DL (ref 8.6–10.5)
CHLORIDE SERPL-SCNC: 105 MMOL/L (ref 98–107)
CHOLEST SERPL-MCNC: 126 MG/DL (ref 0–200)
CO2 SERPL-SCNC: 26.7 MMOL/L (ref 22–29)
CREAT SERPL-MCNC: 1.38 MG/DL (ref 0.76–1.27)
DEPRECATED RDW RBC AUTO: 47.7 FL (ref 37–54)
EGFRCR SERPLBLD CKD-EPI 2021: 52.3 ML/MIN/1.73
EOSINOPHIL # BLD AUTO: 0.15 10*3/MM3 (ref 0–0.4)
EOSINOPHIL NFR BLD AUTO: 1.5 % (ref 0.3–6.2)
ERYTHROCYTE [DISTWIDTH] IN BLOOD BY AUTOMATED COUNT: 14.3 % (ref 12.3–15.4)
GLOBULIN UR ELPH-MCNC: 3.2 GM/DL
GLUCOSE SERPL-MCNC: 113 MG/DL (ref 65–99)
HCT VFR BLD AUTO: 44.4 % (ref 37.5–51)
HCV AB SER DONR QL: NORMAL
HDLC SERPL-MCNC: 51 MG/DL (ref 40–60)
HGB BLD-MCNC: 13.8 G/DL (ref 13–17.7)
IMM GRANULOCYTES # BLD AUTO: 0.04 10*3/MM3 (ref 0–0.05)
IMM GRANULOCYTES NFR BLD AUTO: 0.4 % (ref 0–0.5)
LDLC SERPL CALC-MCNC: 58 MG/DL (ref 0–100)
LDLC/HDLC SERPL: 1.13 {RATIO}
LYMPHOCYTES # BLD AUTO: 2.26 10*3/MM3 (ref 0.7–3.1)
LYMPHOCYTES NFR BLD AUTO: 22.9 % (ref 19.6–45.3)
MCH RBC QN AUTO: 28.2 PG (ref 26.6–33)
MCHC RBC AUTO-ENTMCNC: 31.1 G/DL (ref 31.5–35.7)
MCV RBC AUTO: 90.8 FL (ref 79–97)
MONOCYTES # BLD AUTO: 0.64 10*3/MM3 (ref 0.1–0.9)
MONOCYTES NFR BLD AUTO: 6.5 % (ref 5–12)
NEUTROPHILS NFR BLD AUTO: 6.74 10*3/MM3 (ref 1.7–7)
NEUTROPHILS NFR BLD AUTO: 68.3 % (ref 42.7–76)
NRBC BLD AUTO-RTO: 0 /100 WBC (ref 0–0.2)
PLATELET # BLD AUTO: 233 10*3/MM3 (ref 140–450)
PMV BLD AUTO: 10.4 FL (ref 6–12)
POTASSIUM SERPL-SCNC: 5.2 MMOL/L (ref 3.5–5.2)
PROT SERPL-MCNC: 8 G/DL (ref 6–8.5)
RBC # BLD AUTO: 4.89 10*6/MM3 (ref 4.14–5.8)
SODIUM SERPL-SCNC: 142 MMOL/L (ref 136–145)
TRIGL SERPL-MCNC: 87 MG/DL (ref 0–150)
TSH SERPL DL<=0.05 MIU/L-ACNC: 1.23 UIU/ML (ref 0.27–4.2)
VLDLC SERPL-MCNC: 17 MG/DL (ref 5–40)
WBC NRBC COR # BLD: 9.87 10*3/MM3 (ref 3.4–10.8)

## 2023-03-13 PROCEDURE — 90715 TDAP VACCINE 7 YRS/> IM: CPT | Performed by: NURSE PRACTITIONER

## 2023-03-13 PROCEDURE — 80061 LIPID PANEL: CPT | Performed by: NURSE PRACTITIONER

## 2023-03-13 PROCEDURE — 90471 IMMUNIZATION ADMIN: CPT | Performed by: NURSE PRACTITIONER

## 2023-03-13 PROCEDURE — 84443 ASSAY THYROID STIM HORMONE: CPT | Performed by: NURSE PRACTITIONER

## 2023-03-13 PROCEDURE — 86803 HEPATITIS C AB TEST: CPT | Performed by: NURSE PRACTITIONER

## 2023-03-13 PROCEDURE — 85025 COMPLETE CBC W/AUTO DIFF WBC: CPT | Performed by: NURSE PRACTITIONER

## 2023-03-13 PROCEDURE — 99214 OFFICE O/P EST MOD 30 MIN: CPT | Performed by: NURSE PRACTITIONER

## 2023-03-13 PROCEDURE — 80053 COMPREHEN METABOLIC PANEL: CPT | Performed by: NURSE PRACTITIONER

## 2023-03-13 RX ORDER — SILDENAFIL 50 MG/1
50 TABLET, FILM COATED ORAL DAILY PRN
Qty: 90 TABLET | Refills: 1 | Status: SHIPPED | OUTPATIENT
Start: 2023-03-13

## 2023-03-13 RX ORDER — ESOMEPRAZOLE MAGNESIUM 40 MG/1
40 CAPSULE, DELAYED RELEASE ORAL
Qty: 90 CAPSULE | Refills: 1 | Status: SHIPPED | OUTPATIENT
Start: 2023-03-13

## 2023-03-13 RX ORDER — SILDENAFIL 50 MG/1
50 TABLET, FILM COATED ORAL DAILY PRN
COMMUNITY
End: 2023-03-13 | Stop reason: SDUPTHER

## 2023-03-13 RX ORDER — ESOMEPRAZOLE MAGNESIUM 40 MG/1
40 CAPSULE, DELAYED RELEASE ORAL
Qty: 30 CAPSULE | Refills: 0 | Status: SHIPPED | OUTPATIENT
Start: 2023-03-13 | End: 2023-03-13 | Stop reason: SDUPTHER

## 2023-03-13 NOTE — TELEPHONE ENCOUNTER
DELETE AFTER REVIEWING: Send the encounter HIGH priority, If patient has less than a 3 day supply. If the patient will run out of medication over the weekend add that information to the additional details line. Send this encounter to the clinical pool.    Caller: Steve Santoyo    Relationship: Self    Best call back number: [phone]     Requested Prescriptions:   Requested Prescriptions     Pending Prescriptions Disp Refills   • esomeprazole (nexIUM) 40 MG capsule 30 capsule 3     Sig: Take 1 capsule by mouth Every Morning Before Breakfast.        Pharmacy where request should be sent: [id number] [place] [facility] [place] [facility] [phone] [phone] FX     Additional details provided by patient: PT HAS BEEN OUT OF MEDS FOR 2 WEEKS AND PHARMACY ADVISED THEY WOULD CONTACT US-PT ALSO NEEDS sildenafil (VIAGRA) 50 MG tablet     BUT REFILL WAS DENIED-PLEASE CALL PT     Does the patient have less than a 3 day supply:  [x] Yes  [] No    Would you like a call back once the refill request has been completed: [x] Yes [] No    If the office needs to give you a call back, can they leave a voicemail: [x] Yes [] No    Kedar Aden Rep   03/13/23 10:27 EDT

## 2023-03-13 NOTE — PROGRESS NOTES
Chief Complaint  Establish Care (Annual physical; cont care of htn, GERD, hyperlipidemia, ED)    Subjective        Steve Santoyo presents to [facility] to establish care (annual physical exam; cont care of HTN, hyperlipidemia, GERD, ED).    Hypertension  This is a chronic problem. Episode onset: 10 years or longer. The problem has been resolved since onset. The problem is controlled. Associated symptoms include anxiety, blurred vision, headaches, malaise/fatigue, palpitations, peripheral edema and shortness of breath. Pertinent negatives include no chest pain, neck pain, orthopnea, PND or sweats. Risk factors for coronary artery disease include male gender, obesity, dyslipidemia and sedentary lifestyle. Past treatments include beta blockers and calcium channel blockers. Current antihypertension treatment includes beta blockers and calcium channel blockers. The current treatment provides moderate improvement. Compliance problems include diet, exercise and psychosocial issues.    Heartburn  He complains of heartburn. He reports no abdominal pain, no belching, no chest pain, no choking, no coughing, no dysphagia, no early satiety, no globus sensation, no hoarse voice, no nausea, no sore throat, no stridor, no tooth decay, no water brash or no wheezing. This is a chronic problem. The current episode started more than 1 year ago. The problem occurs frequently. The problem has been gradually worsening (pt has been off of medication for approximately 2 weeks). The heartburn duration is several minutes. The heartburn is located in the substernum. The heartburn is of moderate intensity. The heartburn wakes him from sleep. The heartburn does not limit his activity. The heartburn changes with position. The symptoms are aggravated by certain foods and stress. Associated symptoms include fatigue. Pertinent negatives include no anemia, melena, muscle weakness, orthopnea or weight loss. Risk factors  include obesity and lack of exercise. He has tried a PPI for the symptoms. The treatment provided moderate relief.   Hypothyroidism  This is a chronic problem. The current episode started more than 1 year ago. The problem has been unchanged. Associated symptoms include fatigue, headaches and a visual change. Pertinent negatives include no abdominal pain, anorexia, arthralgias, change in bowel habit, chest pain, chills, congestion, coughing, diaphoresis, fever, joint swelling, myalgias, nausea, neck pain, numbness, rash, sore throat, swollen glands, urinary symptoms, vertigo, vomiting or weakness. Treatments tried: levothyroxine. The treatment provided significant relief.   Hyperlipidemia  This is a chronic problem. The current episode started more than 1 year ago. The problem is uncontrolled. Recent lipid tests were reviewed and are high. Exacerbating diseases include hypothyroidism. Factors aggravating his hyperlipidemia include beta blockers. Associated symptoms include shortness of breath. Pertinent negatives include no chest pain or myalgias. Current antihyperlipidemic treatment includes statins. The current treatment provides moderate improvement of lipids. Compliance problems include adherence to diet and adherence to exercise.  Risk factors for coronary artery disease include hypertension, dyslipidemia, male sex, obesity and a sedentary lifestyle.   Erectile Dysfunction  This is a chronic problem. The current episode started more than 1 year ago. The problem is unchanged. Obstructive symptoms include a weak stream. Obstructive symptoms do not include dribbling, incomplete emptying, an intermittent stream, a slower stream or straining. Pertinent negatives include no chills, dysuria, genital pain, hematuria, hesitancy or inability to urinate. The symptoms are aggravated by stress. Past treatments include sildenafil. The treatment provided significant relief. He has been using treatment for 2 or more years. He  "has had no adverse reactions caused by medications.     Objective   Vital Signs:  /70 (BP Location: Left arm, Patient Position: Sitting, Cuff Size: Adult)   Pulse 69   Temp 96.4 °F (35.8 °C) (Temporal)   Resp 18   Ht 177.8 cm (70\")   Wt 96.5 kg (212 lb 12.8 oz)   SpO2 98%   BMI 30.53 kg/m²   Estimated body mass index is 30.53 kg/m² as calculated from the following:    Height as of this encounter: 177.8 cm (70\").    Weight as of this encounter: 96.5 kg (212 lb 12.8 oz).       BMI is >= 30 and <35. (Class 1 Obesity). The following options were offered after discussion;: weight loss educational material (shared in after visit summary)      Physical Exam  Vitals and nursing note reviewed. Exam conducted with a chaperone present.   Constitutional:       General: He is awake.      Appearance: Normal appearance.   HENT:      Head: Normocephalic.      Right Ear: Tympanic membrane, ear canal and external ear normal. Decreased hearing noted.      Left Ear: Tympanic membrane, ear canal and external ear normal. Decreased hearing noted.      Nose: Nose normal.      Mouth/Throat:      Lips: Pink.      Mouth: Mucous membranes are moist.      Pharynx: Oropharynx is clear.   Eyes:      General: Lids are normal.      Extraocular Movements: Extraocular movements intact.      Conjunctiva/sclera: Conjunctivae normal.      Pupils: Pupils are equal, round, and reactive to light.   Cardiovascular:      Rate and Rhythm: Normal rate and regular rhythm.      Pulses: Normal pulses.      Heart sounds: Normal heart sounds.   Pulmonary:      Effort: Pulmonary effort is normal.      Breath sounds: Normal breath sounds.   Abdominal:      General: Abdomen is protuberant. Bowel sounds are normal.      Palpations: Abdomen is soft.      Tenderness: There is no abdominal tenderness.   Musculoskeletal:         General: Normal range of motion.      Cervical back: Normal range of motion and neck supple.      Right lower leg: No edema.      " Left lower leg: No edema.   Skin:     General: Skin is warm and dry.      Capillary Refill: Capillary refill takes less than 2 seconds.   Neurological:      Mental Status: He is alert and oriented to person, place, and time.      Cranial Nerves: Cranial nerves 2-12 are intact.      Sensory: Sensation is intact.      Motor: Motor function is intact.      Coordination: Coordination is intact.      Gait: Gait is intact.      Deep Tendon Reflexes: Reflexes are normal and symmetric.   Psychiatric:         Attention and Perception: Attention and perception normal.         Mood and Affect: Affect normal. Mood is anxious.         Speech: Speech is rapid and pressured.         Behavior: Behavior normal. Behavior is cooperative.         Thought Content: Thought content normal.         Cognition and Memory: Cognition normal.         Judgment: Judgment normal.        Result Review :  The following data was reviewed by: MARTHA Badillo on 03/13/2023:  CMP    CMP 3/13/23   Glucose 113 (A)   BUN 19   Creatinine 1.38 (A)   eGFR 52.3 (A)   Sodium 142   Potassium 5.2   Chloride 105   Calcium 10.0   Total Protein 8.0   Albumin 4.8   Globulin 3.2   Total Bilirubin 0.5   Alkaline Phosphatase 91   AST (SGOT) 22   ALT (SGPT) 21   Albumin/Globulin Ratio 1.5   BUN/Creatinine Ratio 13.8   Anion Gap 10.3   (A) Abnormal value            CBC w/diff    CBC w/Diff 3/13/23   WBC 9.87   RBC 4.89   Hemoglobin 13.8   Hematocrit 44.4   MCV 90.8   MCH 28.2   MCHC 31.1 (A)   RDW 14.3   Platelets 233   Neutrophil Rel % 68.3   Immature Granulocyte Rel % 0.4   Lymphocyte Rel % 22.9   Monocyte Rel % 6.5   Eosinophil Rel % 1.5   Basophil Rel % 0.4   (A) Abnormal value            Lipid Panel    Lipid Panel 3/13/23   Total Cholesterol 126   Triglycerides 87   HDL Cholesterol 51   VLDL Cholesterol 17   LDL Cholesterol  58   LDL/HDL Ratio 1.13           TSH    TSH 3/13/23   TSH 1.230           Assessment and Plan   Diagnoses and all orders for this  visit:    1. Annual physical exam (Primary)  -     CBC w AUTO Differential; Future  -     Comprehensive metabolic panel; Future  -     Lipid panel; Future  -     TSH; Future  -     Hepatitis C antibody; Future  -     Tdap Vaccine Greater Than or Equal To 8yo IM    2. Essential hypertension  Comments:  cont f/u with est cardiologist  Assessment & Plan:  Hypertension is unchanged.  Continue current treatment regimen.  Blood pressure will be reassessed at the next regular appointment.    Orders:  -     Comprehensive metabolic panel; Future  -     Lipid panel; Future  -     TSH; Future    3. Acquired hypothyroidism  Comments:  continue levothyroxine; will adjust accordingly    4. Erectile dysfunction, unspecified erectile dysfunction type  -     sildenafil (VIAGRA) 50 MG tablet; Take 1 tablet by mouth Daily As Needed for Erectile Dysfunction.  Dispense: 90 tablet; Refill: 1    5. Gastroesophageal reflux disease, unspecified whether esophagitis present  -     esomeprazole (nexIUM) 40 MG capsule; Take 1 capsule by mouth Every Morning Before Breakfast.  Dispense: 90 capsule; Refill: 1    6. Vision changes  -     Ambulatory Referral to Ophthalmology    7. Encounter to establish care         The preventive exam has been reviewed in detail.  The patient has been fully counseled on preventative guidelines for vaccines, cancer screenings, and other health maintenance needs.   The patient has been counseled on guidelines for maintaining a lifestyle to promote good health and to minimize chronic diseases.  The patient has been assisted with scheduling these healthcare procedures for the coming year and given a written document of health maintenance and anticipatory guidance for age with the AVS.    I spent 30 minutes caring for Steve on this date of service. This time includes time spent by me in the following activities:preparing for the visit, reviewing tests, obtaining and/or reviewing a separately obtained history, performing  a medically appropriate examination and/or evaluation , counseling and educating the patient/family/caregiver, ordering medications, tests, or procedures, referring and communicating with other health care professionals , documenting information in the medical record, independently interpreting results and communicating that information with the patient/family/caregiver and care coordination     Follow Up   Return in about 3 months (around 6/13/2023) for Medicare Wellness.  Patient was given instructions and counseling regarding his condition or for health maintenance advice. Please see specific information pulled into the AVS if appropriate.       This document has been electronically signed by MARTHA Badillo  March 14, 2023 06:42 EDT

## 2023-03-14 ENCOUNTER — TELEPHONE (OUTPATIENT)
Dept: FAMILY MEDICINE CLINIC | Facility: CLINIC | Age: 78
End: 2023-03-14
Payer: MEDICARE

## 2023-03-14 NOTE — PATIENT INSTRUCTIONS
"Hypertension, Adult  High blood pressure (hypertension) is when the force of blood pumping through the arteries is too strong. The arteries are the blood vessels that carry blood from the heart throughout the body. Hypertension forces the heart to work harder to pump blood and may cause arteries to become narrow or stiff. Untreated or uncontrolled hypertension can cause a heart attack, heart failure, a stroke, kidney disease, and other problems.  A blood pressure reading consists of a higher number over a lower number. Ideally, your blood pressure should be below 120/80. The first (\"top\") number is called the systolic pressure. It is a measure of the pressure in your arteries as your heart beats. The second (\"bottom\") number is called the diastolic pressure. It is a measure of the pressure in your arteries as the heart relaxes.  What are the causes?  The exact cause of this condition is not known. There are some conditions that result in or are related to high blood pressure.  What increases the risk?  Some risk factors for high blood pressure are under your control. The following factors may make you more likely to develop this condition:  Smoking.  Having type 2 diabetes mellitus, high cholesterol, or both.  Not getting enough exercise or physical activity.  Being overweight.  Having too much fat, sugar, calories, or salt (sodium) in your diet.  Drinking too much alcohol.  Some risk factors for high blood pressure may be difficult or impossible to change. Some of these factors include:  Having chronic kidney disease.  Having a family history of high blood pressure.  Age. Risk increases with age.  Race. You may be at higher risk if you are .  Gender. Men are at higher risk than women before age 45. After age 65, women are at higher risk than men.  Having obstructive sleep apnea.  Stress.  What are the signs or symptoms?  High blood pressure may not cause symptoms. Very high blood pressure " (hypertensive crisis) may cause:  Headache.  Anxiety.  Shortness of breath.  Nosebleed.  Nausea and vomiting.  Vision changes.  Severe chest pain.  Seizures.  How is this diagnosed?  This condition is diagnosed by measuring your blood pressure while you are seated, with your arm resting on a flat surface, your legs uncrossed, and your feet flat on the floor. The cuff of the blood pressure monitor will be placed directly against the skin of your upper arm at the level of your heart. It should be measured at least twice using the same arm. Certain conditions can cause a difference in blood pressure between your right and left arms.  Certain factors can cause blood pressure readings to be lower or higher than normal for a short period of time:  When your blood pressure is higher when you are in a health care provider's office than when you are at home, this is called white coat hypertension. Most people with this condition do not need medicines.  When your blood pressure is higher at home than when you are in a health care provider's office, this is called masked hypertension. Most people with this condition may need medicines to control blood pressure.  If you have a high blood pressure reading during one visit or you have normal blood pressure with other risk factors, you may be asked to:  Return on a different day to have your blood pressure checked again.  Monitor your blood pressure at home for 1 week or longer.  If you are diagnosed with hypertension, you may have other blood or imaging tests to help your health care provider understand your overall risk for other conditions.  How is this treated?  This condition is treated by making healthy lifestyle changes, such as eating healthy foods, exercising more, and reducing your alcohol intake. Your health care provider may prescribe medicine if lifestyle changes are not enough to get your blood pressure under control, and if:  Your systolic blood pressure is above  130.  Your diastolic blood pressure is above 80.  Your personal target blood pressure may vary depending on your medical conditions, your age, and other factors.  Follow these instructions at home:  Eating and drinking    Eat a diet that is high in fiber and potassium, and low in sodium, added sugar, and fat. An example eating plan is called the DASH (Dietary Approaches to Stop Hypertension) diet. To eat this way:  Eat plenty of fresh fruits and vegetables. Try to fill one half of your plate at each meal with fruits and vegetables.  Eat whole grains, such as whole-wheat pasta, brown rice, or whole-grain bread. Fill about one fourth of your plate with whole grains.  Eat or drink low-fat dairy products, such as skim milk or low-fat yogurt.  Avoid fatty cuts of meat, processed or cured meats, and poultry with skin. Fill about one fourth of your plate with lean proteins, such as fish, chicken without skin, beans, eggs, or tofu.  Avoid pre-made and processed foods. These tend to be higher in sodium, added sugar, and fat.  Reduce your daily sodium intake. Most people with hypertension should eat less than 1,500 mg of sodium a day.  Do not drink alcohol if:  Your health care provider tells you not to drink.  You are pregnant, may be pregnant, or are planning to become pregnant.  If you drink alcohol:  Limit how much you use to:  0-1 drink a day for women.  0-2 drinks a day for men.  Be aware of how much alcohol is in your drink. In the [place] one drink equals one 12 oz bottle of beer (355 mL), one 5 oz glass of wine (148 mL), or one 1½ oz glass of hard liquor (44 mL).  Lifestyle    Work with your health care provider to maintain a healthy body weight or to lose weight. Ask what an ideal weight is for you.  Get at least 30 minutes of exercise most days of the week. Activities may include walking, swimming, or biking.  Include exercise to strengthen your muscles (resistance exercise), such as Pilates or lifting weights, as  part of your weekly exercise routine. Try to do these types of exercises for 30 minutes at least 3 days a week.  Do not use any products that contain nicotine or tobacco, such as cigarettes, e-cigarettes, and chewing tobacco. If you need help quitting, ask your health care provider.  Monitor your blood pressure at home as told by your health care provider.  Keep all follow-up visits as told by your health care provider. This is important.  Medicines  Take over-the-counter and prescription medicines only as told by your health care provider. Follow directions carefully. Blood pressure medicines must be taken as prescribed.  Do not skip doses of blood pressure medicine. Doing this puts you at risk for problems and can make the medicine less effective.  Ask your health care provider about side effects or reactions to medicines that you should watch for.  Contact a health care provider if you:  Think you are having a reaction to a medicine you are taking.  Have headaches that keep coming back (recurring).  Feel dizzy.  Have swelling in your ankles.  Have trouble with your vision.  Get help right away if you:  Develop a severe headache or confusion.  Have unusual weakness or numbness.  Feel faint.  Have severe pain in your chest or abdomen.  Vomit repeatedly.  Have trouble breathing.  Summary  Hypertension is when the force of blood pumping through your arteries is too strong. If this condition is not controlled, it may put you at risk for serious complications.  Your personal target blood pressure may vary depending on your medical conditions, your age, and other factors. For most people, a normal blood pressure is less than 120/80.  Hypertension is treated with lifestyle changes, medicines, or a combination of both. Lifestyle changes include losing weight, eating a healthy, low-sodium diet, exercising more, and limiting alcohol.  This information is not intended to replace advice given to you by your health care  provider. Make sure you discuss any questions you have with your health care provider.  Document Revised: 08/28/2019 Document Reviewed: 08/28/2019  [employer] Patient Education © 2022 [employer] High Cholesterol  High cholesterol is a condition in which the blood has high levels of a white, waxy substance similar to fat (cholesterol). The liver makes all the cholesterol that the body needs. The human body needs small amounts of cholesterol to help build cells. A person gets extra or excess cholesterol from the food that he or she eats.  The blood carries cholesterol from the liver to the rest of the body. If you have high cholesterol, deposits (plaques) may build up on the walls of your arteries. Arteries are the blood vessels that carry blood away from your heart. These plaques make the arteries narrow and stiff.  Cholesterol plaques increase your risk for heart attack and stroke. Work with your health care provider to keep your cholesterol levels in a healthy range.  What increases the risk?  The following factors may make you more likely to develop this condition:  Eating foods that are high in animal fat (saturated fat) or cholesterol.  Being overweight.  Not getting enough exercise.  A family history of high cholesterol (familial hypercholesterolemia).  Use of tobacco products.  Having diabetes.  What are the signs or symptoms?  In most cases, high cholesterol does not usually cause any symptoms.  In severe cases, very high cholesterol levels can cause:  Fatty bumps under the skin (xanthomas).  A white or gray ring around the black center (pupil) of the eye.  How is this diagnosed?  This condition may be diagnosed based on the results of a blood test.  If you are older than 20 years of age, your health care provider may check your cholesterol levels every 4-6 years.  You may be checked more often if you have high cholesterol or other risk factors for heart disease.  The blood test for cholesterol  "measures:  \"Bad\" cholesterol, or LDL cholesterol. This is the main type of cholesterol that causes heart disease. The desired level is less than 100 mg/dL (2.59 mmol/L).  \"Good\" cholesterol, or HDL cholesterol. HDL helps protect against heart disease by cleaning the arteries and carrying the LDL to the liver for processing. The desired level for HDL is 60 mg/dL (1.55 mmol/L) or higher.  Triglycerides. These are fats that your body can store or burn for energy. The desired level is less than 150 mg/dL (1.69 mmol/L).  Total cholesterol. This measures the total amount of cholesterol in your blood and includes LDL, HDL, and triglycerides. The desired level is less than 200 mg/dL (5.17 mmol/L).  How is this treated?  Treatment for high cholesterol starts with lifestyle changes, such as diet and exercise.  Diet changes. You may be asked to eat foods that have more fiber and less saturated fats or added sugar.  Lifestyle changes. These may include regular exercise, maintaining a healthy weight, and quitting use of tobacco products.  Medicines. These are given when diet and lifestyle changes have not worked. You may be prescribed a statin medicine to help lower your cholesterol levels.  Follow these instructions at home:  Eating and drinking    Eat a healthy, balanced diet. This diet includes:  Daily servings of a variety of fresh, frozen, or canned fruits and vegetables.  Daily servings of whole grain foods that are rich in fiber.  Foods that are low in saturated fats and trans fats. These include poultry and fish without skin, lean cuts of meat, and low-fat dairy products.  A variety of fish, especially oily fish that contain omega-3 fatty acids. Aim to eat fish at least 2 times a week.  Avoid foods and drinks that have added sugar.  Use healthy cooking methods, such as roasting, grilling, broiling, baking, poaching, steaming, and stir-frying. Do not hutton your food except for stir-frying.  If you drink alcohol:  Limit how " "much you have to:  0-1 drink a day for women who are not pregnant.  0-2 drinks a day for men.  Know how much alcohol is in a drink. In the [place] one drink equals one 12 oz bottle of beer (355 mL), one 5 oz glass of wine (148 mL), or one 1½ oz glass of hard liquor (44 mL).  Lifestyle    Get regular exercise. Aim to exercise for a total of 150 minutes a week. Increase your activity level by doing activities such as gardening, walking, and taking the stairs.  Do not use any products that contain nicotine or tobacco. These products include cigarettes, chewing tobacco, and vaping devices, such as e-cigarettes. If you need help quitting, ask your health care provider.  General instructions  Take over-the-counter and prescription medicines only as told by your health care provider.  Keep all follow-up visits. This is important.  Where to find more information  American Heart Association: www.heart.org  National Heart, Lung, and [facility] [place]: www.nhlbi.nih.gov  Contact a health care provider if:  You have trouble achieving or maintaining a healthy diet or weight.  You are starting an exercise program.  You are unable to stop smoking.  Get help right away if:  You have chest pain.  You have trouble breathing.  You have discomfort or pain in your jaw, neck, back, shoulder, or arm.  You have any symptoms of a stroke. \"BE FAST\" is an easy way to remember the main warning signs of a stroke:  B - Balance. Signs are dizziness, sudden trouble walking, or loss of balance.  E - Eyes. Signs are trouble seeing or a sudden change in vision.  F - Face. Signs are sudden weakness or numbness of the face, or the face or eyelid drooping on one side.  A - Arms. Signs are weakness or numbness in an arm. This happens suddenly and usually on one side of the body.  S - Speech. Signs are sudden trouble speaking, slurred speech, or trouble understanding what people say.  T - Time. Time to call emergency services. Write down what time symptoms " started.  You have other signs of a stroke, such as:  A sudden, severe headache with no known cause.  Nausea or vomiting.  Seizure.  These symptoms may represent a serious problem that is an emergency. Do not wait to see if the symptoms will go away. Get medical help right away. Call your local emergency services (911 in the [place] Do not drive yourself to the hospital.  Summary  Cholesterol plaques increase your risk for heart attack and stroke. Work with your health care provider to keep your cholesterol levels in a healthy range.  Eat a healthy, balanced diet, get regular exercise, and maintain a healthy weight.  Do not use any products that contain nicotine or tobacco. These products include cigarettes, chewing tobacco, and vaping devices, such as e-cigarettes.  Get help right away if you have any symptoms of a stroke.  This information is not intended to replace advice given to you by your health care provider. Make sure you discuss any questions you have with your health care provider.  Document Revised: 03/03/2022 Document Reviewed: 02/21/2022  [employer] Patient Education © 2022 [employer] Gastroesophageal Reflux Disease, Adult  Gastroesophageal reflux (ISELA) happens when acid from the stomach flows up into the tube that connects the mouth and the stomach (esophagus). Normally, food travels down the esophagus and stays in the stomach to be digested. With ISELA, food and stomach acid sometimes move back up into the esophagus.  You may have a disease called gastroesophageal reflux disease (GERD) if the reflux:  Happens often.  Causes frequent or very bad symptoms.  Causes problems such as damage to the esophagus.  When this happens, the esophagus becomes sore and swollen. Over time, GERD can make small holes (ulcers) in the lining of the esophagus.  What are the causes?  This condition is caused by a problem with the muscle between the esophagus and the stomach. When this muscle is weak or not normal, it does  not close properly to keep food and acid from coming back up from the stomach.  The muscle can be weak because of:  Tobacco use.  Pregnancy.  Having a certain type of hernia (hiatal hernia).  Alcohol use.  Certain foods and drinks, such as coffee, chocolate, onions, and peppermint.  What increases the risk?  Being overweight.  Having a disease that affects your connective tissue.  Taking NSAIDs, such a ibuprofen.  What are the signs or symptoms?  Heartburn.  Difficult or painful swallowing.  The feeling of having a lump in the throat.  A bitter taste in the mouth.  Bad breath.  Having a lot of saliva.  Having an upset or bloated stomach.  Burping.  Chest pain. Different conditions can cause chest pain. Make sure you see your doctor if you have chest pain.  Shortness of breath or wheezing.  A long-term cough or a cough at night.  Wearing away of the surface of teeth (tooth enamel).  Weight loss.  How is this treated?  Making changes to your diet.  Taking medicine.  Having surgery.  Treatment will depend on how bad your symptoms are.  Follow these instructions at home:  Eating and drinking    Follow a diet as told by your doctor. You may need to avoid foods and drinks such as:  Coffee and tea, with or without caffeine.  Drinks that contain alcohol.  Energy drinks and sports drinks.  Bubbly (carbonated) drinks or sodas.  Chocolate and cocoa.  Peppermint and mint flavorings.  Garlic and onions.  Horseradish.  Spicy and acidic foods. These include peppers, chili powder, bello powder, vinegar, hot sauces, and BBQ sauce.  Citrus fruit juices and citrus fruits, such as oranges, los, and limes.  Tomato-based foods. These include red sauce, chili, salsa, and pizza with red sauce.  Fried and fatty foods. These include donuts, french fries, potato chips, and high-fat dressings.  High-fat meats. These include hot dogs, rib eye steak, sausage, ham, and croft.  High-fat dairy items, such as whole milk, butter, and cream  cheese.  Eat small meals often. Avoid eating large meals.  Avoid drinking large amounts of liquid with your meals.  Avoid eating meals during the 2-3 hours before bedtime.  Avoid lying down right after you eat.  Do not exercise right after you eat.  Lifestyle    Do not smoke or use any products that contain nicotine or tobacco. If you need help quitting, ask your doctor.  Try to lower your stress. If you need help doing this, ask your doctor.  If you are overweight, lose an amount of weight that is healthy for you. Ask your doctor about a safe weight loss goal.  General instructions  Pay attention to any changes in your symptoms.  Take over-the-counter and prescription medicines only as told by your doctor.  Do not take aspirin, ibuprofen, or other NSAIDs unless your doctor says it is okay.  Wear loose clothes. Do not wear anything tight around your waist.  Raise (elevate) the head of your bed about 6 inches (15 cm). You may need to use a wedge to do this.  Avoid bending over if this makes your symptoms worse.  Keep all follow-up visits.  Contact a doctor if:  You have new symptoms.  You lose weight and you do not know why.  You have trouble swallowing or it hurts to swallow.  You have wheezing or a cough that keeps happening.  You have a hoarse voice.  Your symptoms do not get better with treatment.  Get help right away if:  You have sudden pain in your arms, neck, jaw, teeth, or back.  You suddenly feel sweaty, dizzy, or light-headed.  You have chest pain or shortness of breath.  You vomit and the vomit is green, yellow, or black, or it looks like blood or coffee grounds.  You faint.  Your poop (stool) is red, bloody, or black.  You cannot swallow, drink, or eat.  These symptoms may represent a serious problem that is an emergency. Do not wait to see if the symptoms will go away. Get medical help right away. Call your local emergency services (911 in the [place] Do not drive yourself to the hospital.  Summary  If a  person has gastroesophageal reflux disease (GERD), food and stomach acid move back up into the esophagus and cause symptoms or problems such as damage to the esophagus.  Treatment will depend on how bad your symptoms are.  Follow a diet as told by your doctor.  Take all medicines only as told by your doctor.  This information is not intended to replace advice given to you by your health care provider. Make sure you discuss any questions you have with your health care provider.  Document Revised: 06/28/2021 Document Reviewed: 06/28/2021  [employer] Patient Education © 2022 [employer]

## 2023-03-14 NOTE — TELEPHONE ENCOUNTER
Pt called to check on lab results from yesterday. Per Dora Salazar to tell him results look fine. Told pt to call back if he had any questions.

## 2023-03-17 ENCOUNTER — TELEPHONE (OUTPATIENT)
Dept: FAMILY MEDICINE CLINIC | Facility: CLINIC | Age: 78
End: 2023-03-17
Payer: MEDICARE

## 2023-04-07 ENCOUNTER — TELEPHONE (OUTPATIENT)
Dept: FAMILY MEDICINE CLINIC | Facility: CLINIC | Age: 78
End: 2023-04-07
Payer: MEDICARE

## 2023-04-07 DIAGNOSIS — K21.9 GASTROESOPHAGEAL REFLUX DISEASE, UNSPECIFIED WHETHER ESOPHAGITIS PRESENT: ICD-10-CM

## 2023-04-07 NOTE — TELEPHONE ENCOUNTER
Caller: Steve Santoyo    Relationship: Self    Best call back number: [phone]    Requested Prescriptions:   Requested Prescriptions      No prescriptions requested or ordered in this encounter      esomeprazole (nexIUM) 40 MG capsule    Pharmacy where request should be sent:     [facility]/pharmacy #[id number] - [place], [place] - [address] [phone] [facility] [phone]         Last office visit with prescribing clinician: 3/13/2023   Last telemedicine visit with prescribing clinician: 6/14/2023   Next office visit with prescribing clinician: 6/14/2023     Additional details provided by patient:     CAN PATIENT HAVE 90 DAY SUPPLY?    Does the patient have less than a 3 day supply:    [x] Yes  [] No    Would you like a call back once the refill request has been completed: [] Yes [x] No    If the office needs to give you a call back, can they leave a voicemail: [] Yes [x] No    Mary Harris, PCT   04/07/23 11:30 EDT

## 2023-04-10 RX ORDER — ESOMEPRAZOLE MAGNESIUM 40 MG/1
40 CAPSULE, DELAYED RELEASE ORAL
Qty: 90 CAPSULE | Refills: 1 | Status: SHIPPED | OUTPATIENT
Start: 2023-04-10

## 2023-04-24 RX ORDER — LEVOTHYROXINE SODIUM 0.1 MG/1
TABLET ORAL
Qty: 90 TABLET | Refills: 3 | Status: SHIPPED | OUTPATIENT
Start: 2023-04-24

## 2023-05-02 ENCOUNTER — PROCEDURE VISIT (OUTPATIENT)
Dept: FAMILY MEDICINE CLINIC | Facility: CLINIC | Age: 78
End: 2023-05-02
Payer: MEDICARE

## 2023-05-02 VITALS
SYSTOLIC BLOOD PRESSURE: 140 MMHG | TEMPERATURE: 97.5 F | DIASTOLIC BLOOD PRESSURE: 60 MMHG | OXYGEN SATURATION: 98 % | HEART RATE: 74 BPM | BODY MASS INDEX: 30.21 KG/M2 | HEIGHT: 70 IN | WEIGHT: 211 LBS | RESPIRATION RATE: 18 BRPM

## 2023-05-02 DIAGNOSIS — S60.459A SPLINTER OF FINGER WITHOUT MAJOR OPEN WOUND OR INFECTION, INITIAL ENCOUNTER: Primary | ICD-10-CM

## 2023-05-02 RX ORDER — SULFAMETHOXAZOLE AND TRIMETHOPRIM 800; 160 MG/1; MG/1
1 TABLET ORAL 2 TIMES DAILY
Qty: 14 TABLET | Refills: 0 | Status: SHIPPED | OUTPATIENT
Start: 2023-05-02 | End: 2023-05-09

## 2023-05-02 NOTE — PROGRESS NOTES
"Chief Complaint  Foreign Body in Skin (Pt states he has splinter in right thumb. )    Subjective        Steve Santoyo presents to [facility]  History of Present Illness  The patient is a 76 yo male who is here today because of a possible splinter on his right thumb for 4-5 days. The patient was working with a steel wool when he felt the poke on his thumb. He was trying to pull it out but he thinks he is missing it because he cannot see good. Now with mild redness and swelling.      Objective   Vital Signs:  /60 (BP Location: Left arm, Patient Position: Sitting, Cuff Size: Adult)   Pulse 74   Temp 97.5 °F (36.4 °C) (Temporal)   Resp 18   Ht 177.8 cm (70\")   Wt 95.7 kg (211 lb)   SpO2 98%   BMI 30.28 kg/m²   Estimated body mass index is 30.28 kg/m² as calculated from the following:    Height as of this encounter: 177.8 cm (70\").    Weight as of this encounter: 95.7 kg (211 lb).       BMI is >= 30 and <35. (Class 1 Obesity). The following options were offered after discussion;: exercise counseling/recommendations and nutrition counseling/recommendations      Physical Exam  Vitals and nursing note reviewed.   Constitutional:       General: He is not in acute distress.     Appearance: Normal appearance. He is well-developed and well-groomed.   HENT:      Head: Normocephalic and atraumatic.      Jaw: No tenderness or pain on movement.      Salivary Glands: Right salivary gland is not diffusely enlarged. Left salivary gland is not diffusely enlarged.      Right Ear: Tympanic membrane and ear canal normal.      Left Ear: Tympanic membrane and ear canal normal.      Nose: No congestion or rhinorrhea.      Mouth/Throat:      Mouth: Mucous membranes are moist.      Pharynx: No oropharyngeal exudate or posterior oropharyngeal erythema.   Eyes:      General: Lids are normal. No allergic shiner or scleral icterus.     Conjunctiva/sclera: Conjunctivae normal.      Pupils: Pupils are " equal, round, and reactive to light.   Neck:      Thyroid: No thyroid mass, thyromegaly or thyroid tenderness.      Trachea: Trachea normal.   Cardiovascular:      Rate and Rhythm: Normal rate and regular rhythm.  No extrasystoles are present.     Pulses: Normal pulses.      Heart sounds: Normal heart sounds. No murmur heard.  Pulmonary:      Effort: Pulmonary effort is normal. No respiratory distress.      Breath sounds: Normal breath sounds. No decreased breath sounds, wheezing, rhonchi or rales.   Chest:   Breasts:     Right: Normal.      Left: Normal.   Abdominal:      General: Bowel sounds are normal.      Palpations: Abdomen is soft.      Tenderness: There is no abdominal tenderness. There is no right CVA tenderness, left CVA tenderness, guarding or rebound.   Musculoskeletal:      Cervical back: Neck supple.      Right lower leg: No edema.      Left lower leg: No edema.   Lymphadenopathy:      Cervical: No cervical adenopathy.      Upper Body:      Right upper body: No supraclavicular or axillary adenopathy.      Left upper body: No supraclavicular or axillary adenopathy.   Skin:     General: Skin is warm.      Nails: There is no clubbing.      Comments: Right thumb with metal strand splinter, removed with forcep, mild erythema and mils swelling, no bleeding, non tender.   Neurological:      General: No focal deficit present.      Mental Status: He is alert and oriented to person, place, and time.      Sensory: Sensation is intact.      Motor: Motor function is intact.      Coordination: Coordination is intact.   Psychiatric:         Attention and Perception: Attention and perception normal.         Mood and Affect: Mood normal.         Speech: Speech normal.         Behavior: Behavior normal. Behavior is cooperative.         Thought Content: Thought content normal.        Result Review :            Foreign Body Removal    Date/Time: 5/2/2023 2:17 PM  Performed by: Waqar Blood MD  Authorized by: Jeremi  Waqar MADERA MD   Body area: skin  General location: upper extremity  Location details: right thumb    Sedation:  Patient sedated: no    Patient restrained: no  Patient cooperative: yes  Localization method: visualized  Removal mechanism: forceps  Dressing: antibiotic ointment  Tendon involvement: none  Depth: subcutaneous  Complexity: simple  1 objects recovered.  Objects recovered: metal strand  Post-procedure assessment: foreign body removed  Patient tolerance: patient tolerated the procedure well with no immediate complications          Assessment and Plan   Diagnoses and all orders for this visit:    1. Splinter of finger without major open wound or infection, initial encounter (Primary)  -     sulfamethoxazole-trimethoprim (Bactrim DS) 800-160 MG per tablet; Take 1 tablet by mouth 2 (Two) Times a Day for 7 days.  Dispense: 14 tablet; Refill: 0           I spent 20 minutes caring for Steve on this date of service. This time includes time spent by me in the following activities:preparing for the visit, performing a medically appropriate examination and/or evaluation , counseling and educating the patient/family/caregiver, ordering medications, tests, or procedures and documenting information in the medical record       Follow Up   Return if symptoms worsen or fail to improve.  Patient was given instructions and counseling regarding his condition or for health maintenance advice. Please see specific information pulled into the AVS if appropriate.     The patient is advised to continue all of his regular medications as prescribed. He was counseled regarding the importance of diet, exercise and medication compliance.    RTC if symptoms worsen or fail to improve.      This document has been electronically signed by Waqar Blood MD  May 2, 2023 15:03 EDT

## 2023-06-14 ENCOUNTER — OFFICE VISIT (OUTPATIENT)
Dept: FAMILY MEDICINE CLINIC | Facility: CLINIC | Age: 78
End: 2023-06-14
Payer: MEDICARE

## 2023-06-14 VITALS
HEIGHT: 70 IN | WEIGHT: 211 LBS | OXYGEN SATURATION: 98 % | SYSTOLIC BLOOD PRESSURE: 138 MMHG | HEART RATE: 62 BPM | RESPIRATION RATE: 18 BRPM | BODY MASS INDEX: 30.21 KG/M2 | DIASTOLIC BLOOD PRESSURE: 72 MMHG

## 2023-06-14 DIAGNOSIS — N52.9 ERECTILE DYSFUNCTION, UNSPECIFIED ERECTILE DYSFUNCTION TYPE: ICD-10-CM

## 2023-06-14 DIAGNOSIS — Z00.00 ENCOUNTER FOR SUBSEQUENT ANNUAL WELLNESS VISIT (AWV) IN MEDICARE PATIENT: Primary | ICD-10-CM

## 2023-06-14 DIAGNOSIS — K21.9 GASTROESOPHAGEAL REFLUX DISEASE, UNSPECIFIED WHETHER ESOPHAGITIS PRESENT: ICD-10-CM

## 2023-06-14 PROCEDURE — 1159F MED LIST DOCD IN RCRD: CPT | Performed by: NURSE PRACTITIONER

## 2023-06-14 PROCEDURE — 1160F RVW MEDS BY RX/DR IN RCRD: CPT | Performed by: NURSE PRACTITIONER

## 2023-06-14 PROCEDURE — 1170F FXNL STATUS ASSESSED: CPT | Performed by: NURSE PRACTITIONER

## 2023-06-14 PROCEDURE — 3075F SYST BP GE 130 - 139MM HG: CPT | Performed by: NURSE PRACTITIONER

## 2023-06-14 PROCEDURE — G0439 PPPS, SUBSEQ VISIT: HCPCS | Performed by: NURSE PRACTITIONER

## 2023-06-14 PROCEDURE — 3078F DIAST BP <80 MM HG: CPT | Performed by: NURSE PRACTITIONER

## 2023-06-14 RX ORDER — ESOMEPRAZOLE MAGNESIUM 40 MG/1
40 CAPSULE, DELAYED RELEASE ORAL
Qty: 90 CAPSULE | Refills: 1 | Status: SHIPPED | OUTPATIENT
Start: 2023-06-14

## 2023-06-14 RX ORDER — SILDENAFIL 50 MG/1
50 TABLET, FILM COATED ORAL DAILY PRN
Qty: 90 TABLET | Refills: 1 | Status: SHIPPED | OUTPATIENT
Start: 2023-06-14

## 2023-06-14 NOTE — PROGRESS NOTES
The ABCs of the Annual Wellness Visit  Subsequent Medicare Wellness Visit    Subjective      Steve Santoyo is a 77 y.o. male who presents for a Subsequent Medicare Wellness Visit.    The following portions of the patient's history were reviewed and   updated as appropriate: allergies, current medications, past family history, past medical history, past social history, past surgical history, and problem list.    Compared to one year ago, the patient feels his physical   health is the same.    Compared to one year ago, the patient feels his mental   health is the same.    Recent Hospitalizations:  He was not admitted to the hospital during the last year.       Current Medical Providers:  Patient Care Team:  Dora Salazar APRN as PCP - General (Family Medicine)    Outpatient Medications Prior to Visit   Medication Sig Dispense Refill   • amLODIPine (NORVASC) 10 MG tablet Take 1 tablet by mouth Daily. 90 tablet 3   • aspirin 81 MG EC tablet Take 1 tablet by mouth Daily. 30 tablet 3   • ezetimibe (ZETIA) 10 MG tablet Take 1 tablet by mouth Daily. 90 tablet 3   • levothyroxine (SYNTHROID, LEVOTHROID) 100 MCG tablet TAKE 1 TABLET BY MOUTH EVERY DAY ON EMPTY STOMACH IN THE MORNING ORALLY ONCE A DAY 90 DAYS 90 tablet 3   • metoprolol tartrate (LOPRESSOR) 50 MG tablet Take 1 tablet by mouth 2 (Two) Times a Day. 180 tablet 3   • nitroglycerin (NITROSTAT) 0.4 MG SL tablet 1 under the tongue as needed for angina, may repeat q5mins for up three doses 25 tablet 1   • rosuvastatin (CRESTOR) 10 MG tablet Take 1 tablet by mouth Daily. 90 tablet 3   • esomeprazole (nexIUM) 40 MG capsule Take 1 capsule by mouth Every Morning Before Breakfast. 90 capsule 1   • sildenafil (VIAGRA) 50 MG tablet Take 1 tablet by mouth Daily As Needed for Erectile Dysfunction. 90 tablet 1     No facility-administered medications prior to visit.       No opioid medication identified on active medication list. I have reviewed chart for other potential   "high risk medication/s and harmful drug interactions in the elderly.          Aspirin is on active medication list. Aspirin use is indicated based on review of current medical condition/s. Pros and cons of this therapy have been discussed today. Benefits of this medication outweigh potential harm.  Patient has been encouraged to continue taking this medication.  .      Patient Active Problem List   Diagnosis   • IHD (ischemic heart disease)   • Essential hypertension   • Hypercholesteremia   • Hypothyroidism   • Metabolic syndrome   • Shortness of breath     Advance Care Planning   Advance Care Planning     Advance Directive is not on file.  ACP discussion was declined by the patient. Patient does not have an advance directive, declines further assistance.     Objective    Vitals:    23 1248   BP: 138/72   Pulse: 62   Resp: 18   SpO2: 98%   Weight: 95.7 kg (211 lb)   Height: 177.8 cm (70\")     Estimated body mass index is 30.28 kg/m² as calculated from the following:    Height as of this encounter: 177.8 cm (70\").    Weight as of this encounter: 95.7 kg (211 lb).           Does the patient have evidence of cognitive impairment?   No            HEALTH RISK ASSESSMENT    Smoking Status:  Social History     Tobacco Use   Smoking Status Former   • Packs/day: 1.00   • Years: 15.00   • Pack years: 15.00   • Types: Cigarettes   • Quit date: 1986   • Years since quittin.4   Smokeless Tobacco Never     Alcohol Consumption:  Social History     Substance and Sexual Activity   Alcohol Use No     Fall Risk Screen:    ELBAADI Fall Risk Assessment was completed, and patient is at LOW risk for falls.Assessment completed on:2023    Depression Screenin/14/2023    12:43 PM   PHQ-2/PHQ-9 Depression Screening   Little Interest or Pleasure in Doing Things 1-->several days   Feeling Down, Depressed or Hopeless 0-->not at all   PHQ-9: Brief Depression Severity Measure Score 1       Health Habits and Functional " and Cognitive Screenin/14/2023    12:48 PM   Functional & Cognitive Status   Do you have difficulty preparing food and eating? No   Do you have difficulty bathing yourself, getting dressed or grooming yourself? Yes   Do you have difficulty using the toilet? No   Do you have difficulty moving around from place to place? Yes   Do you have trouble with steps or getting out of a bed or a chair? Yes   Current Diet Other   Dental Exam Up to date   Eye Exam Up to date   Exercise (times per week) 3 times per week   Current Exercises Include House Cleaning;Yard Work   Do you need help using the phone?  No   Are you deaf or do you have serious difficulty hearing?  Yes   Do you need help with transportation? No   Do you need help shopping? No   Do you need help preparing meals?  No   Do you need help with housework?  No   Do you need help with laundry? No   Do you need help taking your medications? No   Do you need help managing money? No   Do you ever drive or ride in a car without wearing a seat belt? No   Have you felt unusual stress, anger or loneliness in the last month? No   Who do you live with? Alone   If you need help, do you have trouble finding someone available to you? No   Have you been bothered in the last four weeks by sexual problems? No   Do you have difficulty concentrating, remembering or making decisions? No       Age-appropriate Screening Schedule:  Refer to the list below for future screening recommendations based on patient's age, sex and/or medical conditions. Orders for these recommended tests are listed in the plan section. The patient has been provided with a written plan.    Health Maintenance   Topic Date Due   • ZOSTER VACCINE (1 of 2) 2024 (Originally 1995)   • INFLUENZA VACCINE  10/01/2023   • LIPID PANEL  2024   • ANNUAL WELLNESS VISIT  2024   • COLORECTAL CANCER SCREENING  09/15/2030   • TDAP/TD VACCINES (2 - Td or Tdap) 2033   • HEPATITIS C SCREENING   Completed   • Pneumococcal Vaccine 65+  Completed   • COVID-19 Vaccine  Discontinued                  CMS Preventative Services Quick Reference  Risk Factors Identified During Encounter:    Polypharmacy: Medication List reviewed    The above risks/problems have been discussed with the patient.  Pertinent information has been shared with the patient in the After Visit Summary.    Diagnoses and all orders for this visit:    1. Encounter for subsequent annual wellness visit (AWV) in Medicare patient (Primary)    2. Gastroesophageal reflux disease, unspecified whether esophagitis present  -     esomeprazole (nexIUM) 40 MG capsule; Take 1 capsule by mouth Every Morning Before Breakfast.  Dispense: 90 capsule; Refill: 1    3. Erectile dysfunction, unspecified erectile dysfunction type  -     sildenafil (VIAGRA) 50 MG tablet; Take 1 tablet by mouth Daily As Needed for Erectile Dysfunction.  Dispense: 90 tablet; Refill: 1    The preventive exam has been reviewed in detail.  The patient has been fully counseled on preventative guidelines for vaccines, cancer screenings, and other health maintenance needs.   The patient has been counseled on guidelines for maintaining a lifestyle to promote good health and to minimize chronic diseases.  The patient has been assisted with scheduling these healthcare procedures for the coming year and given a written document of health maintenance and anticipatory guidance for age with the AVS.     I spent 30 minutes caring for Steve on this date of service. This time includes time spent by me in the following activities:preparing for the visit, reviewing tests, obtaining and/or reviewing a separately obtained history, performing a medically appropriate examination and/or evaluation , counseling and educating the patient/family/caregiver, ordering medications, tests, or procedures, referring and communicating with other health care professionals , documenting information in the medical record,  independently interpreting results and communicating that information with the patient/family/caregiver and care coordination     Follow Up:   Next Medicare Wellness visit to be scheduled in 1 year.      An After Visit Summary and PPPS were made available to the patient.        This document has been electronically signed by MARTHA Badillo  Rylee 15, 2023 06:30 EDT

## 2023-09-13 RX ORDER — AMLODIPINE BESYLATE 10 MG/1
10 TABLET ORAL DAILY
Qty: 90 TABLET | Refills: 3
Start: 2023-09-13

## 2023-09-13 NOTE — TELEPHONE ENCOUNTER
Caller: Steve Santoyo    Relationship: Self    Best call back number: [phone]    Requested Prescriptions:   Requested Prescriptions     Pending Prescriptions Disp Refills    amLODIPine (NORVASC) 10 MG tablet 90 tablet 3     Sig: Take 1 tablet by mouth Daily.        Pharmacy where request should be sent: [facility] #[id number] - [facility] [place] - [address] [address] - [phone] [facility] [phone] FX     Last office visit with prescribing clinician: 2023   Last telemedicine visit with prescribing clinician: Visit date not found   Next office visit with prescribing clinician: 11/15/2023     Additional details provided by patient: PRESCRIPTION ,NEW PRESCRIPTION     Does the patient have less than a 3 day supply:  [x] Yes  [] No    Would you like a call back once the refill request has been completed: [] Yes [x] No    If the office needs to give you a call back, can they leave a voicemail: [] Yes [x] No    Kedar Ramirez Rep   23 11:32 EDT

## 2023-09-19 DIAGNOSIS — I10 ESSENTIAL HYPERTENSION: Primary | ICD-10-CM

## 2023-09-19 DIAGNOSIS — I10 ESSENTIAL HYPERTENSION: ICD-10-CM

## 2023-09-19 RX ORDER — AMLODIPINE BESYLATE 10 MG/1
TABLET ORAL
Qty: 90 TABLET | Refills: 3 | Status: SHIPPED | OUTPATIENT
Start: 2023-09-19

## 2023-09-19 RX ORDER — AMLODIPINE BESYLATE 10 MG/1
10 TABLET ORAL DAILY
Qty: 90 TABLET | Refills: 3
Start: 2023-09-19 | End: 2023-09-19

## 2023-11-15 ENCOUNTER — OFFICE VISIT (OUTPATIENT)
Dept: FAMILY MEDICINE CLINIC | Facility: CLINIC | Age: 78
End: 2023-11-15
Payer: MEDICARE

## 2023-11-15 VITALS
OXYGEN SATURATION: 97 % | DIASTOLIC BLOOD PRESSURE: 70 MMHG | BODY MASS INDEX: 30.21 KG/M2 | HEART RATE: 58 BPM | HEIGHT: 70 IN | SYSTOLIC BLOOD PRESSURE: 120 MMHG | WEIGHT: 211 LBS

## 2023-11-15 DIAGNOSIS — K21.9 GASTROESOPHAGEAL REFLUX DISEASE, UNSPECIFIED WHETHER ESOPHAGITIS PRESENT: ICD-10-CM

## 2023-11-15 DIAGNOSIS — Z23 ENCOUNTER FOR IMMUNIZATION: ICD-10-CM

## 2023-11-15 DIAGNOSIS — R42 VERTIGO: ICD-10-CM

## 2023-11-15 DIAGNOSIS — I10 ESSENTIAL HYPERTENSION: Primary | ICD-10-CM

## 2023-11-15 LAB
ALBUMIN SERPL-MCNC: 4.9 G/DL (ref 3.5–5.2)
ALBUMIN/GLOB SERPL: 2 G/DL
ALP SERPL-CCNC: 84 U/L (ref 39–117)
ALT SERPL W P-5'-P-CCNC: 18 U/L (ref 1–41)
ANION GAP SERPL CALCULATED.3IONS-SCNC: 11.3 MMOL/L (ref 5–15)
AST SERPL-CCNC: 20 U/L (ref 1–40)
BASOPHILS # BLD AUTO: 0.03 10*3/MM3 (ref 0–0.2)
BASOPHILS NFR BLD AUTO: 0.4 % (ref 0–1.5)
BILIRUB SERPL-MCNC: 0.6 MG/DL (ref 0–1.2)
BUN SERPL-MCNC: 21 MG/DL (ref 8–23)
BUN/CREAT SERPL: 14.7 (ref 7–25)
CALCIUM SPEC-SCNC: 9.6 MG/DL (ref 8.6–10.5)
CHLORIDE SERPL-SCNC: 104 MMOL/L (ref 98–107)
CHOLEST SERPL-MCNC: 137 MG/DL (ref 0–200)
CO2 SERPL-SCNC: 22.7 MMOL/L (ref 22–29)
CREAT SERPL-MCNC: 1.43 MG/DL (ref 0.76–1.27)
DEPRECATED RDW RBC AUTO: 46.6 FL (ref 37–54)
EGFRCR SERPLBLD CKD-EPI 2021: 49.8 ML/MIN/1.73
EOSINOPHIL # BLD AUTO: 0.2 10*3/MM3 (ref 0–0.4)
EOSINOPHIL NFR BLD AUTO: 2.5 % (ref 0.3–6.2)
ERYTHROCYTE [DISTWIDTH] IN BLOOD BY AUTOMATED COUNT: 14.4 % (ref 12.3–15.4)
GLOBULIN UR ELPH-MCNC: 2.4 GM/DL
GLUCOSE SERPL-MCNC: 108 MG/DL (ref 65–99)
HCT VFR BLD AUTO: 43.8 % (ref 37.5–51)
HDLC SERPL-MCNC: 53 MG/DL (ref 40–60)
HGB BLD-MCNC: 13.8 G/DL (ref 13–17.7)
IMM GRANULOCYTES # BLD AUTO: 0.03 10*3/MM3 (ref 0–0.05)
IMM GRANULOCYTES NFR BLD AUTO: 0.4 % (ref 0–0.5)
LDLC SERPL CALC-MCNC: 68 MG/DL (ref 0–100)
LDLC/HDLC SERPL: 1.28 {RATIO}
LYMPHOCYTES # BLD AUTO: 2.4 10*3/MM3 (ref 0.7–3.1)
LYMPHOCYTES NFR BLD AUTO: 29.5 % (ref 19.6–45.3)
MCH RBC QN AUTO: 28 PG (ref 26.6–33)
MCHC RBC AUTO-ENTMCNC: 31.5 G/DL (ref 31.5–35.7)
MCV RBC AUTO: 89 FL (ref 79–97)
MONOCYTES # BLD AUTO: 0.65 10*3/MM3 (ref 0.1–0.9)
MONOCYTES NFR BLD AUTO: 8 % (ref 5–12)
NEUTROPHILS NFR BLD AUTO: 4.82 10*3/MM3 (ref 1.7–7)
NEUTROPHILS NFR BLD AUTO: 59.2 % (ref 42.7–76)
NRBC BLD AUTO-RTO: 0 /100 WBC (ref 0–0.2)
PLATELET # BLD AUTO: 214 10*3/MM3 (ref 140–450)
PMV BLD AUTO: 10.7 FL (ref 6–12)
POTASSIUM SERPL-SCNC: 5.5 MMOL/L (ref 3.5–5.2)
PROT SERPL-MCNC: 7.3 G/DL (ref 6–8.5)
RBC # BLD AUTO: 4.92 10*6/MM3 (ref 4.14–5.8)
SODIUM SERPL-SCNC: 138 MMOL/L (ref 136–145)
TRIGL SERPL-MCNC: 82 MG/DL (ref 0–150)
TSH SERPL DL<=0.05 MIU/L-ACNC: 1.06 UIU/ML (ref 0.27–4.2)
VLDLC SERPL-MCNC: 16 MG/DL (ref 5–40)
WBC NRBC COR # BLD: 8.13 10*3/MM3 (ref 3.4–10.8)

## 2023-11-15 PROCEDURE — 85025 COMPLETE CBC W/AUTO DIFF WBC: CPT | Performed by: NURSE PRACTITIONER

## 2023-11-15 PROCEDURE — 80061 LIPID PANEL: CPT | Performed by: NURSE PRACTITIONER

## 2023-11-15 PROCEDURE — 84443 ASSAY THYROID STIM HORMONE: CPT | Performed by: NURSE PRACTITIONER

## 2023-11-15 PROCEDURE — 80053 COMPREHEN METABOLIC PANEL: CPT | Performed by: NURSE PRACTITIONER

## 2023-11-15 RX ORDER — ESOMEPRAZOLE MAGNESIUM 40 MG/1
40 CAPSULE, DELAYED RELEASE ORAL
Qty: 90 CAPSULE | Refills: 1 | Status: SHIPPED | OUTPATIENT
Start: 2023-11-15

## 2023-11-15 NOTE — PATIENT INSTRUCTIONS
"Hypertension, Adult  High blood pressure (hypertension) is when the force of blood pumping through the arteries is too strong. The arteries are the blood vessels that carry blood from the heart throughout the body. Hypertension forces the heart to work harder to pump blood and may cause arteries to become narrow or stiff. Untreated or uncontrolled hypertension can cause a heart attack, heart failure, a stroke, kidney disease, and other problems.  A blood pressure reading consists of a higher number over a lower number. Ideally, your blood pressure should be below 120/80. The first (\"top\") number is called the systolic pressure. It is a measure of the pressure in your arteries as your heart beats. The second (\"bottom\") number is called the diastolic pressure. It is a measure of the pressure in your arteries as the heart relaxes.  What are the causes?  The exact cause of this condition is not known. There are some conditions that result in or are related to high blood pressure.  What increases the risk?  Some risk factors for high blood pressure are under your control. The following factors may make you more likely to develop this condition:  Smoking.  Having type 2 diabetes mellitus, high cholesterol, or both.  Not getting enough exercise or physical activity.  Being overweight.  Having too much fat, sugar, calories, or salt (sodium) in your diet.  Drinking too much alcohol.  Some risk factors for high blood pressure may be difficult or impossible to change. Some of these factors include:  Having chronic kidney disease.  Having a family history of high blood pressure.  Age. Risk increases with age.  Race. You may be at higher risk if you are .  Gender. Men are at higher risk than women before age 45. After age 65, women are at higher risk than men.  Having obstructive sleep apnea.  Stress.  What are the signs or symptoms?  High blood pressure may not cause symptoms. Very high blood pressure " (hypertensive crisis) may cause:  Headache.  Anxiety.  Shortness of breath.  Nosebleed.  Nausea and vomiting.  Vision changes.  Severe chest pain.  Seizures.  How is this diagnosed?  This condition is diagnosed by measuring your blood pressure while you are seated, with your arm resting on a flat surface, your legs uncrossed, and your feet flat on the floor. The cuff of the blood pressure monitor will be placed directly against the skin of your upper arm at the level of your heart. It should be measured at least twice using the same arm. Certain conditions can cause a difference in blood pressure between your right and left arms.  Certain factors can cause blood pressure readings to be lower or higher than normal for a short period of time:  When your blood pressure is higher when you are in a health care provider's office than when you are at home, this is called white coat hypertension. Most people with this condition do not need medicines.  When your blood pressure is higher at home than when you are in a health care provider's office, this is called masked hypertension. Most people with this condition may need medicines to control blood pressure.  If you have a high blood pressure reading during one visit or you have normal blood pressure with other risk factors, you may be asked to:  Return on a different day to have your blood pressure checked again.  Monitor your blood pressure at home for 1 week or longer.  If you are diagnosed with hypertension, you may have other blood or imaging tests to help your health care provider understand your overall risk for other conditions.  How is this treated?  This condition is treated by making healthy lifestyle changes, such as eating healthy foods, exercising more, and reducing your alcohol intake. Your health care provider may prescribe medicine if lifestyle changes are not enough to get your blood pressure under control, and if:  Your systolic blood pressure is above  130.  Your diastolic blood pressure is above 80.  Your personal target blood pressure may vary depending on your medical conditions, your age, and other factors.  Follow these instructions at home:  Eating and drinking    Eat a diet that is high in fiber and potassium, and low in sodium, added sugar, and fat. An example eating plan is called the DASH (Dietary Approaches to Stop Hypertension) diet. To eat this way:  Eat plenty of fresh fruits and vegetables. Try to fill one half of your plate at each meal with fruits and vegetables.  Eat whole grains, such as whole-wheat pasta, brown rice, or whole-grain bread. Fill about one fourth of your plate with whole grains.  Eat or drink low-fat dairy products, such as skim milk or low-fat yogurt.  Avoid fatty cuts of meat, processed or cured meats, and poultry with skin. Fill about one fourth of your plate with lean proteins, such as fish, chicken without skin, beans, eggs, or tofu.  Avoid pre-made and processed foods. These tend to be higher in sodium, added sugar, and fat.  Reduce your daily sodium intake. Most people with hypertension should eat less than 1,500 mg of sodium a day.  Do not drink alcohol if:  Your health care provider tells you not to drink.  You are pregnant, may be pregnant, or are planning to become pregnant.  If you drink alcohol:  Limit how much you use to:  0-1 drink a day for women.  0-2 drinks a day for men.  Be aware of how much alcohol is in your drink. In the [place] one drink equals one 12 oz bottle of beer (355 mL), one 5 oz glass of wine (148 mL), or one 1½ oz glass of hard liquor (44 mL).  Lifestyle    Work with your health care provider to maintain a healthy body weight or to lose weight. Ask what an ideal weight is for you.  Get at least 30 minutes of exercise most days of the week. Activities may include walking, swimming, or biking.  Include exercise to strengthen your muscles (resistance exercise), such as Pilates or lifting weights, as  part of your weekly exercise routine. Try to do these types of exercises for 30 minutes at least 3 days a week.  Do not use any products that contain nicotine or tobacco, such as cigarettes, e-cigarettes, and chewing tobacco. If you need help quitting, ask your health care provider.  Monitor your blood pressure at home as told by your health care provider.  Keep all follow-up visits as told by your health care provider. This is important.  Medicines  Take over-the-counter and prescription medicines only as told by your health care provider. Follow directions carefully. Blood pressure medicines must be taken as prescribed.  Do not skip doses of blood pressure medicine. Doing this puts you at risk for problems and can make the medicine less effective.  Ask your health care provider about side effects or reactions to medicines that you should watch for.  Contact a health care provider if you:  Think you are having a reaction to a medicine you are taking.  Have headaches that keep coming back (recurring).  Feel dizzy.  Have swelling in your ankles.  Have trouble with your vision.  Get help right away if you:  Develop a severe headache or confusion.  Have unusual weakness or numbness.  Feel faint.  Have severe pain in your chest or abdomen.  Vomit repeatedly.  Have trouble breathing.  Summary  Hypertension is when the force of blood pumping through your arteries is too strong. If this condition is not controlled, it may put you at risk for serious complications.  Your personal target blood pressure may vary depending on your medical conditions, your age, and other factors. For most people, a normal blood pressure is less than 120/80.  Hypertension is treated with lifestyle changes, medicines, or a combination of both. Lifestyle changes include losing weight, eating a healthy, low-sodium diet, exercising more, and limiting alcohol.  This information is not intended to replace advice given to you by your health care  provider. Make sure you discuss any questions you have with your health care provider.  Document Revised: 08/28/2019 Document Reviewed: 08/28/2019  [employer] Patient Education © 2022 [employer] High Cholesterol  High cholesterol is a condition in which the blood has high levels of a white, waxy substance similar to fat (cholesterol). The liver makes all the cholesterol that the body needs. The human body needs small amounts of cholesterol to help build cells. A person gets extra or excess cholesterol from the food that he or she eats.  The blood carries cholesterol from the liver to the rest of the body. If you have high cholesterol, deposits (plaques) may build up on the walls of your arteries. Arteries are the blood vessels that carry blood away from your heart. These plaques make the arteries narrow and stiff.  Cholesterol plaques increase your risk for heart attack and stroke. Work with your health care provider to keep your cholesterol levels in a healthy range.  What increases the risk?  The following factors may make you more likely to develop this condition:  Eating foods that are high in animal fat (saturated fat) or cholesterol.  Being overweight.  Not getting enough exercise.  A family history of high cholesterol (familial hypercholesterolemia).  Use of tobacco products.  Having diabetes.  What are the signs or symptoms?  In most cases, high cholesterol does not usually cause any symptoms.  In severe cases, very high cholesterol levels can cause:  Fatty bumps under the skin (xanthomas).  A white or gray ring around the black center (pupil) of the eye.  How is this diagnosed?  This condition may be diagnosed based on the results of a blood test.  If you are older than 20 years of age, your health care provider may check your cholesterol levels every 4-6 years.  You may be checked more often if you have high cholesterol or other risk factors for heart disease.  The blood test for cholesterol  "measures:  \"Bad\" cholesterol, or LDL cholesterol. This is the main type of cholesterol that causes heart disease. The desired level is less than 100 mg/dL (2.59 mmol/L).  \"Good\" cholesterol, or HDL cholesterol. HDL helps protect against heart disease by cleaning the arteries and carrying the LDL to the liver for processing. The desired level for HDL is 60 mg/dL (1.55 mmol/L) or higher.  Triglycerides. These are fats that your body can store or burn for energy. The desired level is less than 150 mg/dL (1.69 mmol/L).  Total cholesterol. This measures the total amount of cholesterol in your blood and includes LDL, HDL, and triglycerides. The desired level is less than 200 mg/dL (5.17 mmol/L).  How is this treated?  Treatment for high cholesterol starts with lifestyle changes, such as diet and exercise.  Diet changes. You may be asked to eat foods that have more fiber and less saturated fats or added sugar.  Lifestyle changes. These may include regular exercise, maintaining a healthy weight, and quitting use of tobacco products.  Medicines. These are given when diet and lifestyle changes have not worked. You may be prescribed a statin medicine to help lower your cholesterol levels.  Follow these instructions at home:  Eating and drinking    Eat a healthy, balanced diet. This diet includes:  Daily servings of a variety of fresh, frozen, or canned fruits and vegetables.  Daily servings of whole grain foods that are rich in fiber.  Foods that are low in saturated fats and trans fats. These include poultry and fish without skin, lean cuts of meat, and low-fat dairy products.  A variety of fish, especially oily fish that contain omega-3 fatty acids. Aim to eat fish at least 2 times a week.  Avoid foods and drinks that have added sugar.  Use healthy cooking methods, such as roasting, grilling, broiling, baking, poaching, steaming, and stir-frying. Do not hutton your food except for stir-frying.  If you drink alcohol:  Limit how " "much you have to:  0-1 drink a day for women who are not pregnant.  0-2 drinks a day for men.  Know how much alcohol is in a drink. In the [place] one drink equals one 12 oz bottle of beer (355 mL), one 5 oz glass of wine (148 mL), or one 1½ oz glass of hard liquor (44 mL).  Lifestyle    Get regular exercise. Aim to exercise for a total of 150 minutes a week. Increase your activity level by doing activities such as gardening, walking, and taking the stairs.  Do not use any products that contain nicotine or tobacco. These products include cigarettes, chewing tobacco, and vaping devices, such as e-cigarettes. If you need help quitting, ask your health care provider.  General instructions  Take over-the-counter and prescription medicines only as told by your health care provider.  Keep all follow-up visits. This is important.  Where to find more information  American Heart Association: www.heart.org  National Heart, Lung, and Blood [place]: www.nhlbi.nih.gov  Contact a health care provider if:  You have trouble achieving or maintaining a healthy diet or weight.  You are starting an exercise program.  You are unable to stop smoking.  Get help right away if:  You have chest pain.  You have trouble breathing.  You have discomfort or pain in your jaw, neck, back, shoulder, or arm.  You have any symptoms of a stroke. \"BE FAST\" is an easy way to remember the main warning signs of a stroke:  B - Balance. Signs are dizziness, sudden trouble walking, or loss of balance.  E - Eyes. Signs are trouble seeing or a sudden change in vision.  F - Face. Signs are sudden weakness or numbness of the face, or the face or eyelid drooping on one side.  A - Arms. Signs are weakness or numbness in an arm. This happens suddenly and usually on one side of the body.  S - Speech. Signs are sudden trouble speaking, slurred speech, or trouble understanding what people say.  T - Time. Time to call emergency services. Write down what time symptoms " started.  You have other signs of a stroke, such as:  A sudden, severe headache with no known cause.  Nausea or vomiting.  Seizure.  These symptoms may represent a serious problem that is an emergency. Do not wait to see if the symptoms will go away. Get medical help right away. Call your local emergency services (911 in the [place] Do not drive yourself to the hospital.  Summary  Cholesterol plaques increase your risk for heart attack and stroke. Work with your health care provider to keep your cholesterol levels in a healthy range.  Eat a healthy, balanced diet, get regular exercise, and maintain a healthy weight.  Do not use any products that contain nicotine or tobacco. These products include cigarettes, chewing tobacco, and vaping devices, such as e-cigarettes.  Get help right away if you have any symptoms of a stroke.  This information is not intended to replace advice given to you by your health care provider. Make sure you discuss any questions you have with your health care provider.  Document Revised: 03/03/2022 Document Reviewed: 02/21/2022  [employer] Patient Education © 2022 [employer]

## 2023-11-15 NOTE — PROGRESS NOTES
Chief Complaint  Follow-up (6 month , HTN, GERD & ED )    Subjective        Steve Santoyo presents to [facility] for 6 month f/u (GERD; ED; HTN).    Hypertension  This is a chronic problem. The current episode started more than 1 year ago. The problem is unchanged. The problem is controlled. Associated symptoms include chest pain, malaise/fatigue, peripheral edema and shortness of breath. Pertinent negatives include no anxiety, blurred vision, headaches, neck pain, orthopnea, palpitations, PND or sweats. Agents associated with hypertension include NSAIDs. Risk factors for coronary artery disease include dyslipidemia, obesity, male gender, sedentary lifestyle and stress. Past treatments include beta blockers and calcium channel blockers. Current antihypertension treatment includes beta blockers and calcium channel blockers. The current treatment provides significant improvement. Compliance problems include diet, exercise and psychosocial issues.    Heartburn  He complains of chest pain and heartburn. He reports no abdominal pain, no belching, no choking, no coughing, no dysphagia, no early satiety, no globus sensation, no hoarse voice, no nausea, no sore throat, no stridor, no tooth decay, no water brash or no wheezing. This is a chronic problem. The current episode started more than 1 year ago. The problem occurs occasionally. The problem has been unchanged. The heartburn duration is less than a minute. The heartburn is located in the substernum. The heartburn is of mild intensity. The heartburn does not wake him from sleep. The heartburn does not limit his activity. The heartburn doesn't change with position. The symptoms are aggravated by certain foods. Associated symptoms include fatigue. Pertinent negatives include no anemia, melena, muscle weakness, orthopnea or weight loss. Risk factors include NSAIDs, lack of exercise and obesity. He has tried a PPI for the symptoms. The  "treatment provided significant relief.   Erectile Dysfunction  This is a chronic problem. The current episode started more than 1 year ago. The problem is unchanged. The nature of his difficulty is maintaining erection. Non-physiologic factors contributing to erectile dysfunction are anxiety. He reports his erection duration to be less than 1 minute. Obstructive symptoms do not include dribbling, incomplete emptying, an intermittent stream, a slower stream, straining or a weak stream. Pertinent negatives include no chills, dysuria, genital pain, hematuria, hesitancy or inability to urinate. The symptoms are aggravated by stress. Past treatments include sildenafil. The treatment provided moderate relief. He has been using treatment for 2 or more years. He has had no adverse reactions caused by medications.   Dizziness  This is a chronic problem. Episode onset: 10 years or longer. The problem occurs intermittently. The problem has been waxing and waning. Associated symptoms include arthralgias, chest pain, fatigue, numbness and vertigo. Pertinent negatives include no abdominal pain, anorexia, change in bowel habit, chills, congestion, coughing, diaphoresis, fever, headaches, joint swelling, myalgias, nausea, neck pain, rash, sore throat, swollen glands, urinary symptoms, visual change, vomiting or weakness. Exacerbated by: \"unknown\" He has tried nothing for the symptoms.     Objective   Vital Signs:  /70   Pulse 58   Ht 177.8 cm (70\")   Wt 95.7 kg (211 lb)   SpO2 97%   BMI 30.28 kg/m²   Estimated body mass index is 30.28 kg/m² as calculated from the following:    Height as of this encounter: 177.8 cm (70\").    Weight as of this encounter: 95.7 kg (211 lb).       Physical Exam  Vitals and nursing note reviewed.   Constitutional:       General: He is awake.      Appearance: Normal appearance.   HENT:      Head: Normocephalic.      Right Ear: Tympanic membrane, ear canal and external ear normal. Decreased " hearing noted.      Left Ear: Tympanic membrane, ear canal and external ear normal. Decreased hearing noted.      Nose: Nose normal.      Mouth/Throat:      Lips: Pink.      Mouth: Mucous membranes are moist.      Pharynx: Oropharynx is clear.   Eyes:      General: Lids are normal.      Conjunctiva/sclera: Conjunctivae normal.      Pupils: Pupils are equal, round, and reactive to light.   Cardiovascular:      Rate and Rhythm: Normal rate and regular rhythm.      Pulses: Normal pulses.      Heart sounds: Normal heart sounds.   Pulmonary:      Effort: Pulmonary effort is normal.      Breath sounds: Normal breath sounds.   Abdominal:      General: Abdomen is protuberant. Bowel sounds are normal.      Palpations: Abdomen is soft.      Tenderness: There is no abdominal tenderness.   Musculoskeletal:         General: Normal range of motion.      Cervical back: Normal range of motion and neck supple.      Right lower leg: No edema.      Left lower leg: No edema.   Skin:     General: Skin is warm and dry.      Capillary Refill: Capillary refill takes less than 2 seconds.   Neurological:      Mental Status: He is alert and oriented to person, place, and time.      Sensory: Sensation is intact.      Motor: Motor function is intact.      Coordination: Coordination is intact.      Gait: Gait is intact.   Psychiatric:         Attention and Perception: Attention and perception normal.         Mood and Affect: Affect normal. Mood is anxious.         Speech: Speech is rapid and pressured.         Behavior: Behavior normal. Behavior is cooperative.         Thought Content: Thought content normal.          Result Review :  The following data was reviewed by: MARTHA Badillo on 11/15/2023:  CMP          3/13/2023    14:26 11/15/2023    13:33   CMP   Glucose 113  108    BUN 19  21    Creatinine 1.38  1.43    EGFR 52.3  49.8    Sodium 142  138    Potassium 5.2  5.5    Chloride 105  104    Calcium 10.0  9.6    Total Protein 8.0  7.3     Albumin 4.8  4.9    Globulin 3.2  2.4    Total Bilirubin 0.5  0.6    Alkaline Phosphatase 91  84    AST (SGOT) 22  20    ALT (SGPT) 21  18    Albumin/Globulin Ratio 1.5  2.0    BUN/Creatinine Ratio 13.8  14.7    Anion Gap 10.3  11.3      CBC w/diff          3/13/2023    14:26 11/15/2023    13:33   CBC w/Diff   WBC 9.87  8.13    RBC 4.89  4.92    Hemoglobin 13.8  13.8    Hematocrit 44.4  43.8    MCV 90.8  89.0    MCH 28.2  28.0    MCHC 31.1  31.5    RDW 14.3  14.4    Platelets 233  214    Neutrophil Rel % 68.3  59.2    Immature Granulocyte Rel % 0.4  0.4    Lymphocyte Rel % 22.9  29.5    Monocyte Rel % 6.5  8.0    Eosinophil Rel % 1.5  2.5    Basophil Rel % 0.4  0.4      Lipid Panel          3/13/2023    14:26 11/15/2023    13:33   Lipid Panel   Total Cholesterol 126  137    Triglycerides 87  82    HDL Cholesterol 51  53    VLDL Cholesterol 17  16    LDL Cholesterol  58  68    LDL/HDL Ratio 1.13  1.28      TSH          3/13/2023    14:26 11/15/2023    13:33   TSH   TSH 1.230  1.060        Assessment and Plan   Diagnoses and all orders for this visit:    1. Essential hypertension (Primary)  Assessment & Plan:  Hypertension is unchanged.  Continue current treatment regimen.  Blood pressure will be reassessed at the next regular appointment.    Orders:  -     CBC w AUTO Differential  -     Comprehensive metabolic panel  -     Lipid panel  -     TSH    2. Gastroesophageal reflux disease, unspecified whether esophagitis present  -     esomeprazole (nexIUM) 40 MG capsule; Take 1 capsule by mouth Every Morning Before Breakfast.  Dispense: 90 capsule; Refill: 1    3. Vertigo  -     Ambulatory Referral to ENT (Otolaryngology)    4. Encounter for immunization  -     Fluzone High-Dose 65+yrs             Follow Up   Return in about 7 months (around 6/15/2024) for Medicare Wellness.  Patient was given instructions and counseling regarding his condition or for health maintenance advice. Please see specific information pulled  into the AVS if appropriate.       This document has been electronically signed by MARTHA Badillo  November 16, 2023 06:22 EST

## 2023-11-17 ENCOUNTER — TELEPHONE (OUTPATIENT)
Dept: FAMILY MEDICINE CLINIC | Facility: CLINIC | Age: 78
End: 2023-11-17
Payer: MEDICARE

## 2023-11-17 NOTE — TELEPHONE ENCOUNTER
"Relay     \"I was calling to inform you that we have you scheduled with Doc MAY on 11/30/2023 at 3:00pm with Dalia Knutson.\"                "

## 2024-02-20 ENCOUNTER — OFFICE VISIT (OUTPATIENT)
Dept: CARDIOLOGY | Facility: CLINIC | Age: 79
End: 2024-02-20
Payer: MEDICARE

## 2024-02-20 VITALS
DIASTOLIC BLOOD PRESSURE: 70 MMHG | WEIGHT: 218.2 LBS | HEART RATE: 68 BPM | SYSTOLIC BLOOD PRESSURE: 136 MMHG | HEIGHT: 70 IN | BODY MASS INDEX: 31.24 KG/M2

## 2024-02-20 DIAGNOSIS — E66.9 OBESITY (BMI 30.0-34.9): ICD-10-CM

## 2024-02-20 DIAGNOSIS — I10 ESSENTIAL HYPERTENSION: ICD-10-CM

## 2024-02-20 DIAGNOSIS — R07.89 CHEST DISCOMFORT: ICD-10-CM

## 2024-02-20 DIAGNOSIS — I25.9 IHD (ISCHEMIC HEART DISEASE): ICD-10-CM

## 2024-02-20 DIAGNOSIS — E87.5 HYPERKALEMIA: ICD-10-CM

## 2024-02-20 DIAGNOSIS — E78.00 HYPERCHOLESTEREMIA: ICD-10-CM

## 2024-02-20 DIAGNOSIS — E03.9 ACQUIRED HYPOTHYROIDISM: ICD-10-CM

## 2024-02-20 DIAGNOSIS — R42 DIZZINESS: Primary | ICD-10-CM

## 2024-02-20 PROCEDURE — 3078F DIAST BP <80 MM HG: CPT | Performed by: NURSE PRACTITIONER

## 2024-02-20 PROCEDURE — 99214 OFFICE O/P EST MOD 30 MIN: CPT | Performed by: NURSE PRACTITIONER

## 2024-02-20 PROCEDURE — 3075F SYST BP GE 130 - 139MM HG: CPT | Performed by: NURSE PRACTITIONER

## 2024-02-20 RX ORDER — EZETIMIBE 10 MG/1
10 TABLET ORAL DAILY
Qty: 90 TABLET | Refills: 3 | Status: SHIPPED | OUTPATIENT
Start: 2024-02-20

## 2024-02-20 RX ORDER — NITROGLYCERIN 0.4 MG/1
TABLET SUBLINGUAL
Qty: 25 TABLET | Refills: 1 | Status: SHIPPED | OUTPATIENT
Start: 2024-02-20

## 2024-02-20 RX ORDER — ROSUVASTATIN CALCIUM 10 MG/1
10 TABLET, COATED ORAL DAILY
Qty: 90 TABLET | Refills: 3 | Status: SHIPPED | OUTPATIENT
Start: 2024-02-20

## 2024-02-20 RX ORDER — AMLODIPINE BESYLATE 10 MG/1
10 TABLET ORAL DAILY
Qty: 90 TABLET | Refills: 3 | Status: SHIPPED | OUTPATIENT
Start: 2024-02-20

## 2024-02-20 RX ORDER — METOPROLOL TARTRATE 50 MG/1
50 TABLET, FILM COATED ORAL 2 TIMES DAILY
Qty: 180 TABLET | Refills: 3 | Status: SHIPPED | OUTPATIENT
Start: 2024-02-20

## 2024-02-20 NOTE — PROGRESS NOTES
Chief Complaint   Patient presents with    Follow-up     Pt is here for cardiac follow up.  He states his dizziness has no changes since LOV.  He states he has very rare, mild CP.  He denies SOB or palpitations.  Pt does take a daily ASA.      Med Refill     Pt request 90 day refills to be sent to [facility].  Medications were verified by med list.      Lab Wrok     Pt states their last labs were 11/15/23 with his PCP.         Cardiac Complaints  Dizziness      Subjective   Steve Santoyo is a 78 y.o. 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  In 2003 found to have worsening of disease involving the left main and the LAD and underwent two-vessel bypass surgery.  In 2014 found that the vein graft to the obtuse marginal was occluded and RCA had new lesion for which he underwent stenting.  Since then he has undergone stress test twice once in 2016 other one is 2019 and both of them showed some changes in the inferior wall.  He did not have any more anginal symptoms have been managing him medically.  In 2022, more chest pain and SOA noted, stress and echo advised. Stress showed EF 61% with inferolateral infarct and mild michela infarct ischemia, norvasc dosing increased for HTN response.     He comes today for follow up and continues to have dizziness, unchanged for many years. He still experiences chest pain at random, which he describes as mild. SOA, palpitations, and syncope denied. Labs with PCP, checked in November and showed: K 5.5, Creatinine 1.42, BUN 21, ALT 19, AST 20, GFR 49.8, LDL 68, HDL 53, TRIG 82, TSH 1.060, HH 13.8/43.8, referral to renal made by PCP. Refills requested.          Cardiac History  Past Surgical History:   Procedure Laterality Date    CARDIAC CATHETERIZATION  08/13/2001    100% LCX    CARDIAC CATHETERIZATION  04/24/2002    100% LCX    CARDIAC CATHETERIZATION  12/16/2003    50% LM, 70% LAD, 100% LCX     CARDIAC CATHETERIZATION  06/24/2014    patent LIMA-LAD. 100% SVG-OM, 90% RCA-3.0x26 resolute    CARDIOVASCULAR STRESS TEST  09/20/2007    7 min, 70% THR, negative    CARDIOVASCULAR STRESS TEST  12/01/2008    7 min, 30 sec., 68% THR. R/O lateral ischemia    CARDIOVASCULAR STRESS TEST  04/12/2010    S. Echo- 7min, 30sec, 79% THR. Negative    CARDIOVASCULAR STRESS TEST  04/13/2012    L. Myoview- small apical ischemia    CARDIOVASCULAR STRESS TEST  02/10/2016    L. Myoview- Inferolateral Ischemia. EF > 65%    CARDIOVASCULAR STRESS TEST  03/13/2019    L.Cardiolite- EF 66%. Inferolateral Ischemia.    CARDIOVASCULAR STRESS TEST  07/06/2022    Lexiscan- EF 61%. BP- 178/87. Inferolateral Infarct with Krissy Infarct Ischemia    CORONARY ARTERY BYPASS GRAFT  12/18/2003    MCNEAL-LAD, SVG-OM.    ECHO - CONVERTED  09/20/2007    EF >60%, Mild to mod MR.    ECHO - CONVERTED  12/01/2008    EF>60%, Lateral WMA. RVSP- 42mmHg    ECHO - CONVERTED  04/13/2012    EF 60-65%, aortic root 4.1cm    ECHO - CONVERTED  07/01/2015    EF 65%    ECHO - CONVERTED  02/10/2016    EF 60%    ECHO - CONVERTED  03/13/2019    EF 65%. Mild MR. LA- 3.9 Cm    ECHO - CONVERTED  07/06/2022    TLS. EF 65%. LA- 4.4. Mild MR. RVSP- 24 mmHg    OTHER SURGICAL HISTORY  04/14/2011    CT head- negative    OTHER SURGICAL HISTORY  03/06/2017    BISHNU- no hemodynamically significant stenosis noted either side    US CAROTID UNILATERAL  04/14/2011    no sig stenosis       Current Outpatient Medications   Medication Sig Dispense Refill    amLODIPine (NORVASC) 10 MG tablet Take 1 tablet by mouth Daily. 90 tablet 3    aspirin 81 MG EC tablet Take 1 tablet by mouth Daily. 30 tablet 3    esomeprazole (nexIUM) 40 MG capsule Take 1 capsule by mouth Every Morning Before Breakfast. 90 capsule 1    ezetimibe (ZETIA) 10 MG tablet Take 1 tablet by mouth Daily. 90 tablet 3    levothyroxine (SYNTHROID, LEVOTHROID) 100 MCG tablet TAKE 1 TABLET BY MOUTH EVERY DAY ON EMPTY STOMACH IN THE MORNING  ORALLY ONCE A DAY 90 DAYS 90 tablet 3    metoprolol tartrate (LOPRESSOR) 50 MG tablet Take 1 tablet by mouth 2 (Two) Times a Day. 180 tablet 3    nitroglycerin (NITROSTAT) 0.4 MG SL tablet 1 under the tongue as needed for angina, may repeat q5mins for up three doses 25 tablet 1    rosuvastatin (CRESTOR) 10 MG tablet Take 1 tablet by mouth Daily. 90 tablet 3    sildenafil (VIAGRA) 50 MG tablet Take 1 tablet by mouth Daily As Needed for Erectile Dysfunction. 90 tablet 1     No current facility-administered medications for this visit.       Patient has no known allergies.    Past Medical History:   Diagnosis Date    Arthritis     GERD (gastroesophageal reflux disease)     Hypercholesterolemia     Hyperlipidemia     Hypertension     Hypothyroidism     IHD (ischemic heart disease)     PUD (peptic ulcer disease)        Social History     Socioeconomic History    Marital status:    Tobacco Use    Smoking status: Former     Packs/day: 1.00     Years: 15.00     Additional pack years: 0.00     Total pack years: 15.00     Types: Cigarettes     Quit date: 1986     Years since quittin.1    Smokeless tobacco: Never   Vaping Use    Vaping Use: Never used   Substance and Sexual Activity    Alcohol use: No    Drug use: No       Family History   Problem Relation Age of Onset    Heart attack Mother     Heart disease Mother         stent placement     Heart attack Father         multiple    Lung disease Father     Heart attack Brother     Heart disease Brother         CABG at age 42       Review of Systems   Constitutional: Negative for malaise/fatigue and night sweats.   Cardiovascular:  Positive for chest pain. Negative for claudication, dyspnea on exertion, irregular heartbeat, leg swelling, near-syncope, orthopnea, palpitations and syncope.   Musculoskeletal:  Negative for back pain, joint pain and stiffness.   Gastrointestinal:  Negative for anorexia, heartburn, nausea and vomiting.   Genitourinary:  Negative for  "dysuria, hematuria, hesitancy and nocturia.   Neurological:  Positive for dizziness and light-headedness.   Psychiatric/Behavioral:  Negative for depression and memory loss. The patient is not nervous/anxious.            Objective     /70 (BP Location: Left arm, Patient Position: Sitting)   Pulse 68   Ht 177.8 cm (70\")   Wt 99 kg (218 lb 3.2 oz)   BMI 31.31 kg/m²     Constitutional:       Appearance: Not in distress.   Eyes:      Pupils: Pupils are equal, round, and reactive to light.   HENT:      Nose: Nose normal.   Pulmonary:      Effort: Pulmonary effort is normal.      Breath sounds: Normal breath sounds.   Cardiovascular:      PMI at left midclavicular line. Normal rate. Regular rhythm.      Murmurs: There is a systolic murmur.   Abdominal:      Palpations: Abdomen is soft.   Musculoskeletal: Normal range of motion.      Cervical back: Normal range of motion and neck supple. Skin:     General: Skin is warm and dry.   Neurological:      Mental Status: Alert.         Procedures         Diagnoses and all orders for this visit:    1. Dizziness (Primary)  -     US Carotid Bilateral; Future    2. IHD (ischemic heart disease)    3. Essential hypertension  -     amLODIPine (NORVASC) 10 MG tablet; Take 1 tablet by mouth Daily.  Dispense: 90 tablet; Refill: 3    4. Hypercholesteremia  -     ezetimibe (ZETIA) 10 MG tablet; Take 1 tablet by mouth Daily.  Dispense: 90 tablet; Refill: 3  -     rosuvastatin (CRESTOR) 10 MG tablet; Take 1 tablet by mouth Daily.  Dispense: 90 tablet; Refill: 3    5. Chest discomfort    6. Hyperkalemia  -     Comprehensive Metabolic Panel; Future    7. Acquired hypothyroidism    8. Obesity (BMI 30.0-34.9)    Other orders  -     metoprolol tartrate (LOPRESSOR) 50 MG tablet; Take 1 tablet by mouth 2 (Two) Times a Day.  Dispense: 180 tablet; Refill: 3  -     nitroglycerin (NITROSTAT) 0.4 MG SL tablet; 1 under the tongue as needed for angina, may repeat q5mins for up three doses  " Dispense: 25 tablet; Refill: 1             IHD: CP noted rarely, nothing he feels is concerning, very mild in nature. Patient states pain does not interfere with daily life, nor is does it have associated symptoms. Denied use of NTG SL. Will call if pain should worsen, for possible cath vs testing. Declines at that time.     HTN:  Blood pressure stable. Will continue norvasc and lopressor at same. Limited sodium urged.     Hyperlipidemia:  On statin therapy with crestor and zetia therapy without concerns. FLP checked with you in November, lipids at goal.      Hypothyroid:  On synthroid therapy without concerns, HR at goal.  TSH showed hypothyroidism at goal.      BMI noted at 31.31, good cardiac diet with limited carbs, calories, and activity as tolerated advised. He has 10 pound weight gain, encouraged on limiting starch.     Refills per request     6 month follow up recommended or sooner if needed         Problems Addressed this Visit          Cardiac and Vasculature    IHD (ischemic heart disease)    Relevant Medications    amLODIPine (NORVASC) 10 MG tablet    metoprolol tartrate (LOPRESSOR) 50 MG tablet    nitroglycerin (NITROSTAT) 0.4 MG SL tablet    Essential hypertension    Relevant Medications    amLODIPine (NORVASC) 10 MG tablet    metoprolol tartrate (LOPRESSOR) 50 MG tablet    Hypercholesteremia    Relevant Medications    ezetimibe (ZETIA) 10 MG tablet    rosuvastatin (CRESTOR) 10 MG tablet       Endocrine and Metabolic    Hypothyroidism    Relevant Medications    metoprolol tartrate (LOPRESSOR) 50 MG tablet     Other Visit Diagnoses       Dizziness    -  Primary    Relevant Orders    US Carotid Bilateral    Chest discomfort        Hyperkalemia        Relevant Orders    Comprehensive Metabolic Panel    Obesity (BMI 30.0-34.9)              Diagnoses         Codes Comments    Dizziness    -  Primary ICD-10-CM: R42  ICD-9-CM: 780.4     IHD (ischemic heart disease)     ICD-10-CM: I25.9  ICD-9-CM: 414.9      Essential hypertension     ICD-10-CM: I10  ICD-9-CM: 401.9     Hypercholesteremia     ICD-10-CM: E78.00  ICD-9-CM: 272.0     Chest discomfort     ICD-10-CM: R07.89  ICD-9-CM: 786.59     Hyperkalemia     ICD-10-CM: E87.5  ICD-9-CM: 276.7     Acquired hypothyroidism     ICD-10-CM: E03.9  ICD-9-CM: 244.9     Obesity (BMI 30.0-34.9)     ICD-10-CM: E66.9  ICD-9-CM: 278.00                      Electronically signed by Sylwia Syed, APRN February 20, 2024 10:55 EST

## 2024-02-22 ENCOUNTER — LAB (OUTPATIENT)
Dept: LAB | Facility: HOSPITAL | Age: 79
End: 2024-02-22
Payer: MEDICARE

## 2024-02-22 ENCOUNTER — HOSPITAL ENCOUNTER (OUTPATIENT)
Dept: CARDIOLOGY | Facility: HOSPITAL | Age: 79
Discharge: HOME | End: 2024-02-22
Payer: MEDICARE

## 2024-02-22 DIAGNOSIS — R42 DIZZINESS: ICD-10-CM

## 2024-02-22 DIAGNOSIS — E87.5 HYPERKALEMIA: ICD-10-CM

## 2024-02-22 LAB
ALBUMIN SERPL-MCNC: 4.7 G/DL (ref 3.5–5.2)
ALBUMIN/GLOB SERPL: 1.7 G/DL
ALP SERPL-CCNC: 84 U/L (ref 39–117)
ALT SERPL W P-5'-P-CCNC: 22 U/L (ref 1–41)
ANION GAP SERPL CALCULATED.3IONS-SCNC: 11.9 MMOL/L (ref 5–15)
AST SERPL-CCNC: 21 U/L (ref 1–40)
BILIRUB SERPL-MCNC: 0.4 MG/DL (ref 0–1.2)
BUN SERPL-MCNC: 23 MG/DL (ref 8–23)
BUN/CREAT SERPL: 13.9 (ref 7–25)
CALCIUM SPEC-SCNC: 9.6 MG/DL (ref 8.6–10.5)
CHLORIDE SERPL-SCNC: 100 MMOL/L (ref 98–107)
CO2 SERPL-SCNC: 21.1 MMOL/L (ref 22–29)
CREAT SERPL-MCNC: 1.66 MG/DL (ref 0.76–1.27)
EGFRCR SERPLBLD CKD-EPI 2021: 41.7 ML/MIN/1.73
GLOBULIN UR ELPH-MCNC: 2.8 GM/DL
GLUCOSE SERPL-MCNC: 104 MG/DL (ref 65–99)
POTASSIUM SERPL-SCNC: 5 MMOL/L (ref 3.5–5.2)
PROT SERPL-MCNC: 7.5 G/DL (ref 6–8.5)
SODIUM SERPL-SCNC: 133 MMOL/L (ref 136–145)

## 2024-02-22 PROCEDURE — 80053 COMPREHEN METABOLIC PANEL: CPT

## 2024-02-22 PROCEDURE — 93880 EXTRACRANIAL BILAT STUDY: CPT

## 2024-02-22 PROCEDURE — 93880 EXTRACRANIAL BILAT STUDY: CPT | Performed by: RADIOLOGY

## 2024-02-22 PROCEDURE — 36415 COLL VENOUS BLD VENIPUNCTURE: CPT

## 2024-02-23 ENCOUNTER — TELEPHONE (OUTPATIENT)
Dept: CARDIOLOGY | Facility: CLINIC | Age: 79
End: 2024-02-23
Payer: MEDICARE

## 2024-02-23 DIAGNOSIS — N18.31 STAGE 3A CHRONIC KIDNEY DISEASE: Primary | ICD-10-CM

## 2024-02-23 NOTE — PROGRESS NOTES
He has been referred to renal, he said he does not know if he wants to go, I would recommend he does so, renal function slightly worse.

## 2024-02-26 ENCOUNTER — OFFICE VISIT (OUTPATIENT)
Dept: FAMILY MEDICINE CLINIC | Facility: CLINIC | Age: 79
End: 2024-02-26
Payer: MEDICARE

## 2024-02-26 ENCOUNTER — TELEPHONE (OUTPATIENT)
Dept: FAMILY MEDICINE CLINIC | Facility: CLINIC | Age: 79
End: 2024-02-26

## 2024-02-26 VITALS
WEIGHT: 219 LBS | OXYGEN SATURATION: 99 % | HEART RATE: 86 BPM | HEIGHT: 70 IN | RESPIRATION RATE: 18 BRPM | DIASTOLIC BLOOD PRESSURE: 64 MMHG | BODY MASS INDEX: 31.35 KG/M2 | SYSTOLIC BLOOD PRESSURE: 126 MMHG

## 2024-02-26 DIAGNOSIS — M54.41 ACUTE BILATERAL LOW BACK PAIN WITH RIGHT-SIDED SCIATICA: Primary | ICD-10-CM

## 2024-02-26 PROCEDURE — 1160F RVW MEDS BY RX/DR IN RCRD: CPT | Performed by: NURSE PRACTITIONER

## 2024-02-26 PROCEDURE — 99213 OFFICE O/P EST LOW 20 MIN: CPT | Performed by: NURSE PRACTITIONER

## 2024-02-26 PROCEDURE — 3078F DIAST BP <80 MM HG: CPT | Performed by: NURSE PRACTITIONER

## 2024-02-26 PROCEDURE — 3074F SYST BP LT 130 MM HG: CPT | Performed by: NURSE PRACTITIONER

## 2024-02-26 PROCEDURE — 1159F MED LIST DOCD IN RCRD: CPT | Performed by: NURSE PRACTITIONER

## 2024-02-26 NOTE — PROGRESS NOTES
"Chief Complaint  Back Pain (Started a couple months ago worsening daily./)    Subjective        Steve Santoyo presents to [facility] for acute care (back pain).    Back Pain  This is a recurrent problem. Episode onset: \"2 months or so\" The problem occurs daily. The problem has been worse since onset. The pain is present in the lumbar spine. The quality of the pain is described as aching, burning, cramping, shooting and stabbing. The pain radiates to the right anterolateral lower leg. The pain is at a severity of 5/10. The pain is Worse during the day. The symptoms are aggravated by bending, position and standing. Associated symptoms include leg pain, numbness, tingling and weakness. Pertinent negatives include no abdominal pain, bladder incontinence, bowel incontinence, chest pain, dysuria, fever, headaches, paresis, paresthesias, pelvic pain, perianal numbness or weight loss. Risk factors include obesity. He has tried NSAIDs for the symptoms. The treatment provided mild relief.     Objective   Vital Signs:  /64   Pulse 86   Resp 18   Ht 177.8 cm (70\")   Wt 99.3 kg (219 lb)   SpO2 99%   BMI 31.42 kg/m²   Estimated body mass index is 31.42 kg/m² as calculated from the following:    Height as of this encounter: 177.8 cm (70\").    Weight as of this encounter: 99.3 kg (219 lb).               Physical Exam  Vitals and nursing note reviewed.   Constitutional:       General: He is awake.      Appearance: Normal appearance.   HENT:      Head: Normocephalic.      Right Ear: External ear normal. Decreased hearing noted.      Left Ear: External ear normal. Decreased hearing noted.      Nose: Nose normal.      Mouth/Throat:      Lips: Pink.      Mouth: Mucous membranes are moist.   Eyes:      General: Lids are normal.      Conjunctiva/sclera: Conjunctivae normal.      Pupils: Pupils are equal, round, and reactive to light.   Cardiovascular:      Rate and Rhythm: Normal rate. "   Pulmonary:      Effort: Pulmonary effort is normal.   Abdominal:      General: Abdomen is protuberant.   Musculoskeletal:         General: Normal range of motion.      Cervical back: Normal range of motion.      Lumbar back: Spasms and tenderness present. Decreased range of motion.   Skin:     General: Skin is warm and dry.      Capillary Refill: Capillary refill takes less than 2 seconds.   Neurological:      Mental Status: He is alert and oriented to person, place, and time.      Sensory: Sensation is intact.      Motor: Motor function is intact.      Coordination: Coordination is intact.      Gait: Gait is intact.   Psychiatric:         Attention and Perception: Attention and perception normal.         Mood and Affect: Affect normal. Mood is anxious.         Speech: Speech is rapid and pressured.         Behavior: Behavior normal. Behavior is cooperative.         Thought Content: Thought content normal.        Result Review :    The following data was reviewed by: MARTHA Badillo on 02/26/2024:    Data reviewed : Radiologic studies 2/26/24 - x-ray lumbar spine    Assessment and Plan     Diagnoses and all orders for this visit:    1. Acute bilateral low back pain with right-sided sciatica (Primary)  Comments:  pt requests neurosurgery referral after MRI complete for further eval/treatment  Orders:  -     XR Spine Lumbar 2 or 3 View (In Office)  -     MRI Lumbar Spine Without Contrast; Future         I spent 20 minutes caring for Steve on this date of service. This time includes time spent by me in the following activities:preparing for the visit, reviewing tests, obtaining and/or reviewing a separately obtained history, performing a medically appropriate examination and/or evaluation , counseling and educating the patient/family/caregiver, ordering medications, tests, or procedures, referring and communicating with other health care professionals , documenting information in the medical record, independently  interpreting results and communicating that information with the patient/family/caregiver, and care coordination    Follow Up     Return if symptoms worsen or fail to improve.  Patient was given instructions and counseling regarding his condition or for health maintenance advice. Please see specific information pulled into the AVS if appropriate.       This document has been electronically signed by MARTHA Badillo  February 27, 2024 05:58 EST

## 2024-02-26 NOTE — TELEPHONE ENCOUNTER
Caller: Steve Santoyo    Relationship: Self    Best call back number: [phone]     What medication are you requesting: MUSCLE RELAXER    What are your current symptoms: BACK PAIN    How long have you been experiencing symptoms: 2 MONTHS, HAD TO GET OUT OF BED.    Have you had these symptoms before:    [x] Yes  [] No    Have you been treated for these symptoms before:   [] Yes  [x] No    If a prescription is needed, what is your preferred pharmacy and phone number: [id number] - [place] - [address] - [phone] [facility] [phone] FX

## 2024-03-19 DIAGNOSIS — N52.9 ERECTILE DYSFUNCTION, UNSPECIFIED ERECTILE DYSFUNCTION TYPE: ICD-10-CM

## 2024-03-19 RX ORDER — LEVOTHYROXINE SODIUM 0.1 MG/1
TABLET ORAL
Qty: 90 TABLET | Refills: 1 | OUTPATIENT
Start: 2024-03-19

## 2024-03-19 RX ORDER — SILDENAFIL 50 MG/1
50 TABLET, FILM COATED ORAL DAILY PRN
Qty: 10 TABLET | Refills: 0 | Status: SHIPPED | OUTPATIENT
Start: 2024-03-19

## 2024-04-24 RX ORDER — LEVOTHYROXINE SODIUM 0.1 MG/1
TABLET ORAL
Qty: 90 TABLET | Refills: 3 | OUTPATIENT
Start: 2024-04-24

## 2024-04-24 NOTE — TELEPHONE ENCOUNTER
PATIENT CALLING TO CHECK ON THE STATUS OF THIS. STATES HE IS BETWEEN PCP'S RIGHT NOW BECAUSE HIS PREVIOUS ONE LEFT AND WOULD LIKE TO KNOW IF RX CAN BE SENT IN TO HOLD HIM OVER UNTIL HIS APPOINTMENT WITH THEM.

## 2024-05-06 RX ORDER — LEVOTHYROXINE SODIUM 0.1 MG/1
TABLET ORAL
Qty: 90 TABLET | Refills: 3 | Status: CANCELLED | OUTPATIENT
Start: 2024-05-06

## 2024-05-06 RX ORDER — LEVOTHYROXINE SODIUM 0.1 MG/1
100 TABLET ORAL DAILY
Qty: 90 TABLET | Refills: 0 | Status: SHIPPED | OUTPATIENT
Start: 2024-05-06 | End: 2024-08-04

## 2024-05-06 NOTE — TELEPHONE ENCOUNTER
Rx Refill Note  Requested Prescriptions     Pending Prescriptions Disp Refills    levothyroxine (SYNTHROID, LEVOTHROID) 100 MCG tablet 90 tablet 3      Last office visit with prescribing clinician: Visit date not found   Last telemedicine visit with prescribing clinician: Visit date not found   Next office visit with prescribing clinician: Visit date not found                         Would you like a call back once the refill request has been completed: [] Yes [] No    If the office needs to give you a call back, can they leave a voicemail: [] Yes [] No    Estefania Palma MA  05/06/24, 12:05 EDT

## 2024-06-10 ENCOUNTER — TELEPHONE (OUTPATIENT)
Dept: FAMILY MEDICINE CLINIC | Facility: CLINIC | Age: 79
End: 2024-06-10

## 2024-06-10 NOTE — TELEPHONE ENCOUNTER
"Relay     \"Hello, we were trying to reach you to get your appointment that was on 6/10/24 rescheduled to your earliest convenience. \"                "

## 2024-07-10 ENCOUNTER — OFFICE VISIT (OUTPATIENT)
Dept: FAMILY MEDICINE CLINIC | Facility: CLINIC | Age: 79
End: 2024-07-10
Payer: MEDICARE

## 2024-07-10 VITALS
HEIGHT: 70 IN | SYSTOLIC BLOOD PRESSURE: 138 MMHG | HEART RATE: 78 BPM | BODY MASS INDEX: 31.5 KG/M2 | RESPIRATION RATE: 18 BRPM | OXYGEN SATURATION: 98 % | WEIGHT: 220 LBS | DIASTOLIC BLOOD PRESSURE: 75 MMHG | TEMPERATURE: 97 F

## 2024-07-10 DIAGNOSIS — E03.9 HYPOTHYROIDISM, UNSPECIFIED TYPE: ICD-10-CM

## 2024-07-10 DIAGNOSIS — I10 ESSENTIAL HYPERTENSION: ICD-10-CM

## 2024-07-10 DIAGNOSIS — E78.00 HYPERCHOLESTEREMIA: ICD-10-CM

## 2024-07-10 DIAGNOSIS — B35.9 TINEA: ICD-10-CM

## 2024-07-10 DIAGNOSIS — I25.9 IHD (ISCHEMIC HEART DISEASE): ICD-10-CM

## 2024-07-10 DIAGNOSIS — Z00.00 MEDICARE ANNUAL WELLNESS VISIT, SUBSEQUENT: Primary | ICD-10-CM

## 2024-07-10 PROBLEM — N18.31 STAGE 3A CHRONIC KIDNEY DISEASE: Status: ACTIVE | Noted: 2024-07-10

## 2024-07-10 PROCEDURE — 1160F RVW MEDS BY RX/DR IN RCRD: CPT | Performed by: FAMILY MEDICINE

## 2024-07-10 PROCEDURE — 3078F DIAST BP <80 MM HG: CPT | Performed by: FAMILY MEDICINE

## 2024-07-10 PROCEDURE — 1159F MED LIST DOCD IN RCRD: CPT | Performed by: FAMILY MEDICINE

## 2024-07-10 PROCEDURE — 1170F FXNL STATUS ASSESSED: CPT | Performed by: FAMILY MEDICINE

## 2024-07-10 PROCEDURE — G0439 PPPS, SUBSEQ VISIT: HCPCS | Performed by: FAMILY MEDICINE

## 2024-07-10 PROCEDURE — 3075F SYST BP GE 130 - 139MM HG: CPT | Performed by: FAMILY MEDICINE

## 2024-07-10 RX ORDER — LEVOTHYROXINE SODIUM 0.1 MG/1
100 TABLET ORAL DAILY
Qty: 90 TABLET | Refills: 1 | Status: SHIPPED | OUTPATIENT
Start: 2024-07-10 | End: 2024-10-08

## 2024-07-10 RX ORDER — CLOTRIMAZOLE AND BETAMETHASONE DIPROPIONATE 10; .64 MG/G; MG/G
1 CREAM TOPICAL 2 TIMES DAILY
Qty: 45 G | Refills: 0 | Status: SHIPPED | OUTPATIENT
Start: 2024-07-10 | End: 2024-08-02

## 2024-07-10 NOTE — ASSESSMENT & PLAN NOTE
Coronary Artery Disease (OPTIONAL): Coronary artery disease is stable.  Continue current treatment regimen. Dietary sodium restriction. Weight loss. Follows with Cardiology.  Cardiac status will be reassessed in 6 months.

## 2024-07-10 NOTE — PATIENT INSTRUCTIONS
Health Maintenance, Male  Adopting a healthy lifestyle and getting preventive care are important in promoting health and wellness. Ask your health care provider about:  The right schedule for you to have regular tests and exams.  Things you can do on your own to prevent diseases and keep yourself healthy.  What should I know about diet, weight, and exercise?  Eat a healthy diet    Eat a diet that includes plenty of vegetables, fruits, low-fat dairy products, and lean protein.  Do not eat a lot of foods that are high in solid fats, added sugars, or sodium.  Maintain a healthy weight  Body mass index (BMI) is a measurement that can be used to identify possible weight problems. It estimates body fat based on height and weight. Your health care provider can help determine your BMI and help you achieve or maintain a healthy weight.  Get regular exercise  Get regular exercise. This is one of the most important things you can do for your health. Most adults should:  Exercise for at least 150 minutes each week. The exercise should increase your heart rate and make you sweat (moderate-intensity exercise).  Do strengthening exercises at least twice a week. This is in addition to the moderate-intensity exercise.  Spend less time sitting. Even light physical activity can be beneficial.  Watch cholesterol and blood lipids  Have your blood tested for lipids and cholesterol at 20 years of age, then have this test every 5 years.  You may need to have your cholesterol levels checked more often if:  Your lipid or cholesterol levels are high.  You are older than 40 years of age.  You are at high risk for heart disease.  What should I know about cancer screening?  Many types of cancers can be detected early and may often be prevented. Depending on your health history and family history, you may need to have cancer screening at various ages. This may include screening for:  Colorectal cancer.  Prostate cancer.  Skin cancer.  Lung  cancer.  What should I know about heart disease, diabetes, and high blood pressure?  Blood pressure and heart disease  High blood pressure causes heart disease and increases the risk of stroke. This is more likely to develop in people who have high blood pressure readings or are overweight.  Talk with your health care provider about your target blood pressure readings.  Have your blood pressure checked:  Every 3-5 years if you are 18-39 years of age.  Every year if you are 40 years old or older.  If you are between the ages of 65 and 75 and are a current or former smoker, ask your health care provider if you should have a one-time screening for abdominal aortic aneurysm (AAA).  Diabetes  Have regular diabetes screenings. This checks your fasting blood sugar level. Have the screening done:  Once every three years after age 45 if you are at a normal weight and have a low risk for diabetes.  More often and at a younger age if you are overweight or have a high risk for diabetes.  What should I know about preventing infection?  Hepatitis B  If you have a higher risk for hepatitis B, you should be screened for this virus. Talk with your health care provider to find out if you are at risk for hepatitis B infection.  Hepatitis C  Blood testing is recommended for:  Everyone born from 1945 through 1965.  Anyone with known risk factors for hepatitis C.  Sexually transmitted infections (STIs)  You should be screened each year for STIs, including gonorrhea and chlamydia, if:  You are sexually active and are younger than 24 years of age.  You are older than 24 years of age and your health care provider tells you that you are at risk for this type of infection.  Your sexual activity has changed since you were last screened, and you are at increased risk for chlamydia or gonorrhea. Ask your health care provider if you are at risk.  Ask your health care provider about whether you are at high risk for HIV. Your health care provider  may recommend a prescription medicine to help prevent HIV infection. If you choose to take medicine to prevent HIV, you should first get tested for HIV. You should then be tested every 3 months for as long as you are taking the medicine.  Follow these instructions at home:  Alcohol use  Do not drink alcohol if your health care provider tells you not to drink.  If you drink alcohol:  Limit how much you have to 0-2 drinks a day.  Know how much alcohol is in your drink. In the [place] one drink equals one 12 oz bottle of beer (355 mL), one 5 oz glass of wine (148 mL), or one 1½ oz glass of hard liquor (44 mL).  Lifestyle  Do not use any products that contain nicotine or tobacco. These products include cigarettes, chewing tobacco, and vaping devices, such as e-cigarettes. If you need help quitting, ask your health care provider.  Do not use street drugs.  Do not share needles.  Ask your health care provider for help if you need support or information about quitting drugs.  General instructions  Schedule regular health, dental, and eye exams.  Stay current with your vaccines.  Tell your health care provider if:  You often feel depressed.  You have ever been abused or do not feel safe at home.  Summary  Adopting a healthy lifestyle and getting preventive care are important in promoting health and wellness.  Follow your health care provider's instructions about healthy diet, exercising, and getting tested or screened for diseases.  Follow your health care provider's instructions on monitoring your cholesterol and blood pressure.  This information is not intended to replace advice given to you by your health care provider. Make sure you discuss any questions you have with your health care provider.  Document Revised: 05/09/2022 Document Reviewed: 05/09/2022  [employer]vier Patient Education © 2024 [employer]  Hypertension, Adult  High blood pressure (hypertension) is when the force of blood pumping through the arteries is too  "strong. The arteries are the blood vessels that carry blood from the heart throughout the body. Hypertension forces the heart to work harder to pump blood and may cause arteries to become narrow or stiff. Untreated or uncontrolled hypertension can lead to a heart attack, heart failure, a stroke, kidney disease, and other problems.  A blood pressure reading consists of a higher number over a lower number. Ideally, your blood pressure should be below 120/80. The first (\"top\") number is called the systolic pressure. It is a measure of the pressure in your arteries as your heart beats. The second (\"bottom\") number is called the diastolic pressure. It is a measure of the pressure in your arteries as the heart relaxes.  What are the causes?  The exact cause of this condition is not known. There are some conditions that result in high blood pressure.  What increases the risk?  Certain factors may make you more likely to develop high blood pressure. Some of these risk factors are under your control, including:  Smoking.  Not getting enough exercise or physical activity.  Being overweight.  Having too much fat, sugar, calories, or salt (sodium) in your diet.  Drinking too much alcohol.  Other risk factors include:  Having a personal history of heart disease, diabetes, high cholesterol, or kidney disease.  Stress.  Having a family history of high blood pressure and high cholesterol.  Having obstructive sleep apnea.  Age. The risk increases with age.  What are the signs or symptoms?  High blood pressure may not cause symptoms. Very high blood pressure (hypertensive crisis) may cause:  Headache.  Fast or irregular heartbeats (palpitations).  Shortness of breath.  Nosebleed.  Nausea and vomiting.  Vision changes.  Severe chest pain, dizziness, and seizures.  How is this diagnosed?  This condition is diagnosed by measuring your blood pressure while you are seated, with your arm resting on a flat surface, your legs uncrossed, and " your feet flat on the floor. The cuff of the blood pressure monitor will be placed directly against the skin of your upper arm at the level of your heart. Blood pressure should be measured at least twice using the same arm. Certain conditions can cause a difference in blood pressure between your right and left arms.  If you have a high blood pressure reading during one visit or you have normal blood pressure with other risk factors, you may be asked to:  Return on a different day to have your blood pressure checked again.  Monitor your blood pressure at home for 1 week or longer.  If you are diagnosed with hypertension, you may have other blood or imaging tests to help your health care provider understand your overall risk for other conditions.  How is this treated?  This condition is treated by making healthy lifestyle changes, such as eating healthy foods, exercising more, and reducing your alcohol intake. You may be referred for counseling on a healthy diet and physical activity.  Your health care provider may prescribe medicine if lifestyle changes are not enough to get your blood pressure under control and if:  Your systolic blood pressure is above 130.  Your diastolic blood pressure is above 80.  Your personal target blood pressure may vary depending on your medical conditions, your age, and other factors.  Follow these instructions at home:  Eating and drinking    Eat a diet that is high in fiber and potassium, and low in sodium, added sugar, and fat. An example of this eating plan is called the DASH diet. DASH stands for Dietary Approaches to Stop Hypertension. To eat this way:  Eat plenty of fresh fruits and vegetables. Try to fill one half of your plate at each meal with fruits and vegetables.  Eat whole grains, such as whole-wheat pasta, brown rice, or whole-grain bread. Fill about one fourth of your plate with whole grains.  Eat or drink low-fat dairy products, such as skim milk or low-fat yogurt.  Avoid  fatty cuts of meat, processed or cured meats, and poultry with skin. Fill about one fourth of your plate with lean proteins, such as fish, chicken without skin, beans, eggs, or tofu.  Avoid pre-made and processed foods. These tend to be higher in sodium, added sugar, and fat.  Reduce your daily sodium intake. Many people with hypertension should eat less than 1,500 mg of sodium a day.  Do not drink alcohol if:  Your health care provider tells you not to drink.  You are pregnant, may be pregnant, or are planning to become pregnant.  If you drink alcohol:  Limit how much you have to:  0-1 drink a day for women.  0-2 drinks a day for men.  Know how much alcohol is in your drink. In the [place] one drink equals one 12 oz bottle of beer (355 mL), one 5 oz glass of wine (148 mL), or one 1½ oz glass of hard liquor (44 mL).  Lifestyle    Work with your health care provider to maintain a healthy body weight or to lose weight. Ask what an ideal weight is for you.  Get at least 30 minutes of exercise that causes your heart to beat faster (aerobic exercise) most days of the week. Activities may include walking, swimming, or biking.  Include exercise to strengthen your muscles (resistance exercise), such as Pilates or lifting weights, as part of your weekly exercise routine. Try to do these types of exercises for 30 minutes at least 3 days a week.  Do not use any products that contain nicotine or tobacco. These products include cigarettes, chewing tobacco, and vaping devices, such as e-cigarettes. If you need help quitting, ask your health care provider.  Monitor your blood pressure at home as told by your health care provider.  Keep all follow-up visits. This is important.  Medicines  Take over-the-counter and prescription medicines only as told by your health care provider. Follow directions carefully. Blood pressure medicines must be taken as prescribed.  Do not skip doses of blood pressure medicine. Doing this puts you at risk  for problems and can make the medicine less effective.  Ask your health care provider about side effects or reactions to medicines that you should watch for.  Contact a health care provider if you:  Think you are having a reaction to a medicine you are taking.  Have headaches that keep coming back (recurring).  Feel dizzy.  Have swelling in your ankles.  Have trouble with your vision.  Get help right away if you:  Develop a severe headache or confusion.  Have unusual weakness or numbness.  Feel faint.  Have severe pain in your chest or abdomen.  Vomit repeatedly.  Have trouble breathing.  These symptoms may be an emergency. Get help right away. Call 911.  Do not wait to see if the symptoms will go away.  Do not drive yourself to the hospital.  Summary  Hypertension is when the force of blood pumping through your arteries is too strong. If this condition is not controlled, it may put you at risk for serious complications.  Your personal target blood pressure may vary depending on your medical conditions, your age, and other factors. For most people, a normal blood pressure is less than 120/80.  Hypertension is treated with lifestyle changes, medicines, or a combination of both. Lifestyle changes include losing weight, eating a healthy, low-sodium diet, exercising more, and limiting alcohol.  This information is not intended to replace advice given to you by your health care provider. Make sure you discuss any questions you have with your health care provider.  Document Revised: 10/25/2022 Document Reviewed: 10/25/2022  [employer] Patient Education © 2024 [employer]  High Cholesterol    High cholesterol is a condition in which the blood has high levels of a white, waxy substance similar to fat (cholesterol). The liver makes all the cholesterol that the body needs. The human body needs small amounts of cholesterol to help build cells. A person gets extra or excess cholesterol from the food that he or she eats.  The  "blood carries cholesterol from the liver to the rest of the body. If you have high cholesterol, deposits (plaques) may build up on the walls of your arteries. Arteries are the blood vessels that carry blood away from your heart. These plaques make the arteries narrow and stiff.  Cholesterol plaques increase your risk for heart attack and stroke. Work with your health care provider to keep your cholesterol levels in a healthy range.  What increases the risk?  The following factors may make you more likely to develop this condition:  Eating foods that are high in animal fat (saturated fat) or cholesterol.  Being overweight.  Not getting enough exercise.  A family history of high cholesterol (familial hypercholesterolemia).  Use of tobacco products.  Having diabetes.  What are the signs or symptoms?  In most cases, high cholesterol does not usually cause any symptoms.  In severe cases, very high cholesterol levels can cause:  Fatty bumps under the skin (xanthomas).  A white or gray ring around the black center (pupil) of the eye.  How is this diagnosed?  This condition may be diagnosed based on the results of a blood test.  If you are older than 20 years of age, your health care provider may check your cholesterol levels every 4-6 years.  You may be checked more often if you have high cholesterol or other risk factors for heart disease.  The blood test for cholesterol measures:  \"Bad\" cholesterol, or LDL cholesterol. This is the main type of cholesterol that causes heart disease. The desired level is less than 100 mg/dL (2.59 mmol/L).  \"Good\" cholesterol, or HDL cholesterol. HDL helps protect against heart disease by cleaning the arteries and carrying the LDL to the liver for processing. The desired level for HDL is 60 mg/dL (1.55 mmol/L) or higher.  Triglycerides. These are fats that your body can store or burn for energy. The desired level is less than 150 mg/dL (1.69 mmol/L).  Total cholesterol. This measures the " total amount of cholesterol in your blood and includes LDL, HDL, and triglycerides. The desired level is less than 200 mg/dL (5.17 mmol/L).  How is this treated?  Treatment for high cholesterol starts with lifestyle changes, such as diet and exercise.  Diet changes. You may be asked to eat foods that have more fiber and less saturated fats or added sugar.  Lifestyle changes. These may include regular exercise, maintaining a healthy weight, and quitting use of tobacco products.  Medicines. These are given when diet and lifestyle changes have not worked. You may be prescribed a statin medicine to help lower your cholesterol levels.  Follow these instructions at home:  Eating and drinking    Eat a healthy, balanced diet. This diet includes:  Daily servings of a variety of fresh, frozen, or canned fruits and vegetables.  Daily servings of whole grain foods that are rich in fiber.  Foods that are low in saturated fats and trans fats. These include poultry and fish without skin, lean cuts of meat, and low-fat dairy products.  A variety of fish, especially oily fish that contain omega-3 fatty acids. Aim to eat fish at least 2 times a week.  Avoid foods and drinks that have added sugar.  Use healthy cooking methods, such as roasting, grilling, broiling, baking, poaching, steaming, and stir-frying. Do not hutton your food except for stir-frying.  If you drink alcohol:  Limit how much you have to:  0-1 drink a day for women who are not pregnant.  0-2 drinks a day for men.  Know how much alcohol is in a drink. In the [place] one drink equals one 12 oz bottle of beer (355 mL), one 5 oz glass of wine (148 mL), or one 1½ oz glass of hard liquor (44 mL).  Lifestyle    Get regular exercise. Aim to exercise for a total of 150 minutes a week. Increase your activity level by doing activities such as gardening, walking, and taking the stairs.  Do not use any products that contain nicotine or tobacco. These products include cigarettes,  "chewing tobacco, and vaping devices, such as e-cigarettes. If you need help quitting, ask your health care provider.  General instructions  Take over-the-counter and prescription medicines only as told by your health care provider.  Keep all follow-up visits. This is important.  Where to find more information  American Heart Association: www.heart.org  National Heart, Lung, and [facility] [place]: www.nhlbi.nih.gov  Contact a health care provider if:  You have trouble achieving or maintaining a healthy diet or weight.  You are starting an exercise program.  You are unable to stop smoking.  Get help right away if:  You have chest pain.  You have trouble breathing.  You have discomfort or pain in your jaw, neck, back, shoulder, or arm.  You have any symptoms of a stroke. \"BE FAST\" is an easy way to remember the main warning signs of a stroke:  B - Balance. Signs are dizziness, sudden trouble walking, or loss of balance.  E - Eyes. Signs are trouble seeing or a sudden change in vision.  F - Face. Signs are sudden weakness or numbness of the face, or the face or eyelid drooping on one side.  A - Arms. Signs are weakness or numbness in an arm. This happens suddenly and usually on one side of the body.  S - Speech. Signs are sudden trouble speaking, slurred speech, or trouble understanding what people say.  T - Time. Time to call emergency services. Write down what time symptoms started.  You have other signs of a stroke, such as:  A sudden, severe headache with no known cause.  Nausea or vomiting.  Seizure.  These symptoms may represent a serious problem that is an emergency. Do not wait to see if the symptoms will go away. Get medical help right away. Call your local emergency services (911 in the [place] Do not drive yourself to the hospital.  Summary  Cholesterol plaques increase your risk for heart attack and stroke. Work with your health care provider to keep your cholesterol levels in a healthy range.  Eat a healthy, " balanced diet, get regular exercise, and maintain a healthy weight.  Do not use any products that contain nicotine or tobacco. These products include cigarettes, chewing tobacco, and vaping devices, such as e-cigarettes.  Get help right away if you have any symptoms of a stroke.  This information is not intended to replace advice given to you by your health care provider. Make sure you discuss any questions you have with your health care provider.  Document Revised: 07/21/2023 Document Reviewed: 02/21/2022  [employer] Patient Education © 2024 [employer]

## 2024-07-10 NOTE — PROGRESS NOTES
The ABCs of the Annual Wellness Visit  Subsequent Medicare Wellness Visit    Subjective    Steve Santoyo is a 79 y.o. male who presents for a Subsequent Medicare Wellness Visit.    The following portions of the patient's history were reviewed and   updated as appropriate: allergies, current medications, past family history, past medical history, past social history, past surgical history, and problem list.    Compared to one year ago, the patient feels his physical   health is the same.    Compared to one year ago, the patient feels his mental   health is the same.    Recent Hospitalizations:  He was not admitted to the hospital during the last year.       Current Medical Providers:  Patient Care Team:  Waqar Blood MD as PCP - General (Family Medicine)    Outpatient Medications Prior to Visit   Medication Sig Dispense Refill    amLODIPine (NORVASC) 10 MG tablet Take 1 tablet by mouth Daily. 90 tablet 3    aspirin 81 MG EC tablet Take 1 tablet by mouth Daily. 30 tablet 3    esomeprazole (nexIUM) 40 MG capsule Take 1 capsule by mouth Every Morning Before Breakfast. 90 capsule 1    ezetimibe (ZETIA) 10 MG tablet Take 1 tablet by mouth Daily. 90 tablet 3    metoprolol tartrate (LOPRESSOR) 50 MG tablet Take 1 tablet by mouth 2 (Two) Times a Day. 180 tablet 3    nitroglycerin (NITROSTAT) 0.4 MG SL tablet 1 under the tongue as needed for angina, may repeat q5mins for up three doses 25 tablet 1    rosuvastatin (CRESTOR) 10 MG tablet Take 1 tablet by mouth Daily. 90 tablet 3    sildenafil (VIAGRA) 50 MG tablet TAKE 1 TABLET BY MOUTH DAILY AS NEEDED FOR ERECTILE DYSFUNCTION. 10 tablet 0    levothyroxine (SYNTHROID, LEVOTHROID) 100 MCG tablet Take 1 tablet by mouth Daily for 90 days. 90 tablet 0     No facility-administered medications prior to visit.       No opioid medication identified on active medication list. I have reviewed chart for other potential  high risk medication/s and harmful drug interactions in the  "elderly.        Aspirin is on active medication list. Aspirin use is indicated based on review of current medical condition/s. Pros and cons of this therapy have been discussed today. Benefits of this medication outweigh potential harm.  Patient has been encouraged to continue taking this medication.  .      Patient Active Problem List   Diagnosis    IHD (ischemic heart disease)    Essential hypertension    Hypercholesteremia    Hypothyroidism    Metabolic syndrome    Shortness of breath    Stage 3a chronic kidney disease     Advance Care Planning   Advance Care Planning     Advance Directive is not on file.  ACP discussion was held with the patient during this visit. Patient does not have an advance directive, information provided.     Objective    Vitals:    07/10/24 1107 07/10/24 1122   BP: 140/60 138/75   BP Location: Left arm Left arm   Patient Position: Sitting Sitting   Cuff Size: Adult Adult   Pulse: 78    Resp: 18    Temp: 97 °F (36.1 °C)    TempSrc: Temporal    SpO2: 98%    Weight: 99.8 kg (220 lb)    Height: 177.8 cm (70\")      Estimated body mass index is 31.57 kg/m² as calculated from the following:    Height as of this encounter: 177.8 cm (70\").    Weight as of this encounter: 99.8 kg (220 lb).    BMI is >= 30 and <35. (Class 1 Obesity). The following options were offered after discussion;: weight loss educational material (shared in after visit summary) and exercise counseling/recommendations      Does the patient have evidence of cognitive impairment? No          HEALTH RISK ASSESSMENT    Smoking Status:  Social History     Tobacco Use   Smoking Status Former    Current packs/day: 0.00    Average packs/day: 1 pack/day for 15.0 years (15.0 ttl pk-yrs)    Types: Cigarettes    Start date: 1971    Quit date: 1986    Years since quittin.5   Smokeless Tobacco Never     Alcohol Consumption:  Social History     Substance and Sexual Activity   Alcohol Use No     Fall Risk Screen:    STEADI Fall " Risk Assessment was completed, and patient is at MODERATE risk for falls. Assessment completed on:7/10/2024    Depression Screenin/10/2024    11:15 AM   PHQ-2/PHQ-9 Depression Screening   Little Interest or Pleasure in Doing Things 0-->not at all   Feeling Down, Depressed or Hopeless 0-->not at all   PHQ-9: Brief Depression Severity Measure Score 0       Health Habits and Functional and Cognitive Screenin/10/2024    11:11 AM   Functional & Cognitive Status   Do you have difficulty preparing food and eating? No   Do you have difficulty bathing yourself, getting dressed or grooming yourself? No   Do you have difficulty using the toilet? No   Do you have difficulty moving around from place to place? Yes   Do you have trouble with steps or getting out of a bed or a chair? Yes   Current Diet Well Balanced Diet   Dental Exam Up to date   Eye Exam Up to date   Exercise (times per week) 3 times per week   Current Exercises Include Light Weights   Do you need help using the phone?  No   Are you deaf or do you have serious difficulty hearing?  Yes   Do you need help to go to places out of walking distance? Yes   Do you need help shopping? No   Do you need help preparing meals?  No   Do you need help with housework?  No   Do you need help with laundry? No   Do you need help taking your medications? No   Do you need help managing money? No   Do you ever drive or ride in a car without wearing a seat belt? No   Have you felt unusual stress, anger or loneliness in the last month? No   Who do you live with? Alone   If you need help, do you have trouble finding someone available to you? No   Have you been bothered in the last four weeks by sexual problems? No   Do you have difficulty concentrating, remembering or making decisions? Yes       Age-appropriate Screening Schedule:  Refer to the list below for future screening recommendations based on patient's age, sex and/or medical conditions. Orders for these  "recommended tests are listed in the plan section. The patient has been provided with a written plan.    Health Maintenance   Topic Date Due    BMI FOLLOWUP  05/02/2024    ANNUAL WELLNESS VISIT  06/14/2024    ZOSTER VACCINE (1 of 2) 07/01/2025 (Originally 7/5/1994)    RSV Vaccine - Adults (1 - 1-dose 60+ series) 07/07/2025 (Originally 7/5/2004)    INFLUENZA VACCINE  08/01/2024    LIPID PANEL  11/15/2024    COLORECTAL CANCER SCREENING  09/15/2030    TDAP/TD VACCINES (2 - Td or Tdap) 03/13/2033    Pneumococcal Vaccine 65+  Completed    COVID-19 Vaccine  Discontinued                  CMS Preventative Services Quick Reference  Risk Factors Identified During Encounter  Immunizations Discussed/Encouraged: Shingrix and RSV (Respiratory Syncytial Virus)  The above risks/problems have been discussed with the patient.  Pertinent information has been shared with the patient in the After Visit Summary.  An After Visit Summary and PPPS were made available to the patient.    Follow Up:   Next Medicare Wellness visit to be scheduled in 1 year.       Additional E&M Note during same encounter follows:  Patient has multiple medical problems which are significant and separately identifiable that require additional work above and beyond the Medicare Wellness Visit.      Chief Complaint  Annual Exam (Medicare wellness)    Subjective        HPI  Steve Santoyo is also being seen today for his hypertension, hyperlipidemia, IHD, hypothyroidism, and CKD. The patient also has an itchy rash on the right ankle for 2 months that is not getting any better with OTC hydrocortisone         Objective   Vital Signs:  /75 (BP Location: Left arm, Patient Position: Sitting, Cuff Size: Adult)   Pulse 78   Temp 97 °F (36.1 °C) (Temporal)   Resp 18   Ht 177.8 cm (70\")   Wt 99.8 kg (220 lb)   SpO2 98%   BMI 31.57 kg/m²     Physical Exam  Constitutional:       Appearance: Normal appearance. He is obese.   HENT:      Head: Normocephalic and " atraumatic.      Right Ear: Tympanic membrane and ear canal normal.      Left Ear: Tympanic membrane and ear canal normal.      Nose: Nose normal.      Mouth/Throat:      Mouth: Mucous membranes are moist.      Pharynx: Oropharynx is clear.   Eyes:      Extraocular Movements: Extraocular movements intact.      Conjunctiva/sclera: Conjunctivae normal.      Pupils: Pupils are equal, round, and reactive to light.   Cardiovascular:      Rate and Rhythm: Normal rate and regular rhythm.      Pulses: Normal pulses.      Heart sounds: Normal heart sounds. No murmur heard.  Pulmonary:      Effort: Pulmonary effort is normal.      Breath sounds: Normal breath sounds. No decreased breath sounds, wheezing, rhonchi or rales.   Abdominal:      General: Abdomen is protuberant. Bowel sounds are normal.      Tenderness: There is no abdominal tenderness. There is no right CVA tenderness, left CVA tenderness, guarding or rebound.      Hernia: No hernia is present.   Musculoskeletal:      Cervical back: Normal range of motion and neck supple.      Right lower leg: No edema.      Left lower leg: No edema.   Feet:      Right foot:      Skin integrity: Skin integrity normal.      Toenail Condition: Right toenails are normal.      Left foot:      Toenail Condition: Left toenails are normal.   Lymphadenopathy:      Head:      Right side of head: No submental, submandibular, preauricular, posterior auricular or occipital adenopathy.      Left side of head: No submental, submandibular, preauricular, posterior auricular or occipital adenopathy.      Cervical: No cervical adenopathy.      Upper Body:      Right upper body: No supraclavicular or axillary adenopathy.      Left upper body: No supraclavicular or axillary adenopathy.   Skin:     General: Skin is warm.      Capillary Refill: Capillary refill takes less than 2 seconds.      Findings: Rash (right ankle: eruthematous patch with scaling) present.      Nails: There is no clubbing.    Neurological:      General: No focal deficit present.      Mental Status: He is alert and oriented to person, place, and time.      Sensory: Sensation is intact.      Motor: Motor function is intact.      Coordination: Coordination is intact.      Gait: Gait is intact.      Deep Tendon Reflexes: Reflexes are normal and symmetric.   Psychiatric:         Attention and Perception: Attention and perception normal.         Mood and Affect: Mood and affect normal.         Speech: Speech normal.         Behavior: Behavior normal. Behavior is cooperative.         Thought Content: Thought content normal.         Cognition and Memory: Cognition normal.         Judgment: Judgment normal.                         Assessment and Plan   Diagnoses and all orders for this visit:    1. Medicare annual wellness visit, subsequent (Primary)  Comments:  [facility] will [facility].    2. Essential hypertension  Assessment & Plan:  Hypertension is stable and controlled  Continue current treatment regimen.  Dietary sodium restriction.  Blood pressure will be reassessed in 6 months.      3. Hypercholesteremia  Assessment & Plan:   Lipid abnormalities are stable    Plan:  Continue same medication/s without change.      Discussed medication dosage, use, side effects, and goals of treatment in detail.    Counseled patient on lifestyle modifications to help control hyperlipidemia.     Patient Treatment Goals:   LDL goal is less than 70    Followup in 6 months.      4. IHD (ischemic heart disease)  Assessment & Plan:  Coronary Artery Disease (OPTIONAL): Coronary artery disease is stable.  Continue current treatment regimen. Dietary sodium restriction. Weight loss. Follows with Cardiology.  Cardiac status will be reassessed in 6 months.      5. Hypothyroidism, unspecified type  Assessment & Plan:  Stable on Levothyroxine.    Orders:  -     levothyroxine (SYNTHROID, LEVOTHROID) 100 MCG tablet; Take 1 tablet by mouth  Daily for 90 days.  Dispense: 90 tablet; Refill: 1    6. Tinea  Comments:  right ankle  Orders:  -     clotrimazole-betamethasone (LOTRISONE) 1-0.05 % cream; Apply 1 Application topically to the appropriate area as directed 2 (Two) Times a Day for 23 days.  Dispense: 45 g; Refill: 0           I spent 30 minutes caring for Steve on this date of service. This time includes time spent by me in the following activities:preparing for the visit, performing a medically appropriate examination and/or evaluation , counseling and educating the patient/family/caregiver, ordering medications, tests, or procedures, and documenting information in the medical record  Follow Up   Return in about 6 months (around 1/10/2025).  Patient was given instructions and counseling regarding his condition or for health maintenance advice. Please see specific information pulled into the AVS if appropriate.     The patient is advised to continue all of his regular medications as prescribed. He was counseled regarding the importance of diet, exercise and medication compliance.    The preventive exam has been reviewed in detail.  The patient has been fully counseled on preventative guidelines for vaccines, cancer screenings, and other health maintenance needs.   The patient has been counseled on guidelines for maintaining a lifestyle to promote good health and to minimize chronic diseases.  The patient has been assisted with scheduling these healthcare procedures for the coming year and given a written document of health maintenance and anticipatory guidance for age with the AVS.      This document has been electronically signed by Waqar Blood MD  July 10, 2024 11:57 EDT

## 2024-07-17 DIAGNOSIS — B35.9 TINEA: ICD-10-CM

## 2024-07-18 RX ORDER — CLOTRIMAZOLE AND BETAMETHASONE DIPROPIONATE 10; .64 MG/G; MG/G
1 CREAM TOPICAL 2 TIMES DAILY
Qty: 45 G | Refills: 0 | OUTPATIENT
Start: 2024-07-18 | End: 2024-08-10

## 2024-08-05 DIAGNOSIS — N52.9 ERECTILE DYSFUNCTION, UNSPECIFIED ERECTILE DYSFUNCTION TYPE: ICD-10-CM

## 2024-08-05 RX ORDER — SILDENAFIL 50 MG/1
50 TABLET, FILM COATED ORAL DAILY PRN
Qty: 10 TABLET | Refills: 0 | Status: SHIPPED | OUTPATIENT
Start: 2024-08-05

## 2024-08-05 NOTE — TELEPHONE ENCOUNTER
Caller: [email or web address]    Relationship: Self    Best call back number: [phone]     Requested Prescriptions:   Requested Prescriptions     Pending Prescriptions Disp Refills    sildenafil (VIAGRA) 50 MG tablet 10 tablet 0     Sig: Take 1 tablet by mouth Daily As Needed for Erectile Dysfunction.        Pharmacy where request should be sent: [id number] - [place], [place] - [address] [address] - [phone] [facility] [phone] FX     Last office visit with prescribing clinician: 7/10/2024   Last telemedicine visit with prescribing clinician: Visit date not found   Next office visit with prescribing clinician: 1/10/2025     Additional details provided by patient: NA    Does the patient have less than a 3 day supply:  [] Yes  [x] No    Would you like a call back once the refill request has been completed: [] Yes [x] No    If the office needs to give you a call back, can they leave a voicemail: [] Yes [x] No    Kedar Nicholson Rep   08/05/24 13:57 EDT

## 2024-08-20 ENCOUNTER — OFFICE VISIT (OUTPATIENT)
Dept: CARDIOLOGY | Facility: CLINIC | Age: 79
End: 2024-08-20
Payer: MEDICARE

## 2024-08-20 VITALS
HEIGHT: 70 IN | WEIGHT: 203 LBS | BODY MASS INDEX: 29.06 KG/M2 | SYSTOLIC BLOOD PRESSURE: 140 MMHG | DIASTOLIC BLOOD PRESSURE: 62 MMHG | HEART RATE: 71 BPM | RESPIRATION RATE: 18 BRPM | OXYGEN SATURATION: 98 %

## 2024-08-20 DIAGNOSIS — I10 ESSENTIAL HYPERTENSION: ICD-10-CM

## 2024-08-20 DIAGNOSIS — I25.9 IHD (ISCHEMIC HEART DISEASE): Primary | ICD-10-CM

## 2024-08-20 DIAGNOSIS — E03.9 ACQUIRED HYPOTHYROIDISM: ICD-10-CM

## 2024-08-20 DIAGNOSIS — R42 DIZZINESS: ICD-10-CM

## 2024-08-20 DIAGNOSIS — E66.3 OVERWEIGHT: ICD-10-CM

## 2024-08-20 DIAGNOSIS — E78.00 HYPERCHOLESTEREMIA: ICD-10-CM

## 2024-08-20 DIAGNOSIS — N18.31 STAGE 3A CHRONIC KIDNEY DISEASE: ICD-10-CM

## 2024-08-20 PROCEDURE — 99214 OFFICE O/P EST MOD 30 MIN: CPT | Performed by: NURSE PRACTITIONER

## 2024-08-20 PROCEDURE — 3078F DIAST BP <80 MM HG: CPT | Performed by: NURSE PRACTITIONER

## 2024-08-20 PROCEDURE — 3077F SYST BP >= 140 MM HG: CPT | Performed by: NURSE PRACTITIONER

## 2024-08-20 RX ORDER — ROSUVASTATIN CALCIUM 10 MG/1
10 TABLET, COATED ORAL DAILY
Qty: 90 TABLET | Refills: 3 | Status: SHIPPED | OUTPATIENT
Start: 2024-08-20

## 2024-08-20 RX ORDER — METOPROLOL TARTRATE 50 MG/1
50 TABLET, FILM COATED ORAL 2 TIMES DAILY
Qty: 180 TABLET | Refills: 3 | Status: SHIPPED | OUTPATIENT
Start: 2024-08-20

## 2024-08-20 RX ORDER — AMLODIPINE BESYLATE 10 MG/1
10 TABLET ORAL DAILY
Qty: 90 TABLET | Refills: 3 | Status: SHIPPED | OUTPATIENT
Start: 2024-08-20

## 2024-08-20 RX ORDER — EZETIMIBE 10 MG/1
10 TABLET ORAL DAILY
Qty: 90 TABLET | Refills: 3 | Status: SHIPPED | OUTPATIENT
Start: 2024-08-20

## 2024-08-20 NOTE — PROGRESS NOTES
Chief Complaint   Patient presents with    Follow-up     Pt denies any CP, SOB or palpitations but does still experience dizziness.    Med Refill     Patient verified medication verbally    Labs Only     [facility]       Cardiac Complaints  Dizziness      Subjective   Steve Santoyo is a 79 y.o. 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  In 2003 found to have worsening of disease involving the left main and the LAD and underwent two-vessel bypass surgery.  In 2014 found that the vein graft to the obtuse marginal was occluded and RCA had new lesion for which he underwent stenting.  Since then he has undergone stress test twice once in 2016 other one is 2019 and both of them showed some changes in the inferior wall.  He did not have any more anginal symptoms have been managing him medically.  In 2022, more chest pain and SOA noted, stress and echo advised. Stress showed EF 61% with inferolateral infarct and mild michela infarct ischemia, norvasc dosing increased for HTN response. Carotid US 2/22/2024 showed plaque without stenosis.     He comes today for follow up and denies any new concerns. No CP, SOA, palpitations, or syncope noted. He does have dizziness, unchanged from prior. No falls reported. [facility], [facility] in [facility]. Labs showed lipids well managed, GFR of 43, and TSH of over 13.          Cardiac History  Past Surgical History:   Procedure Laterality Date    CARDIAC CATHETERIZATION  08/13/2001    100% LCX    CARDIAC CATHETERIZATION  04/24/2002    100% LCX    CARDIAC CATHETERIZATION  12/16/2003    50% LM, 70% LAD, 100% LCX    CARDIAC CATHETERIZATION  06/24/2014    patent LIMA-LAD. 100% SVG-OM, 90% RCA-3.0x26 resolute    CARDIOVASCULAR STRESS TEST  09/20/2007    7 min, 70% THR, negative    CARDIOVASCULAR STRESS TEST  12/01/2008    7 min, 30 sec., 68% THR. R/O lateral ischemia    CARDIOVASCULAR  STRESS TEST  04/12/2010    S. Echo- 7min, 30sec, 79% THR. Negative    CARDIOVASCULAR STRESS TEST  04/13/2012    L. Myoview- small apical ischemia    CARDIOVASCULAR STRESS TEST  02/10/2016    L. Myoview- Inferolateral Ischemia. EF > 65%    CARDIOVASCULAR STRESS TEST  03/13/2019    L.Cardiolite- EF 66%. Inferolateral Ischemia.    CARDIOVASCULAR STRESS TEST  07/06/2022    Lexiscan- EF 61%. BP- 178/87. Inferolateral Infarct with Krissy Infarct Ischemia    CORONARY ARTERY BYPASS GRAFT  12/18/2003    MCNEAL-LAD, SVG-OM.    ECHO - CONVERTED  09/20/2007    EF >60%, Mild to mod MR.    ECHO - CONVERTED  12/01/2008    EF>60%, Lateral WMA. RVSP- 42mmHg    ECHO - CONVERTED  04/13/2012    EF 60-65%, aortic root 4.1cm    ECHO - CONVERTED  07/01/2015    EF 65%    ECHO - CONVERTED  02/10/2016    EF 60%    ECHO - CONVERTED  03/13/2019    EF 65%. Mild MR. LA- 3.9 Cm    ECHO - CONVERTED  07/06/2022    TLS. EF 65%. LA- 4.4. Mild MR. RVSP- 24 mmHg    OTHER SURGICAL HISTORY  04/14/2011    CT head- negative    OTHER SURGICAL HISTORY  03/06/2017    BISHNU- no hemodynamically significant stenosis noted either side    US CAROTID UNILATERAL  04/14/2011    no sig stenosis    US CAROTID UNILATERAL  02/22/2024    Plaque bilaterally without stenosis       Current Outpatient Medications   Medication Sig Dispense Refill    amLODIPine (NORVASC) 10 MG tablet Take 1 tablet by mouth Daily. 90 tablet 3    aspirin 81 MG EC tablet Take 1 tablet by mouth Daily. 30 tablet 3    esomeprazole (nexIUM) 40 MG capsule Take 1 capsule by mouth Every Morning Before Breakfast. 90 capsule 1    ezetimibe (ZETIA) 10 MG tablet Take 1 tablet by mouth Daily. 90 tablet 3    levothyroxine (SYNTHROID, LEVOTHROID) 100 MCG tablet Take 1 tablet by mouth Daily for 90 days. 90 tablet 1    metoprolol tartrate (LOPRESSOR) 50 MG tablet Take 1 tablet by mouth 2 (Two) Times a Day. 180 tablet 3    nitroglycerin (NITROSTAT) 0.4 MG SL tablet 1 under the tongue as needed for angina, may repeat  q5mins for up three doses 25 tablet 1    rosuvastatin (CRESTOR) 10 MG tablet Take 1 tablet by mouth Daily. 90 tablet 3    sildenafil (VIAGRA) 50 MG tablet Take 1 tablet by mouth Daily As Needed for Erectile Dysfunction. 10 tablet 0     No current facility-administered medications for this visit.       Patient has no known allergies.    Past Medical History:   Diagnosis Date    Arthritis     GERD (gastroesophageal reflux disease)     Hypercholesterolemia     Hyperlipidemia     Hypertension     Hypothyroidism     IHD (ischemic heart disease)     PUD (peptic ulcer disease)        Social History     Socioeconomic History    Marital status:    Tobacco Use    Smoking status: Former     Current packs/day: 0.00     Average packs/day: 1 pack/day for 15.0 years (15.0 ttl pk-yrs)     Types: Cigarettes     Start date: 1971     Quit date: 1986     Years since quittin.6    Smokeless tobacco: Never   Vaping Use    Vaping status: Never Used   Substance and Sexual Activity    Alcohol use: No    Drug use: No       Family History   Problem Relation Age of Onset    Heart attack Mother     Heart disease Mother         stent placement     Heart attack Father         multiple    Lung disease Father     Heart attack Brother     Heart disease Brother         CABG at age 42       Review of Systems   Constitutional: Negative for malaise/fatigue and night sweats.   Cardiovascular:  Negative for chest pain, claudication, dyspnea on exertion, irregular heartbeat, leg swelling, near-syncope, orthopnea, palpitations and syncope.   Respiratory:  Negative for cough, shortness of breath and wheezing.    Musculoskeletal:  Negative for back pain, joint pain and stiffness.   Gastrointestinal:  Negative for anorexia, heartburn, nausea and vomiting.   Genitourinary:  Negative for dysuria, hematuria, hesitancy and nocturia.   Neurological:  Positive for dizziness and light-headedness.   Psychiatric/Behavioral:  Negative for  "depression and memory loss. The patient is not nervous/anxious.            Objective     /62 (BP Location: Right arm, Patient Position: Sitting, Cuff Size: Adult)   Pulse 71   Resp 18   Ht 177.8 cm (70\")   Wt 92.1 kg (203 lb)   SpO2 98%   BMI 29.13 kg/m²     Constitutional:       Appearance: Not in distress.   Eyes:      Pupils: Pupils are equal, round, and reactive to light.   HENT:      Nose: Nose normal.   Pulmonary:      Effort: Pulmonary effort is normal.      Breath sounds: Normal breath sounds.   Cardiovascular:      PMI at left midclavicular line. Normal rate. Regular rhythm.      Murmurs: There is a systolic murmur.   Abdominal:      Palpations: Abdomen is soft.   Musculoskeletal: Normal range of motion.      Cervical back: Normal range of motion and neck supple. Skin:     General: Skin is warm and dry.   Neurological:      Mental Status: Alert.         Procedures         Diagnoses and all orders for this visit:    1. IHD (ischemic heart disease) (Primary)    2. Essential hypertension  -     amLODIPine (NORVASC) 10 MG tablet; Take 1 tablet by mouth Daily.  Dispense: 90 tablet; Refill: 3    3. Hypercholesteremia  -     ezetimibe (ZETIA) 10 MG tablet; Take 1 tablet by mouth Daily.  Dispense: 90 tablet; Refill: 3  -     rosuvastatin (CRESTOR) 10 MG tablet; Take 1 tablet by mouth Daily.  Dispense: 90 tablet; Refill: 3    4. Dizziness    5. Stage 3a chronic kidney disease    6. Acquired hypothyroidism    7. Overweight    Other orders  -     metoprolol tartrate (LOPRESSOR) 50 MG tablet; Take 1 tablet by mouth 2 (Two) Times a Day.  Dispense: 180 tablet; Refill: 3             IHD: CP is denied. Patient defers further testing as asymptomatic.     HTN:  Blood pressure stable. Will continue norvasc and lopressor at same. Limited sodium urged.     Hyperlipidemia:  On statin therapy with crestor and zetia therapy without concerns. FLP checked in July, showed lipids at goal.     CKD: GFR at around 43, [facility] " [facility], he is unsure if he is seeing renal. Will discuss with you.     Hypothyroid:  On synthroid therapy without concerns, HR at goal.  TSH showed that his TSH was not at goal. He can not recall if his synthroid was adjusted, will discuss with you.     BMI noted at 29.31, good cardiac diet with limited carbs, calories, and activity as tolerated advised. He has 10 pound weight gain, encouraged on limiting starch.     Refills per request     6 month follow up recommended or sooner if needed              Problems Addressed this Visit          Cardiac and Vasculature    IHD (ischemic heart disease) - Primary    Relevant Medications    amLODIPine (NORVASC) 10 MG tablet    metoprolol tartrate (LOPRESSOR) 50 MG tablet    Essential hypertension    Relevant Medications    amLODIPine (NORVASC) 10 MG tablet    metoprolol tartrate (LOPRESSOR) 50 MG tablet    Hypercholesteremia    Relevant Medications    ezetimibe (ZETIA) 10 MG tablet    rosuvastatin (CRESTOR) 10 MG tablet       Endocrine and Metabolic    Hypothyroidism    Relevant Medications    metoprolol tartrate (LOPRESSOR) 50 MG tablet       Genitourinary and Reproductive     Stage 3a chronic kidney disease     Other Visit Diagnoses       Dizziness        Overweight              Diagnoses         Codes Comments    IHD (ischemic heart disease)    -  Primary ICD-10-CM: I25.9  ICD-9-CM: 414.9     Essential hypertension     ICD-10-CM: I10  ICD-9-CM: 401.9     Hypercholesteremia     ICD-10-CM: E78.00  ICD-9-CM: 272.0     Dizziness     ICD-10-CM: R42  ICD-9-CM: 780.4     Stage 3a chronic kidney disease     ICD-10-CM: N18.31  ICD-9-CM: 585.3     Acquired hypothyroidism     ICD-10-CM: E03.9  ICD-9-CM: 244.9     Overweight     ICD-10-CM: E66.3  ICD-9-CM: 278.02                            Electronically signed by MARTHA Adames August 20, 2024 16:00 EDT

## 2024-10-15 DIAGNOSIS — N52.9 ERECTILE DYSFUNCTION, UNSPECIFIED ERECTILE DYSFUNCTION TYPE: ICD-10-CM

## 2024-10-15 RX ORDER — SILDENAFIL 50 MG/1
50 TABLET, FILM COATED ORAL DAILY PRN
Qty: 10 TABLET | Refills: 0 | OUTPATIENT
Start: 2024-10-15

## 2024-12-02 DIAGNOSIS — N52.9 ERECTILE DYSFUNCTION, UNSPECIFIED ERECTILE DYSFUNCTION TYPE: ICD-10-CM

## 2024-12-02 NOTE — TELEPHONE ENCOUNTER
Caller: Steve Santoyo    Relationship: Self    Best call back number: [phone]    Requested Prescriptions:   Requested Prescriptions     Pending Prescriptions Disp Refills    sildenafil (VIAGRA) 50 MG tablet 10 tablet 0     Sig: Take 1 tablet by mouth Daily As Needed for Erectile Dysfunction.        Pharmacy where request should be sent: [id number] - [place], [place] - [address] [address] - [phone] [facility] [phone] FX     Last office visit with prescribing clinician: 8/20/2024   Last telemedicine visit with prescribing clinician: Visit date not found   Next office visit with prescribing clinician: 3/4/2025     ADDITIONAL NOTES:PATIENT NO LONGER HAS PCP WANTS TO KNOW IF WE CAN REFILL THIS. UNTIL THEY GET PCP.    Does the patient have less than a 3 day supply:  [] Yes  [x] No    Would you like a call back once the refill request has been completed: [x] Yes [] No    If the office needs to give you a call back, can they leave a voicemail: [x] Yes [] No    Kedar Gurrola Rep   12/02/24 12:30 EST

## 2024-12-04 RX ORDER — SILDENAFIL 50 MG/1
50 TABLET, FILM COATED ORAL DAILY PRN
Qty: 10 TABLET | Refills: 0 | Status: SHIPPED | OUTPATIENT
Start: 2024-12-04

## 2025-03-04 ENCOUNTER — OFFICE VISIT (OUTPATIENT)
Dept: CARDIOLOGY | Facility: CLINIC | Age: 80
End: 2025-03-04
Payer: MEDICARE

## 2025-03-04 VITALS
WEIGHT: 208 LBS | SYSTOLIC BLOOD PRESSURE: 140 MMHG | HEART RATE: 61 BPM | DIASTOLIC BLOOD PRESSURE: 68 MMHG | BODY MASS INDEX: 29.78 KG/M2 | HEIGHT: 70 IN

## 2025-03-04 DIAGNOSIS — I44.0 FIRST DEGREE AV BLOCK: ICD-10-CM

## 2025-03-04 DIAGNOSIS — R06.02 SHORTNESS OF BREATH: ICD-10-CM

## 2025-03-04 DIAGNOSIS — E78.00 HYPERCHOLESTEREMIA: ICD-10-CM

## 2025-03-04 DIAGNOSIS — R42 DIZZINESS: ICD-10-CM

## 2025-03-04 DIAGNOSIS — I25.9 IHD (ISCHEMIC HEART DISEASE): Primary | ICD-10-CM

## 2025-03-04 DIAGNOSIS — I10 ESSENTIAL HYPERTENSION: ICD-10-CM

## 2025-03-04 DIAGNOSIS — E66.3 OVERWEIGHT: ICD-10-CM

## 2025-03-04 DIAGNOSIS — E03.9 ACQUIRED HYPOTHYROIDISM: ICD-10-CM

## 2025-03-04 DIAGNOSIS — N18.32 STAGE 3B CHRONIC KIDNEY DISEASE: ICD-10-CM

## 2025-03-04 RX ORDER — EZETIMIBE 10 MG/1
10 TABLET ORAL DAILY
Qty: 90 TABLET | Refills: 3 | Status: SHIPPED | OUTPATIENT
Start: 2025-03-04

## 2025-03-04 RX ORDER — METOPROLOL TARTRATE 50 MG
50 TABLET ORAL 2 TIMES DAILY
Qty: 180 TABLET | Refills: 3 | Status: SHIPPED | OUTPATIENT
Start: 2025-03-04

## 2025-03-04 RX ORDER — LEVOTHYROXINE SODIUM 100 UG/1
100 TABLET ORAL
COMMUNITY

## 2025-03-04 RX ORDER — AMLODIPINE BESYLATE 10 MG/1
10 TABLET ORAL DAILY
Qty: 90 TABLET | Refills: 3 | Status: SHIPPED | OUTPATIENT
Start: 2025-03-04

## 2025-03-04 RX ORDER — ROSUVASTATIN CALCIUM 10 MG/1
10 TABLET, COATED ORAL DAILY
Qty: 90 TABLET | Refills: 3 | Status: SHIPPED | OUTPATIENT
Start: 2025-03-04

## 2025-03-04 NOTE — PROGRESS NOTES
Chief Complaint   Patient presents with    Follow-up     For cardiac management. Patient is on aspirin. Last lab work was done on [facility], copy in [facility]. States that he still has shortness of breath, but states that it has not changed. States that he occasionally has palpitations. States that he has dizziness all the time. EKG done as one had not been done in 4 years.    Med Refill     Does not need refills at this time. Brought medication list with visit. Has refills to due him until August.        Cardiac Complaints  dyspnea and Dizziness      Subjective   Steve Santoyo is a 79 y.o. male with HTN, renal insufficiency, hyperlipidemia, and IHD diagnosed in 2001 when he was found to have an occluded circumflex which was confirmed in 2002 also.  In 2003 found to have worsening of disease involving the left main and the LAD and underwent two-vessel bypass surgery.  In 2014 found that the vein graft to the obtuse marginal was occluded and RCA had new lesion for which he underwent stenting.  Since then he has undergone stress test twice once in 2016 other one is 2019 and both of them showed some changes in the inferior wall.  He did not have any more anginal symptoms have been managing him medically.  In 2022, more chest pain and SOA noted, stress and echo advised. Stress showed EF 61% with inferolateral infarct and mild michela infarct ischemia, norvasc dosing increased for HTN response. Carotid US 2/22/2024 showed plaque without stenosis.     He comes today for follow up and admits to continued SOA and dizziness which he reports has been the same for several years. He denies CP and syncope. Palpitations noted rarely. [facility], checked 12/2024 and showed: HH 14/44, Na 139, K 5, Creatinine 1.76, GFR 39, TSH 1.49, LDL 66, HDL 47, TRIG 62.             Cardiac History  Past Surgical History:   Procedure Laterality Date    CARDIAC CATHETERIZATION  08/13/2001    100% LCX    CARDIAC CATHETERIZATION   04/24/2002    100% LCX    CARDIAC CATHETERIZATION  12/16/2003    50% LM, 70% LAD, 100% LCX    CARDIAC CATHETERIZATION  06/24/2014    patent LIMA-LAD. 100% SVG-OM, 90% RCA-3.0x26 resolute    CARDIOVASCULAR STRESS TEST  09/20/2007    7 min, 70% THR, negative    CARDIOVASCULAR STRESS TEST  12/01/2008    7 min, 30 sec., 68% THR. R/O lateral ischemia    CARDIOVASCULAR STRESS TEST  04/12/2010    S. Echo- 7min, 30sec, 79% THR. Negative    CARDIOVASCULAR STRESS TEST  04/13/2012    L. Myoview- small apical ischemia    CARDIOVASCULAR STRESS TEST  02/10/2016    L. Myoview- Inferolateral Ischemia. EF > 65%    CARDIOVASCULAR STRESS TEST  03/13/2019    L.Cardiolite- EF 66%. Inferolateral Ischemia.    CARDIOVASCULAR STRESS TEST  07/06/2022    Lexiscan- EF 61%. BP- 178/87. Inferolateral Infarct with Krissy Infarct Ischemia    CORONARY ARTERY BYPASS GRAFT  12/18/2003    MCNEAL-LAD, SVG-OM.    ECHO - CONVERTED  09/20/2007    EF >60%, Mild to mod MR.    ECHO - CONVERTED  12/01/2008    EF>60%, Lateral WMA. RVSP- 42mmHg    ECHO - CONVERTED  04/13/2012    EF 60-65%, aortic root 4.1cm    ECHO - CONVERTED  07/01/2015    EF 65%    ECHO - CONVERTED  02/10/2016    EF 60%    ECHO - CONVERTED  03/13/2019    EF 65%. Mild MR. LA- 3.9 Cm    ECHO - CONVERTED  07/06/2022    TLS. EF 65%. LA- 4.4. Mild MR. RVSP- 24 mmHg    OTHER SURGICAL HISTORY  04/14/2011    CT head- negative    OTHER SURGICAL HISTORY  03/06/2017    BISHNU- no hemodynamically significant stenosis noted either side    US CAROTID UNILATERAL  04/14/2011    no sig stenosis    US CAROTID UNILATERAL  02/22/2024    Plaque bilaterally without stenosis       Current Outpatient Medications   Medication Sig Dispense Refill    amLODIPine (NORVASC) 10 MG tablet Take 1 tablet by mouth Daily. 90 tablet 3    aspirin 81 MG EC tablet Take 1 tablet by mouth Daily. 30 tablet 3    esomeprazole (nexIUM) 40 MG capsule Take 1 capsule by mouth Every Morning Before Breakfast. 90 capsule 1    ezetimibe (ZETIA) 10 MG  tablet Take 1 tablet by mouth Daily. 90 tablet 3    levothyroxine (SYNTHROID, LEVOTHROID) 100 MCG tablet Take 1 tablet by mouth Every Morning.      metoprolol tartrate (LOPRESSOR) 50 MG tablet Take 1 tablet by mouth 2 (Two) Times a Day. 180 tablet 3    nitroglycerin (NITROSTAT) 0.4 MG SL tablet 1 under the tongue as needed for angina, may repeat q5mins for up three doses 25 tablet 1    rosuvastatin (CRESTOR) 10 MG tablet Take 1 tablet by mouth Daily. 90 tablet 3    sildenafil (VIAGRA) 50 MG tablet Take 1 tablet by mouth Daily As Needed for Erectile Dysfunction. 10 tablet 0     No current facility-administered medications for this visit.       Patient has no known allergies.    Past Medical History:   Diagnosis Date    Arthritis     GERD (gastroesophageal reflux disease)     Hypercholesterolemia     Hyperlipidemia     Hypertension     Hypothyroidism     IHD (ischemic heart disease)     PUD (peptic ulcer disease)        Social History     Socioeconomic History    Marital status:    Tobacco Use    Smoking status: Former     Current packs/day: 0.00     Average packs/day: 1 pack/day for 15.0 years (15.0 ttl pk-yrs)     Types: Cigarettes     Start date: 1971     Quit date: 1986     Years since quittin.1    Smokeless tobacco: Never   Vaping Use    Vaping status: Never Used   Substance and Sexual Activity    Alcohol use: No    Drug use: No       Family History   Problem Relation Age of Onset    Heart attack Mother     Heart disease Mother         stent placement     Heart attack Father         multiple    Lung disease Father     Heart attack Brother     Heart disease Brother         CABG at age 42       Review of Systems   Constitutional: Negative for malaise/fatigue and night sweats.   Cardiovascular:  Positive for dyspnea on exertion. Negative for chest pain, claudication, irregular heartbeat, leg swelling, near-syncope, orthopnea, palpitations and syncope.   Respiratory:  Positive for shortness of  "breath. Negative for cough and wheezing.    Musculoskeletal:  Negative for back pain and joint pain.   Gastrointestinal:  Negative for anorexia, heartburn, nausea and vomiting.   Genitourinary:  Negative for dysuria, hematuria, hesitancy and nocturia.   Neurological:  Positive for dizziness and light-headedness.   Psychiatric/Behavioral:  Negative for depression and memory loss. The patient is not nervous/anxious.            Objective     /68 (BP Location: Right arm)   Pulse 61   Ht 177.8 cm (70\")   Wt 94.3 kg (208 lb)   BMI 29.84 kg/m²     Constitutional:       Appearance: Not in distress.   Eyes:      Pupils: Pupils are equal, round, and reactive to light.   HENT:      Nose: Nose normal.   Pulmonary:      Effort: Pulmonary effort is normal.      Breath sounds: Normal breath sounds.   Cardiovascular:      PMI at left midclavicular line. Normal rate. Regular rhythm.      Murmurs: There is a systolic murmur.   Abdominal:      Palpations: Abdomen is soft.   Musculoskeletal: Normal range of motion.      Cervical back: Normal range of motion and neck supple. Skin:     General: Skin is warm and dry.   Neurological:      Mental Status: Alert.           ECG 12 Lead    Date/Time: 3/4/2025 12:43 PM  Performed by: Sylwia Syed APRN    Authorized by: Sylwia Syed APRN  Comparison: compared with previous ECG from 3/30/2021  Similar to previous ECG  Rhythm: sinus rhythm  BPM: 61  Conduction: 1st degree AV block    Clinical impression: abnormal EKG  Comments: Normal QT               Diagnoses and all orders for this visit:    1. IHD (ischemic heart disease) (Primary)  -     ECG 12 Lead    2. Essential hypertension  -     amLODIPine (NORVASC) 10 MG tablet; Take 1 tablet by mouth Daily.  Dispense: 90 tablet; Refill: 3    3. Hypercholesteremia  -     ezetimibe (ZETIA) 10 MG tablet; Take 1 tablet by mouth Daily.  Dispense: 90 tablet; Refill: 3  -     rosuvastatin (CRESTOR) 10 MG tablet; Take 1 tablet by mouth " Daily.  Dispense: 90 tablet; Refill: 3    4. Shortness of breath    5. First degree AV block    6. Dizziness    7. Stage 3b chronic kidney disease    8. Acquired hypothyroidism    9. Overweight    Other orders  -     metoprolol tartrate (LOPRESSOR) 50 MG tablet; Take 1 tablet by mouth 2 (Two) Times a Day.  Dispense: 180 tablet; Refill: 3             IHD: CP is denied. Patient defers further testing as asymptomatic. EKG done today showed NSR with first degree block and no acute ST changes. Continue ASA.     HTN:  Blood pressure stable. Will continue norvasc and lopressor at same. Limited sodium urged. Will monitor and call with concerns.     Hyperlipidemia:  On statin therapy with crestor and zetia therapy without concerns. FLP December showed lipids at goal.      CKD: GFR has dropped, now in the 30s, [facility] monitors, he is unsure if he is seeing renal. Will discuss with you.     Hypothyroid:  On synthroid therapy without concerns, HR at goal.  TSH showed that his TSH was not at goal. He can not recall if his synthroid was adjusted, will discuss with you.     BMI noted at 29.84, good cardiac diet with limited carbs, calories, and activity as tolerated advised.      Refills per request     6 month follow up recommended or sooner if needed        Problems Addressed this Visit          Cardiac and Vasculature    IHD (ischemic heart disease) - Primary    Relevant Medications    amLODIPine (NORVASC) 10 MG tablet    metoprolol tartrate (LOPRESSOR) 50 MG tablet    Other Relevant Orders    ECG 12 Lead    Essential hypertension    Relevant Medications    amLODIPine (NORVASC) 10 MG tablet    metoprolol tartrate (LOPRESSOR) 50 MG tablet    Hypercholesteremia    Relevant Medications    ezetimibe (ZETIA) 10 MG tablet    rosuvastatin (CRESTOR) 10 MG tablet       Endocrine and Metabolic    Hypothyroidism    Relevant Medications    levothyroxine (SYNTHROID, LEVOTHROID) 100 MCG tablet    metoprolol tartrate (LOPRESSOR) 50 MG tablet        Pulmonary and Pneumonias    Shortness of breath     Other Visit Diagnoses       First degree AV block        Relevant Medications    metoprolol tartrate (LOPRESSOR) 50 MG tablet    Dizziness        Stage 3b chronic kidney disease        Overweight              Diagnoses         Codes Comments    IHD (ischemic heart disease)    -  Primary ICD-10-CM: I25.9  ICD-9-CM: 414.9     Essential hypertension     ICD-10-CM: I10  ICD-9-CM: 401.9     Hypercholesteremia     ICD-10-CM: E78.00  ICD-9-CM: 272.0     Shortness of breath     ICD-10-CM: R06.02  ICD-9-CM: 786.05     First degree AV block     ICD-10-CM: I44.0  ICD-9-CM: 426.11     Dizziness     ICD-10-CM: R42  ICD-9-CM: 780.4     Stage 3b chronic kidney disease     ICD-10-CM: N18.32  ICD-9-CM: 585.3     Acquired hypothyroidism     ICD-10-CM: E03.9  ICD-9-CM: 244.9     Overweight     ICD-10-CM: E66.3  ICD-9-CM: 278.02                        Electronically signed by Sylwia Syed, APRN March 4, 2025 13:01 EST

## 2025-04-08 DIAGNOSIS — N52.9 ERECTILE DYSFUNCTION, UNSPECIFIED ERECTILE DYSFUNCTION TYPE: ICD-10-CM

## 2025-04-14 RX ORDER — SILDENAFIL 50 MG/1
50 TABLET, FILM COATED ORAL DAILY PRN
Qty: 10 TABLET | Refills: 0 | Status: SHIPPED | OUTPATIENT
Start: 2025-04-14

## 2025-04-23 DIAGNOSIS — N52.9 ERECTILE DYSFUNCTION, UNSPECIFIED ERECTILE DYSFUNCTION TYPE: ICD-10-CM

## 2025-04-23 RX ORDER — SILDENAFIL 50 MG/1
50 TABLET, FILM COATED ORAL DAILY PRN
Qty: 10 TABLET | Refills: 0 | OUTPATIENT
Start: 2025-04-23

## 2025-06-12 NOTE — TELEPHONE ENCOUNTER
He can not take his imdur and cialis.  These are contraindicated.  It can cause an unsafe drop in his blood pressure.  Most recent stress test was positive.   Airway patent, Nasal mucosa clear. Mouth with normal mucosa. Throat has no vesicles, no oropharyngeal exudates and uvula is midline.